# Patient Record
Sex: MALE | Race: WHITE | Employment: FULL TIME | URBAN - METROPOLITAN AREA
[De-identification: names, ages, dates, MRNs, and addresses within clinical notes are randomized per-mention and may not be internally consistent; named-entity substitution may affect disease eponyms.]

---

## 2017-01-27 ENCOUNTER — TELEPHONE (OUTPATIENT)
Dept: FAMILY MEDICINE CLINIC | Age: 52
End: 2017-01-27

## 2017-01-27 DIAGNOSIS — R10.9 FLANK PAIN: ICD-10-CM

## 2017-01-27 DIAGNOSIS — M15.9 PRIMARY OSTEOARTHRITIS INVOLVING MULTIPLE JOINTS: ICD-10-CM

## 2017-01-27 DIAGNOSIS — S39.012A STRAIN OF LUMBAR PARASPINAL MUSCLE, INITIAL ENCOUNTER: ICD-10-CM

## 2017-01-27 RX ORDER — HYDROCODONE BITARTRATE AND IBUPROFEN 7.5; 2 MG/1; MG/1
1 TABLET, FILM COATED ORAL 3 TIMES DAILY PRN
Qty: 90 TABLET | Refills: 0 | Status: SHIPPED | OUTPATIENT
Start: 2017-01-27 | End: 2017-03-08 | Stop reason: SDUPTHER

## 2017-02-23 ENCOUNTER — OFFICE VISIT (OUTPATIENT)
Dept: FAMILY MEDICINE CLINIC | Age: 52
End: 2017-02-23

## 2017-02-23 VITALS
DIASTOLIC BLOOD PRESSURE: 60 MMHG | BODY MASS INDEX: 27.49 KG/M2 | HEART RATE: 104 BPM | TEMPERATURE: 98.3 F | HEIGHT: 75 IN | RESPIRATION RATE: 16 BRPM | SYSTOLIC BLOOD PRESSURE: 108 MMHG | WEIGHT: 221.1 LBS

## 2017-02-23 DIAGNOSIS — I10 ESSENTIAL HYPERTENSION: ICD-10-CM

## 2017-02-23 DIAGNOSIS — J06.9 VIRAL UPPER RESPIRATORY TRACT INFECTION: Primary | ICD-10-CM

## 2017-02-23 DIAGNOSIS — F41.9 ANXIETY: ICD-10-CM

## 2017-02-23 PROCEDURE — 99214 OFFICE O/P EST MOD 30 MIN: CPT | Performed by: FAMILY MEDICINE

## 2017-02-23 RX ORDER — OSELTAMIVIR PHOSPHATE 75 MG/1
75 CAPSULE ORAL 2 TIMES DAILY
Qty: 10 CAPSULE | Refills: 0 | Status: SHIPPED | OUTPATIENT
Start: 2017-02-23 | End: 2017-02-28

## 2017-02-23 RX ORDER — ALPRAZOLAM 1 MG/1
1 TABLET ORAL 3 TIMES DAILY PRN
Qty: 270 TABLET | Refills: 0 | Status: SHIPPED | OUTPATIENT
Start: 2017-02-23 | End: 2017-07-12 | Stop reason: SDUPTHER

## 2017-02-23 ASSESSMENT — ENCOUNTER SYMPTOMS
GASTROINTESTINAL NEGATIVE: 1
WHEEZING: 0
SORE THROAT: 1
RHINORRHEA: 1
COUGH: 1
SINUS PRESSURE: 1
CHEST TIGHTNESS: 0
SHORTNESS OF BREATH: 0

## 2017-03-08 DIAGNOSIS — M15.9 PRIMARY OSTEOARTHRITIS INVOLVING MULTIPLE JOINTS: ICD-10-CM

## 2017-03-08 DIAGNOSIS — S39.012A STRAIN OF LUMBAR PARASPINAL MUSCLE, INITIAL ENCOUNTER: ICD-10-CM

## 2017-03-08 DIAGNOSIS — R10.9 FLANK PAIN: ICD-10-CM

## 2017-03-08 RX ORDER — HYDROCODONE BITARTRATE AND IBUPROFEN 7.5; 2 MG/1; MG/1
1 TABLET, FILM COATED ORAL 3 TIMES DAILY PRN
Qty: 90 TABLET | Refills: 0 | Status: SHIPPED | OUTPATIENT
Start: 2017-03-08 | End: 2017-04-13 | Stop reason: SDUPTHER

## 2017-04-13 DIAGNOSIS — R10.9 FLANK PAIN: ICD-10-CM

## 2017-04-13 DIAGNOSIS — M15.9 PRIMARY OSTEOARTHRITIS INVOLVING MULTIPLE JOINTS: ICD-10-CM

## 2017-04-13 DIAGNOSIS — S39.012A STRAIN OF LUMBAR PARASPINAL MUSCLE, INITIAL ENCOUNTER: ICD-10-CM

## 2017-04-13 RX ORDER — HYDROCODONE BITARTRATE AND IBUPROFEN 7.5; 2 MG/1; MG/1
1 TABLET, FILM COATED ORAL 3 TIMES DAILY PRN
Qty: 90 TABLET | Refills: 0 | Status: SHIPPED | OUTPATIENT
Start: 2017-04-13 | End: 2017-05-16 | Stop reason: SDUPTHER

## 2017-05-03 DIAGNOSIS — M54.2 CHRONIC CERVICAL PAIN: ICD-10-CM

## 2017-05-03 DIAGNOSIS — M54.9 SPINE PAIN: ICD-10-CM

## 2017-05-03 DIAGNOSIS — G89.29 CHRONIC CERVICAL PAIN: ICD-10-CM

## 2017-05-03 DIAGNOSIS — M15.9 PRIMARY OSTEOARTHRITIS INVOLVING MULTIPLE JOINTS: ICD-10-CM

## 2017-05-03 RX ORDER — METAXALONE 800 MG/1
TABLET ORAL
Qty: 270 TABLET | Refills: 2 | Status: ON HOLD | OUTPATIENT
Start: 2017-05-03 | End: 2018-10-22 | Stop reason: DRUGHIGH

## 2017-05-16 ENCOUNTER — TELEPHONE (OUTPATIENT)
Dept: FAMILY MEDICINE CLINIC | Age: 52
End: 2017-05-16

## 2017-05-16 DIAGNOSIS — M15.9 PRIMARY OSTEOARTHRITIS INVOLVING MULTIPLE JOINTS: ICD-10-CM

## 2017-05-16 DIAGNOSIS — R10.9 FLANK PAIN: ICD-10-CM

## 2017-05-16 DIAGNOSIS — S39.012A STRAIN OF LUMBAR PARASPINAL MUSCLE, INITIAL ENCOUNTER: ICD-10-CM

## 2017-05-16 RX ORDER — HYDROCODONE BITARTRATE AND IBUPROFEN 7.5; 2 MG/1; MG/1
1 TABLET, FILM COATED ORAL 3 TIMES DAILY PRN
Qty: 90 TABLET | Refills: 0 | Status: SHIPPED | OUTPATIENT
Start: 2017-05-16 | End: 2017-06-14 | Stop reason: SDUPTHER

## 2017-06-14 ENCOUNTER — TELEPHONE (OUTPATIENT)
Dept: FAMILY MEDICINE CLINIC | Age: 52
End: 2017-06-14

## 2017-06-14 DIAGNOSIS — S39.012A STRAIN OF LUMBAR PARASPINAL MUSCLE, INITIAL ENCOUNTER: ICD-10-CM

## 2017-06-14 DIAGNOSIS — R10.9 FLANK PAIN: ICD-10-CM

## 2017-06-14 DIAGNOSIS — M15.9 PRIMARY OSTEOARTHRITIS INVOLVING MULTIPLE JOINTS: ICD-10-CM

## 2017-06-14 RX ORDER — HYDROCODONE BITARTRATE AND IBUPROFEN 7.5; 2 MG/1; MG/1
1 TABLET, FILM COATED ORAL 3 TIMES DAILY PRN
Qty: 90 TABLET | Refills: 0 | Status: SHIPPED | OUTPATIENT
Start: 2017-06-14 | End: 2017-06-15 | Stop reason: SDUPTHER

## 2017-06-15 ENCOUNTER — TELEPHONE (OUTPATIENT)
Dept: FAMILY MEDICINE CLINIC | Age: 52
End: 2017-06-15

## 2017-06-15 DIAGNOSIS — M15.9 PRIMARY OSTEOARTHRITIS INVOLVING MULTIPLE JOINTS: ICD-10-CM

## 2017-06-15 DIAGNOSIS — R10.9 FLANK PAIN: ICD-10-CM

## 2017-06-15 DIAGNOSIS — S39.012A STRAIN OF LUMBAR PARASPINAL MUSCLE, INITIAL ENCOUNTER: ICD-10-CM

## 2017-06-15 RX ORDER — HYDROCODONE BITARTRATE AND IBUPROFEN 7.5; 2 MG/1; MG/1
1 TABLET, FILM COATED ORAL 3 TIMES DAILY PRN
Qty: 90 TABLET | Refills: 0 | Status: SHIPPED | OUTPATIENT
Start: 2017-06-15 | End: 2017-07-17 | Stop reason: SDUPTHER

## 2017-07-12 ENCOUNTER — TELEPHONE (OUTPATIENT)
Dept: FAMILY MEDICINE CLINIC | Age: 52
End: 2017-07-12

## 2017-07-12 DIAGNOSIS — F41.9 ANXIETY: ICD-10-CM

## 2017-07-12 RX ORDER — ALPRAZOLAM 1 MG/1
1 TABLET ORAL 3 TIMES DAILY PRN
Qty: 270 TABLET | Refills: 0 | Status: SHIPPED | OUTPATIENT
Start: 2017-07-12 | End: 2017-09-20 | Stop reason: SDUPTHER

## 2017-07-17 DIAGNOSIS — M15.9 PRIMARY OSTEOARTHRITIS INVOLVING MULTIPLE JOINTS: ICD-10-CM

## 2017-07-17 DIAGNOSIS — S39.012A STRAIN OF LUMBAR PARASPINAL MUSCLE, INITIAL ENCOUNTER: ICD-10-CM

## 2017-07-17 DIAGNOSIS — R10.9 FLANK PAIN: ICD-10-CM

## 2017-07-17 RX ORDER — HYDROCODONE BITARTRATE AND IBUPROFEN 7.5; 2 MG/1; MG/1
1 TABLET, FILM COATED ORAL 3 TIMES DAILY PRN
Qty: 90 TABLET | Refills: 0 | Status: SHIPPED | OUTPATIENT
Start: 2017-07-17 | End: 2017-08-16 | Stop reason: SDUPTHER

## 2017-08-16 DIAGNOSIS — M15.9 PRIMARY OSTEOARTHRITIS INVOLVING MULTIPLE JOINTS: ICD-10-CM

## 2017-08-16 DIAGNOSIS — S39.012A STRAIN OF LUMBAR PARASPINAL MUSCLE, INITIAL ENCOUNTER: ICD-10-CM

## 2017-08-16 DIAGNOSIS — R10.9 FLANK PAIN: ICD-10-CM

## 2017-08-16 RX ORDER — HYDROCODONE BITARTRATE AND IBUPROFEN 7.5; 2 MG/1; MG/1
1 TABLET, FILM COATED ORAL 3 TIMES DAILY PRN
Qty: 90 TABLET | Refills: 0 | Status: SHIPPED | OUTPATIENT
Start: 2017-08-16 | End: 2017-09-14 | Stop reason: SDUPTHER

## 2017-08-16 RX ORDER — HYDROCODONE BITARTRATE AND IBUPROFEN 7.5; 2 MG/1; MG/1
1 TABLET, FILM COATED ORAL 3 TIMES DAILY PRN
Qty: 90 TABLET | Refills: 0 | Status: SHIPPED | OUTPATIENT
Start: 2017-08-16 | End: 2017-08-16 | Stop reason: SDUPTHER

## 2017-08-21 ENCOUNTER — OFFICE VISIT (OUTPATIENT)
Dept: FAMILY MEDICINE CLINIC | Age: 52
End: 2017-08-21
Payer: COMMERCIAL

## 2017-08-21 VITALS
HEIGHT: 76 IN | BODY MASS INDEX: 26.81 KG/M2 | DIASTOLIC BLOOD PRESSURE: 76 MMHG | SYSTOLIC BLOOD PRESSURE: 118 MMHG | RESPIRATION RATE: 20 BRPM | WEIGHT: 220.2 LBS | HEART RATE: 96 BPM

## 2017-08-21 DIAGNOSIS — Z51.81 MEDICATION MONITORING ENCOUNTER: ICD-10-CM

## 2017-08-21 DIAGNOSIS — F41.9 CHRONIC ANXIETY: ICD-10-CM

## 2017-08-21 DIAGNOSIS — Z11.4 SCREENING FOR HIV WITHOUT PRESENCE OF RISK FACTORS: ICD-10-CM

## 2017-08-21 DIAGNOSIS — Z98.890 HX OF LUMBAR DISCECTOMY: ICD-10-CM

## 2017-08-21 DIAGNOSIS — E78.00 HYPERCHOLESTEREMIA: ICD-10-CM

## 2017-08-21 DIAGNOSIS — M17.11 PRIMARY OSTEOARTHRITIS OF RIGHT KNEE: ICD-10-CM

## 2017-08-21 DIAGNOSIS — Z11.59 NEED FOR HEPATITIS C SCREENING TEST: ICD-10-CM

## 2017-08-21 DIAGNOSIS — K21.9 GASTROESOPHAGEAL REFLUX DISEASE WITHOUT ESOPHAGITIS: ICD-10-CM

## 2017-08-21 DIAGNOSIS — I10 ESSENTIAL HYPERTENSION: Primary | ICD-10-CM

## 2017-08-21 DIAGNOSIS — M54.9 SPINE PAIN: ICD-10-CM

## 2017-08-21 PROCEDURE — 99214 OFFICE O/P EST MOD 30 MIN: CPT | Performed by: FAMILY MEDICINE

## 2017-08-21 ASSESSMENT — ENCOUNTER SYMPTOMS
COUGH: 0
SHORTNESS OF BREATH: 0
ALLERGIC/IMMUNOLOGIC NEGATIVE: 1
BACK PAIN: 1
RESPIRATORY NEGATIVE: 1
GASTROINTESTINAL NEGATIVE: 1

## 2017-08-21 ASSESSMENT — PATIENT HEALTH QUESTIONNAIRE - PHQ9
SUM OF ALL RESPONSES TO PHQ9 QUESTIONS 1 & 2: 0
2. FEELING DOWN, DEPRESSED OR HOPELESS: 0
SUM OF ALL RESPONSES TO PHQ QUESTIONS 1-9: 0
1. LITTLE INTEREST OR PLEASURE IN DOING THINGS: 0

## 2017-09-14 DIAGNOSIS — M15.9 PRIMARY OSTEOARTHRITIS INVOLVING MULTIPLE JOINTS: ICD-10-CM

## 2017-09-14 DIAGNOSIS — S39.012A STRAIN OF LUMBAR PARASPINAL MUSCLE, INITIAL ENCOUNTER: ICD-10-CM

## 2017-09-14 DIAGNOSIS — R10.9 FLANK PAIN: ICD-10-CM

## 2017-09-14 RX ORDER — HYDROCODONE BITARTRATE AND IBUPROFEN 7.5; 2 MG/1; MG/1
1 TABLET, FILM COATED ORAL 3 TIMES DAILY PRN
Qty: 90 TABLET | Refills: 0 | Status: SHIPPED | OUTPATIENT
Start: 2017-09-14 | End: 2017-10-12 | Stop reason: SDUPTHER

## 2017-09-20 DIAGNOSIS — F41.9 ANXIETY: ICD-10-CM

## 2017-09-20 RX ORDER — ALPRAZOLAM 1 MG/1
TABLET ORAL
Qty: 270 TABLET | Refills: 1 | Status: SHIPPED | OUTPATIENT
Start: 2017-09-20 | End: 2019-04-08 | Stop reason: SDUPTHER

## 2017-10-12 DIAGNOSIS — S39.012A STRAIN OF LUMBAR PARASPINAL MUSCLE, INITIAL ENCOUNTER: ICD-10-CM

## 2017-10-12 DIAGNOSIS — R10.9 FLANK PAIN: ICD-10-CM

## 2017-10-12 DIAGNOSIS — M15.9 PRIMARY OSTEOARTHRITIS INVOLVING MULTIPLE JOINTS: ICD-10-CM

## 2017-10-12 RX ORDER — HYDROCODONE BITARTRATE AND IBUPROFEN 7.5; 2 MG/1; MG/1
1 TABLET, FILM COATED ORAL 3 TIMES DAILY PRN
Qty: 90 TABLET | Refills: 0 | Status: SHIPPED | OUTPATIENT
Start: 2017-10-12 | End: 2017-11-13 | Stop reason: SDUPTHER

## 2017-10-12 NOTE — TELEPHONE ENCOUNTER
From: Divina Tanner. Sent: 10/12/2017 3:17 PM EDT  Subject: Medication Renewal Request    Mendel Berumen.  Tyrone Barber. would like a refill of the following medications:  HYDROcodone-ibuprofen (Roena Yolanda) 7.5-200 MG per tablet Houston Rivera MD]    Preferred pharmacy: [facility] - [place], [address] [facility] [phone]    Comment:

## 2017-11-13 DIAGNOSIS — R10.9 FLANK PAIN: ICD-10-CM

## 2017-11-13 DIAGNOSIS — M15.9 PRIMARY OSTEOARTHRITIS INVOLVING MULTIPLE JOINTS: ICD-10-CM

## 2017-11-13 DIAGNOSIS — S39.012A STRAIN OF LUMBAR PARASPINAL MUSCLE, INITIAL ENCOUNTER: ICD-10-CM

## 2017-11-13 RX ORDER — HYDROCODONE BITARTRATE AND IBUPROFEN 7.5; 2 MG/1; MG/1
1 TABLET, FILM COATED ORAL 3 TIMES DAILY PRN
Qty: 90 TABLET | Refills: 0 | Status: SHIPPED | OUTPATIENT
Start: 2017-11-13 | End: 2017-12-11 | Stop reason: SDUPTHER

## 2017-11-13 NOTE — TELEPHONE ENCOUNTER
From: Sujatha Willoughby. Sent: 11/13/2017 1:37 PM EST  Subject: Medication Renewal Request    Yahir Graves.  Roel Abernathy. would like a refill of the following medications:  HYDROcodone-ibuprofen (Inell Garter) 7.5-200 MG per tablet Mustapha Baron MD]    Preferred pharmacy: [facility] - [place], [address] [facility] [phone]    Comment:

## 2017-12-11 DIAGNOSIS — M15.9 PRIMARY OSTEOARTHRITIS INVOLVING MULTIPLE JOINTS: ICD-10-CM

## 2017-12-11 DIAGNOSIS — S39.012A STRAIN OF LUMBAR PARASPINAL MUSCLE, INITIAL ENCOUNTER: ICD-10-CM

## 2017-12-11 DIAGNOSIS — R10.9 FLANK PAIN: ICD-10-CM

## 2017-12-11 RX ORDER — HYDROCODONE BITARTRATE AND IBUPROFEN 7.5; 2 MG/1; MG/1
1 TABLET, FILM COATED ORAL 3 TIMES DAILY PRN
Qty: 90 TABLET | Refills: 0 | Status: SHIPPED | OUTPATIENT
Start: 2017-12-11 | End: 2018-01-11 | Stop reason: SDUPTHER

## 2017-12-27 ENCOUNTER — PATIENT MESSAGE (OUTPATIENT)
Dept: FAMILY MEDICINE CLINIC | Age: 52
End: 2017-12-27

## 2017-12-28 NOTE — TELEPHONE ENCOUNTER
From: Chantel Gupta. To: Yoel Morrow MD  Sent: 12/27/2017 4:36 PM EST  Subject: Prescription Question    I do not see the medicine I need in the list above. The bottle I have says hydroxtzine HCI 25 mg. tablet 3 X daily as needed for itching.

## 2018-01-11 DIAGNOSIS — S39.012A STRAIN OF LUMBAR PARASPINAL MUSCLE, INITIAL ENCOUNTER: ICD-10-CM

## 2018-01-11 DIAGNOSIS — M15.9 PRIMARY OSTEOARTHRITIS INVOLVING MULTIPLE JOINTS: ICD-10-CM

## 2018-01-11 DIAGNOSIS — R10.9 FLANK PAIN: ICD-10-CM

## 2018-01-11 RX ORDER — HYDROCODONE BITARTRATE AND IBUPROFEN 7.5; 2 MG/1; MG/1
1 TABLET, FILM COATED ORAL 3 TIMES DAILY PRN
Qty: 90 TABLET | Refills: 0 | Status: SHIPPED | OUTPATIENT
Start: 2018-01-11 | End: 2018-01-12 | Stop reason: SDUPTHER

## 2018-01-11 NOTE — TELEPHONE ENCOUNTER
From: Guille Yun. Sent: 1/11/2018 1:45 PM EST  Subject: Medication Renewal Request    Melanie Norman.  Jack Rowan. would like a refill of the following medications:  HYDROcodone-ibuprofen (Luigi Sinks) 7.5-200 MG per tablet David Daniels MD]    Preferred pharmacy: [facility] - [place], [address] [facility] [phone]    Comment:

## 2018-01-12 ENCOUNTER — TELEPHONE (OUTPATIENT)
Dept: FAMILY MEDICINE CLINIC | Age: 53
End: 2018-01-12

## 2018-01-12 DIAGNOSIS — M15.9 PRIMARY OSTEOARTHRITIS INVOLVING MULTIPLE JOINTS: ICD-10-CM

## 2018-01-12 DIAGNOSIS — S39.012A STRAIN OF LUMBAR PARASPINAL MUSCLE, INITIAL ENCOUNTER: ICD-10-CM

## 2018-01-12 DIAGNOSIS — R10.9 FLANK PAIN: ICD-10-CM

## 2018-01-12 RX ORDER — HYDROCODONE BITARTRATE AND IBUPROFEN 7.5; 2 MG/1; MG/1
1 TABLET, FILM COATED ORAL 3 TIMES DAILY PRN
Qty: 90 TABLET | Refills: 0 | Status: SHIPPED | OUTPATIENT
Start: 2018-01-12 | End: 2018-02-19 | Stop reason: SDUPTHER

## 2018-01-15 NOTE — TELEPHONE ENCOUNTER
1/15/18 Noelle is asking the status on this PA, pls call her on her cell [phone], thanks/angelica

## 2018-01-15 NOTE — TELEPHONE ENCOUNTER
He can try and contact his orthopedist and see if they can push through a prior authorization.   If not, he can check into what self-pay will cost.

## 2018-02-19 ENCOUNTER — OFFICE VISIT (OUTPATIENT)
Dept: FAMILY MEDICINE CLINIC | Age: 53
End: 2018-02-19
Payer: COMMERCIAL

## 2018-02-19 VITALS
BODY MASS INDEX: 26.99 KG/M2 | WEIGHT: 221.6 LBS | HEIGHT: 76 IN | HEART RATE: 96 BPM | SYSTOLIC BLOOD PRESSURE: 120 MMHG | DIASTOLIC BLOOD PRESSURE: 78 MMHG | TEMPERATURE: 98.4 F | RESPIRATION RATE: 12 BRPM

## 2018-02-19 DIAGNOSIS — M15.9 PRIMARY OSTEOARTHRITIS INVOLVING MULTIPLE JOINTS: ICD-10-CM

## 2018-02-19 DIAGNOSIS — M51.16 LUMBAR DISC DISEASE WITH RADICULOPATHY: ICD-10-CM

## 2018-02-19 DIAGNOSIS — I10 ESSENTIAL HYPERTENSION: Primary | ICD-10-CM

## 2018-02-19 DIAGNOSIS — S39.012A STRAIN OF LUMBAR PARASPINAL MUSCLE, INITIAL ENCOUNTER: ICD-10-CM

## 2018-02-19 DIAGNOSIS — E78.00 HYPERCHOLESTEREMIA: ICD-10-CM

## 2018-02-19 DIAGNOSIS — Z98.890 HX OF LUMBAR DISCECTOMY: ICD-10-CM

## 2018-02-19 DIAGNOSIS — R10.9 FLANK PAIN: ICD-10-CM

## 2018-02-19 DIAGNOSIS — K21.9 GASTROESOPHAGEAL REFLUX DISEASE WITHOUT ESOPHAGITIS: ICD-10-CM

## 2018-02-19 DIAGNOSIS — F41.9 CHRONIC ANXIETY: ICD-10-CM

## 2018-02-19 DIAGNOSIS — M17.11 PRIMARY OSTEOARTHRITIS OF RIGHT KNEE: ICD-10-CM

## 2018-02-19 PROCEDURE — 99214 OFFICE O/P EST MOD 30 MIN: CPT | Performed by: FAMILY MEDICINE

## 2018-02-19 RX ORDER — HYDROCODONE BITARTRATE AND IBUPROFEN 7.5; 2 MG/1; MG/1
1 TABLET, FILM COATED ORAL 3 TIMES DAILY PRN
Qty: 90 TABLET | Refills: 0 | Status: SHIPPED | OUTPATIENT
Start: 2018-02-19 | End: 2018-03-20 | Stop reason: SDUPTHER

## 2018-02-19 ASSESSMENT — ENCOUNTER SYMPTOMS
ALLERGIC/IMMUNOLOGIC NEGATIVE: 1
COUGH: 0
RESPIRATORY NEGATIVE: 1
GASTROINTESTINAL NEGATIVE: 1
BACK PAIN: 1

## 2018-02-19 NOTE — PROGRESS NOTES
will be reassessed this May, 2018, when both he and his wife will participate with the [employer] [facility] lab draw at the [facility]. Cholesterol, Total (no units)   Date Value   05/03/2014 177     Cholesterol (mg/dL)   Date Value   05/06/2017 153     HDL   Date Value   05/06/2017 41 mg/dL   05/05/2012 40 mg/dl     Triglycerides (mg/dL)   Date Value   05/06/2017 189 (H)     The rest of this patient's conditions are stable. Past medical and surgical hx reviewed. History reviewed. No pertinent past medical history. Past Surgical History:   Procedure Laterality Date    BACK SURGERY  11/18/2016    L-5 and bulging disc repair 2 rods and 4 screws with fusion at [facility] with Dr. Bryan Bruce     Portions of this note were completed with a voice recording program.  Efforts were made to edit the dictations but occasionally words are mis-transcribed. Review of Systems   Constitutional: Negative. HENT: Negative. Respiratory: Negative. Negative for cough. Cardiovascular: Negative. Negative for chest pain, palpitations and leg swelling. Gastrointestinal: Negative. Endocrine: Negative. Genitourinary: Negative. Negative for difficulty urinating. Musculoskeletal: Positive for arthralgias and back pain. Skin: Negative. Allergic/Immunologic: Negative. Neurological: Negative. Hematological: Negative. Psychiatric/Behavioral: The patient is nervous/anxious. All other systems reviewed and are negative. Objective:   Physical Exam   Constitutional: He is oriented to person, place, and time. He appears well-developed and well-nourished. HENT:   Right Ear: External ear normal.   Left Ear: External ear normal.   Nose: Nose normal.   Mouth/Throat: Oropharynx is clear and moist.   Eyes: Conjunctivae are normal.   Neck: No thyromegaly present. Cardiovascular: Normal rate, regular rhythm and normal heart sounds. Exam reveals no gallop.     No murmur

## 2018-03-09 DIAGNOSIS — E78.00 HYPERCHOLESTEREMIA: ICD-10-CM

## 2018-03-09 DIAGNOSIS — I10 ESSENTIAL HYPERTENSION: ICD-10-CM

## 2018-03-12 RX ORDER — ATORVASTATIN CALCIUM 40 MG/1
TABLET, FILM COATED ORAL
Qty: 90 TABLET | Refills: 3 | Status: SHIPPED | OUTPATIENT
Start: 2018-03-12 | End: 2018-04-23 | Stop reason: SDUPTHER

## 2018-03-12 RX ORDER — LISINOPRIL 20 MG/1
TABLET ORAL
Qty: 90 TABLET | Refills: 3 | Status: SHIPPED | OUTPATIENT
Start: 2018-03-12 | End: 2018-04-23 | Stop reason: SDUPTHER

## 2018-03-20 DIAGNOSIS — M15.9 PRIMARY OSTEOARTHRITIS INVOLVING MULTIPLE JOINTS: ICD-10-CM

## 2018-03-20 DIAGNOSIS — R10.9 FLANK PAIN: ICD-10-CM

## 2018-03-20 DIAGNOSIS — S39.012A STRAIN OF LUMBAR PARASPINAL MUSCLE, INITIAL ENCOUNTER: ICD-10-CM

## 2018-03-20 RX ORDER — HYDROCODONE BITARTRATE AND IBUPROFEN 7.5; 2 MG/1; MG/1
1 TABLET, FILM COATED ORAL 3 TIMES DAILY PRN
Qty: 90 TABLET | Refills: 0 | Status: SHIPPED | OUTPATIENT
Start: 2018-03-20 | End: 2018-04-20 | Stop reason: SDUPTHER

## 2018-04-20 DIAGNOSIS — S39.012A STRAIN OF LUMBAR PARASPINAL MUSCLE, INITIAL ENCOUNTER: ICD-10-CM

## 2018-04-20 DIAGNOSIS — M15.9 PRIMARY OSTEOARTHRITIS INVOLVING MULTIPLE JOINTS: ICD-10-CM

## 2018-04-20 DIAGNOSIS — R10.9 FLANK PAIN: ICD-10-CM

## 2018-04-20 RX ORDER — HYDROCODONE BITARTRATE AND IBUPROFEN 7.5; 2 MG/1; MG/1
1 TABLET, FILM COATED ORAL 3 TIMES DAILY PRN
Qty: 90 TABLET | Refills: 0 | Status: SHIPPED | OUTPATIENT
Start: 2018-04-20 | End: 2018-05-21 | Stop reason: SDUPTHER

## 2018-04-23 DIAGNOSIS — I10 ESSENTIAL HYPERTENSION: ICD-10-CM

## 2018-04-23 DIAGNOSIS — E78.00 HYPERCHOLESTEREMIA: ICD-10-CM

## 2018-04-23 RX ORDER — LISINOPRIL 20 MG/1
20 TABLET ORAL DAILY
Qty: 90 TABLET | Refills: 3 | Status: SHIPPED | OUTPATIENT
Start: 2018-04-23 | End: 2019-04-17 | Stop reason: SDUPTHER

## 2018-04-23 RX ORDER — ATORVASTATIN CALCIUM 40 MG/1
40 TABLET, FILM COATED ORAL NIGHTLY
Qty: 90 TABLET | Refills: 3 | Status: SHIPPED | OUTPATIENT
Start: 2018-04-23 | End: 2019-04-17 | Stop reason: SDUPTHER

## 2018-06-19 DIAGNOSIS — R10.9 FLANK PAIN: ICD-10-CM

## 2018-06-19 DIAGNOSIS — M15.9 PRIMARY OSTEOARTHRITIS INVOLVING MULTIPLE JOINTS: ICD-10-CM

## 2018-06-19 DIAGNOSIS — S39.012A STRAIN OF LUMBAR PARASPINAL MUSCLE, INITIAL ENCOUNTER: ICD-10-CM

## 2018-06-19 RX ORDER — HYDROCODONE BITARTRATE AND IBUPROFEN 7.5; 2 MG/1; MG/1
1 TABLET, FILM COATED ORAL 3 TIMES DAILY PRN
Qty: 90 TABLET | Refills: 0 | Status: SHIPPED | OUTPATIENT
Start: 2018-06-19 | End: 2018-06-19 | Stop reason: SDUPTHER

## 2018-06-19 RX ORDER — HYDROCODONE BITARTRATE AND IBUPROFEN 7.5; 2 MG/1; MG/1
1 TABLET, FILM COATED ORAL 3 TIMES DAILY PRN
Qty: 90 TABLET | Refills: 0 | Status: SHIPPED | OUTPATIENT
Start: 2018-06-19 | End: 2018-07-24 | Stop reason: SDUPTHER

## 2018-08-20 ENCOUNTER — OFFICE VISIT (OUTPATIENT)
Dept: FAMILY MEDICINE CLINIC | Age: 53
End: 2018-08-20
Payer: COMMERCIAL

## 2018-08-20 VITALS
BODY MASS INDEX: 27.16 KG/M2 | RESPIRATION RATE: 16 BRPM | SYSTOLIC BLOOD PRESSURE: 118 MMHG | DIASTOLIC BLOOD PRESSURE: 72 MMHG | HEART RATE: 84 BPM | HEIGHT: 76 IN | WEIGHT: 223 LBS

## 2018-08-20 DIAGNOSIS — K21.9 GASTROESOPHAGEAL REFLUX DISEASE WITHOUT ESOPHAGITIS: ICD-10-CM

## 2018-08-20 DIAGNOSIS — S39.012A STRAIN OF LUMBAR PARASPINAL MUSCLE, INITIAL ENCOUNTER: ICD-10-CM

## 2018-08-20 DIAGNOSIS — I10 ESSENTIAL HYPERTENSION: ICD-10-CM

## 2018-08-20 DIAGNOSIS — M17.11 PRIMARY OSTEOARTHRITIS OF RIGHT KNEE: Primary | ICD-10-CM

## 2018-08-20 DIAGNOSIS — M51.16 LUMBAR DISC DISEASE WITH RADICULOPATHY: ICD-10-CM

## 2018-08-20 DIAGNOSIS — M25.551 PAIN OF RIGHT HIP JOINT: ICD-10-CM

## 2018-08-20 DIAGNOSIS — R10.9 FLANK PAIN: ICD-10-CM

## 2018-08-20 DIAGNOSIS — M15.9 PRIMARY OSTEOARTHRITIS INVOLVING MULTIPLE JOINTS: ICD-10-CM

## 2018-08-20 DIAGNOSIS — E78.00 HYPERCHOLESTEREMIA: ICD-10-CM

## 2018-08-20 DIAGNOSIS — Z51.81 MEDICATION MONITORING ENCOUNTER: ICD-10-CM

## 2018-08-20 PROCEDURE — 99214 OFFICE O/P EST MOD 30 MIN: CPT | Performed by: FAMILY MEDICINE

## 2018-08-20 RX ORDER — HYDROCODONE BITARTRATE AND IBUPROFEN 7.5; 2 MG/1; MG/1
1 TABLET, FILM COATED ORAL 3 TIMES DAILY PRN
Qty: 90 TABLET | Refills: 0 | Status: SHIPPED | OUTPATIENT
Start: 2018-08-20 | End: 2018-09-21 | Stop reason: SDUPTHER

## 2018-08-20 ASSESSMENT — ENCOUNTER SYMPTOMS
RESPIRATORY NEGATIVE: 1
ALLERGIC/IMMUNOLOGIC NEGATIVE: 1
GASTROINTESTINAL NEGATIVE: 1
BACK PAIN: 1

## 2018-08-20 NOTE — PATIENT INSTRUCTIONS
You may receive a survey about your visit with us today. The feedback from our patients helps us identify what is working well and where the service to all patients can be enhanced. Thank you! Patient Education        Continue present medications. Walk as tolerated for exercise. High Blood Pressure: Care Instructions  Your Care Instructions    If your blood pressure is usually above 130/80, you have high blood pressure, or hypertension. That means the top number is 130 or higher or the bottom number is 80 or higher, or both. Despite what a lot of people think, high blood pressure usually doesn't cause headaches or make you feel dizzy or lightheaded. It usually has no symptoms. But it does increase your risk for heart attack, stroke, and kidney or eye damage. The higher your blood pressure, the more your risk increases. Your doctor will give you a goal for your blood pressure. Your goal will be based on your health and your age. Lifestyle changes, such as eating healthy and being active, are always important to help lower blood pressure. You might also take medicine to reach your blood pressure goal.  Follow-up care is a key part of your treatment and safety. Be sure to make and go to all appointments, and call your doctor if you are having problems. It's also a good idea to know your test results and keep a list of the medicines you take. How can you care for yourself at home? Medical treatment  · If you stop taking your medicine, your blood pressure will go back up. You may take one or more types of medicine to lower your blood pressure. Be safe with medicines. Take your medicine exactly as prescribed. Call your doctor if you think you are having a problem with your medicine. · Talk to your doctor before you start taking aspirin every day. Aspirin can help certain people lower their risk of a heart attack or stroke.  But taking aspirin isn't right for everyone, because it can cause serious

## 2018-08-20 NOTE — PROGRESS NOTES
for right knee replacement. At this point he is interested in pursuing anything in the right hip. His gastroesophageal reflux and gastritis symptoms continue to be well-controlled with his current dose of Prilosec 20 mg which she takes daily. The rest of this patient's conditions are stable. Past medical and surgical hx reviewed. No past medical history on file. Past Surgical History:   Procedure Laterality Date    BACK SURGERY  11/18/2016    L-5 and bulging disc repair 2 rods and 4 screws with fusion at [facility] with Dr. Medardo Bhagat     Portions of this note were completed with a voice recording program.  Efforts were made to edit the dictations but occasionally words are mis-transcribed. Review of Systems   Constitutional: Negative. HENT: Negative. Respiratory: Negative. Cardiovascular: Negative. Negative for chest pain, palpitations and leg swelling. Gastrointestinal: Negative. Endocrine: Negative. Genitourinary: Negative. Musculoskeletal: Positive for arthralgias and back pain. Skin: Negative. Allergic/Immunologic: Negative. Neurological: Negative. Hematological: Negative. Psychiatric/Behavioral: Negative. All other systems reviewed and are negative. Objective:   Physical Exam   Constitutional: He is oriented to person, place, and time. He appears well-developed and well-nourished. HENT:   Right Ear: External ear normal.   Left Ear: External ear normal.   Nose: Nose normal.   Mouth/Throat: Oropharynx is clear and moist.   Eyes: Conjunctivae are normal.   Neck: No thyromegaly present. Cardiovascular: Normal rate, regular rhythm and normal heart sounds. Exam reveals no gallop. No murmur heard. Pulmonary/Chest: Effort normal. He has no wheezes. He has no rales. Abdominal: Soft. Bowel sounds are normal.   Musculoskeletal: He exhibits no edema. Right knee: He exhibits decreased range of motion and erythema. He exhibits no swelling. Tenderness found. Medial joint line and lateral joint line tenderness noted. Lumbar back: He exhibits decreased range of motion and tenderness. He exhibits no pain and no spasm. Lymphadenopathy:     He has no cervical adenopathy. Neurological: He is oriented to person, place, and time. Skin: Skin is warm and dry. No rash noted. Psychiatric: He has a normal mood and affect. Nursing note and vitals reviewed. Assessment:       Diagnosis Orders   1. Primary osteoarthritis of right knee, bone on bone     2. Flank pain  HYDROcodone-ibuprofen (VICOPROFEN) 7.5-200 MG per tablet   3. Primary osteoarthritis involving multiple joints  HYDROcodone-ibuprofen (VICOPROFEN) 7.5-200 MG per tablet   4. Strain of lumbar paraspinal muscle, initial encounter  HYDROcodone-ibuprofen (VICOPROFEN) 7.5-200 MG per tablet   5. Lumbar disc disease with radiculopathy, L5-S1 with sciatica     6. Essential hypertension     7. Hypercholesteremia     8. Gastroesophageal reflux disease without esophagitis     9. Pain of right hip joint     10. Medication monitoring encounter             Plan:      No orders of the defined types were placed in this encounter. Medications Discontinued During This Encounter   Medication Reason    HYDROcodone-ibuprofen (VICOPROFEN) 7.5-200 MG per tablet REORDER     Current Outpatient Prescriptions   Medication Sig Dispense Refill    HYDROcodone-ibuprofen (VICOPROFEN) 7.5-200 MG per tablet Take 1 tablet by mouth 3 times daily as needed for Pain for up to 30 days. . 90 tablet 0    lisinopril (PRINIVIL;ZESTRIL) 20 MG tablet Take 1 tablet by mouth daily 90 tablet 3    atorvastatin (LIPITOR) 40 MG tablet Take 1 tablet by mouth nightly 90 tablet 3    hydrOXYzine (ATARAX) 25 MG tablet Take 1 tablet by mouth 3 times daily as needed for Itching 120 tablet 2    ALPRAZolam (XANAX) 1 MG tablet Take 1 tablet by mouth 3  times daily as needed for  anxiety 270 tablet 1    metaxalone (SKELAXIN) 800 MG

## 2018-08-20 NOTE — PROGRESS NOTES
Chronic Disease Visit Information    BP Readings from Last 3 Encounters:   08/20/18 118/72   02/19/18 120/78   08/21/17 118/76          Hemoglobin A1C (%)   Date Value   05/05/2018 5.8     LDL Calculated (mg/dL)   Date Value   05/05/2018 50     HDL   Date Value   05/05/2018 41 mg/dL   05/05/2012 40 mg/dl     BUN (mg/dL)   Date Value   05/05/2018 11     CREATININE (mg/dL)   Date Value   05/05/2018 0.68 (L)     Glucose (mg/dL)   Date Value   05/05/2018 107            Have you changed or started any medications since your last visit including any over-the-counter medicines, vitamins, or herbal medicines? no   Are you having any side effects from any of your medications? -  no  Have you stopped taking any of your medications? Is so, why? -  no    Have you seen any other physician or provider since your last visit? No  Have you had any other diagnostic tests since your last visit? No  Have you been seen in the emergency room and/or had an admission to a hospital since we last saw you? No  Have you had your annual diabetic retinal (eye) exam? No  Have you had your routine dental cleaning in the past 6 months? yes - 2/2018    Have you activated your [employer] account? If not, what are your barriers?  Yes     Patient Care Team:  Reshma Median MD as PCP - General (Family Medicine)         Medical History Review  Past Medical, Family, and Social History reviewed and does contribute to the patient presenting condition    Health Maintenance   Topic Date Due    Hepatitis C screen  1965    HIV screen  12/30/1978    DTaP/Tdap/Td vaccine (1 - Tdap) 12/30/1982    Shingles Vaccine (1 of 2 - 2 Dose Series) 12/30/2013    Flu vaccine (1) 09/01/2018    A1C test (Diabetic or Prediabetic)  05/05/2019    Potassium monitoring  05/05/2019    Creatinine monitoring  05/05/2019    Lipid screen  05/05/2023    Colon cancer screen colonoscopy  10/13/2027

## 2018-09-21 DIAGNOSIS — M15.9 PRIMARY OSTEOARTHRITIS INVOLVING MULTIPLE JOINTS: ICD-10-CM

## 2018-09-21 DIAGNOSIS — R10.9 FLANK PAIN: ICD-10-CM

## 2018-09-21 DIAGNOSIS — S39.012A STRAIN OF LUMBAR PARASPINAL MUSCLE, INITIAL ENCOUNTER: ICD-10-CM

## 2018-09-21 RX ORDER — HYDROCODONE BITARTRATE AND IBUPROFEN 7.5; 2 MG/1; MG/1
1 TABLET, FILM COATED ORAL 3 TIMES DAILY PRN
Qty: 90 TABLET | Refills: 0 | Status: ON HOLD | OUTPATIENT
Start: 2018-09-21 | End: 2018-10-25 | Stop reason: HOSPADM

## 2018-10-01 ENCOUNTER — TELEPHONE (OUTPATIENT)
Dept: FAMILY MEDICINE CLINIC | Age: 53
End: 2018-10-01

## 2018-10-01 DIAGNOSIS — K43.9 VENTRAL HERNIA WITHOUT OBSTRUCTION OR GANGRENE: ICD-10-CM

## 2018-10-02 PROBLEM — K43.9 VENTRAL HERNIA WITHOUT OBSTRUCTION OR GANGRENE: Status: ACTIVE | Noted: 2018-10-02

## 2018-10-04 ENCOUNTER — OFFICE VISIT (OUTPATIENT)
Dept: SURGERY | Age: 53
End: 2018-10-04
Payer: COMMERCIAL

## 2018-10-04 VITALS
SYSTOLIC BLOOD PRESSURE: 130 MMHG | HEART RATE: 96 BPM | DIASTOLIC BLOOD PRESSURE: 80 MMHG | WEIGHT: 223.7 LBS | HEIGHT: 76 IN | TEMPERATURE: 98.9 F | RESPIRATION RATE: 18 BRPM | BODY MASS INDEX: 27.24 KG/M2 | OXYGEN SATURATION: 98 %

## 2018-10-04 DIAGNOSIS — M62.08 DIASTASIS OF RECTUS ABDOMINIS: Primary | ICD-10-CM

## 2018-10-04 PROCEDURE — 99202 OFFICE O/P NEW SF 15 MIN: CPT | Performed by: SURGERY

## 2018-10-05 ASSESSMENT — ENCOUNTER SYMPTOMS
BLOOD IN STOOL: 0
WHEEZING: 0
ABDOMINAL PAIN: 0
NAUSEA: 0
SHORTNESS OF BREATH: 0
COLOR CHANGE: 0
SORE THROAT: 0
VOMITING: 0
TROUBLE SWALLOWING: 0
COUGH: 0
VOICE CHANGE: 0

## 2018-10-21 ENCOUNTER — HOSPITAL ENCOUNTER (INPATIENT)
Age: 53
LOS: 4 days | Discharge: HOME | DRG: 393 | End: 2018-10-25
Attending: INTERNAL MEDICINE | Admitting: INTERNAL MEDICINE
Payer: COMMERCIAL

## 2018-10-21 DIAGNOSIS — K62.5 GASTROINTESTINAL HEMORRHAGE ASSOCIATED WITH ANORECTAL SOURCE: ICD-10-CM

## 2018-10-21 DIAGNOSIS — K64.8 INTERNAL HEMORRHOIDS: ICD-10-CM

## 2018-10-21 DIAGNOSIS — K92.2 LOWER GI BLEED: Primary | ICD-10-CM

## 2018-10-21 LAB
ABO: NORMAL
ALBUMIN SERPL-MCNC: 4.1 G/DL (ref 3.5–5.1)
ALP BLD-CCNC: 34 U/L (ref 38–126)
ALT SERPL-CCNC: 51 U/L (ref 11–66)
ANION GAP SERPL CALCULATED.3IONS-SCNC: 15 MEQ/L (ref 8–16)
ANTIBODY SCREEN: NORMAL
APTT: 25.3 SECONDS (ref 22–38)
AST SERPL-CCNC: 43 U/L (ref 5–40)
BASOPHILS # BLD: 0.3 %
BASOPHILS ABSOLUTE: 0 THOU/MM3 (ref 0–0.1)
BILIRUB SERPL-MCNC: 0.6 MG/DL (ref 0.3–1.2)
BUN BLDV-MCNC: 11 MG/DL (ref 7–22)
CALCIUM SERPL-MCNC: 8.9 MG/DL (ref 8.5–10.5)
CHLORIDE BLD-SCNC: 100 MEQ/L (ref 98–111)
CO2: 24 MEQ/L (ref 23–33)
CREAT SERPL-MCNC: 0.8 MG/DL (ref 0.4–1.2)
EKG ATRIAL RATE: 113 BPM
EKG P AXIS: 39 DEGREES
EKG P-R INTERVAL: 136 MS
EKG Q-T INTERVAL: 336 MS
EKG QRS DURATION: 96 MS
EKG QTC CALCULATION (BAZETT): 460 MS
EKG R AXIS: 28 DEGREES
EKG T AXIS: 38 DEGREES
EKG VENTRICULAR RATE: 113 BPM
EOSINOPHIL # BLD: 1.7 %
EOSINOPHILS ABSOLUTE: 0.1 THOU/MM3 (ref 0–0.4)
ERYTHROCYTE [DISTWIDTH] IN BLOOD BY AUTOMATED COUNT: 12.4 % (ref 11.5–14.5)
ERYTHROCYTE [DISTWIDTH] IN BLOOD BY AUTOMATED COUNT: 41.4 FL (ref 35–45)
GFR SERPL CREATININE-BSD FRML MDRD: > 90 ML/MIN/1.73M2
GLUCOSE BLD-MCNC: 160 MG/DL (ref 70–108)
HCT VFR BLD CALC: 26.9 % (ref 42–52)
HCT VFR BLD CALC: 33.4 % (ref 42–52)
HCT VFR BLD CALC: 38.9 % (ref 42–52)
HEMOGLOBIN: 11.2 GM/DL (ref 14–18)
HEMOGLOBIN: 13.3 GM/DL (ref 14–18)
HEMOGLOBIN: 9.2 GM/DL (ref 14–18)
IMMATURE GRANS (ABS): 0.02 THOU/MM3 (ref 0–0.07)
IMMATURE GRANULOCYTES: 0.3 %
INR BLD: 1 (ref 0.85–1.13)
LYMPHOCYTES # BLD: 22.2 %
LYMPHOCYTES ABSOLUTE: 1.6 THOU/MM3 (ref 1–4.8)
MCH RBC QN AUTO: 31.7 PG (ref 26–33)
MCHC RBC AUTO-ENTMCNC: 34.2 GM/DL (ref 32.2–35.5)
MCV RBC AUTO: 92.6 FL (ref 80–94)
MONOCYTES # BLD: 4.8 %
MONOCYTES ABSOLUTE: 0.4 THOU/MM3 (ref 0.4–1.3)
NUCLEATED RED BLOOD CELLS: 0 /100 WBC
OSMOLALITY CALCULATION: 280.4 MOSMOL/KG (ref 275–300)
PLATELET # BLD: 221 THOU/MM3 (ref 130–400)
PMV BLD AUTO: 9.4 FL (ref 9.4–12.4)
POTASSIUM SERPL-SCNC: 3.9 MEQ/L (ref 3.5–5.2)
RBC # BLD: 4.2 MILL/MM3 (ref 4.7–6.1)
RH FACTOR: NORMAL
SEG NEUTROPHILS: 70.7 %
SEGMENTED NEUTROPHILS ABSOLUTE COUNT: 5.2 THOU/MM3 (ref 1.8–7.7)
SODIUM BLD-SCNC: 139 MEQ/L (ref 135–145)
TOTAL PROTEIN: 6.6 G/DL (ref 6.1–8)
WBC # BLD: 7.4 THOU/MM3 (ref 4.8–10.8)

## 2018-10-21 PROCEDURE — 85018 HEMOGLOBIN: CPT

## 2018-10-21 PROCEDURE — P9016 RBC LEUKOCYTES REDUCED: HCPCS

## 2018-10-21 PROCEDURE — 3609017100 HC EGD: Performed by: INTERNAL MEDICINE

## 2018-10-21 PROCEDURE — 99152 MOD SED SAME PHYS/QHP 5/>YRS: CPT | Performed by: INTERNAL MEDICINE

## 2018-10-21 PROCEDURE — 2580000003 HC RX 258: Performed by: NURSE PRACTITIONER

## 2018-10-21 PROCEDURE — 86900 BLOOD TYPING SEROLOGIC ABO: CPT

## 2018-10-21 PROCEDURE — 85730 THROMBOPLASTIN TIME PARTIAL: CPT

## 2018-10-21 PROCEDURE — 99222 1ST HOSP IP/OBS MODERATE 55: CPT | Performed by: INTERNAL MEDICINE

## 2018-10-21 PROCEDURE — 93010 ELECTROCARDIOGRAM REPORT: CPT | Performed by: NUCLEAR MEDICINE

## 2018-10-21 PROCEDURE — 86923 COMPATIBILITY TEST ELECTRIC: CPT

## 2018-10-21 PROCEDURE — 36415 COLL VENOUS BLD VENIPUNCTURE: CPT

## 2018-10-21 PROCEDURE — 80053 COMPREHEN METABOLIC PANEL: CPT

## 2018-10-21 PROCEDURE — 85025 COMPLETE CBC W/AUTO DIFF WBC: CPT

## 2018-10-21 PROCEDURE — 2140000000 HC CCU INTERMEDIATE R&B

## 2018-10-21 PROCEDURE — 85014 HEMATOCRIT: CPT

## 2018-10-21 PROCEDURE — 86901 BLOOD TYPING SEROLOGIC RH(D): CPT

## 2018-10-21 PROCEDURE — 86850 RBC ANTIBODY SCREEN: CPT

## 2018-10-21 PROCEDURE — 2709999900 HC NON-CHARGEABLE SUPPLY: Performed by: INTERNAL MEDICINE

## 2018-10-21 PROCEDURE — 6360000002 HC RX W HCPCS: Performed by: INTERNAL MEDICINE

## 2018-10-21 PROCEDURE — 93005 ELECTROCARDIOGRAM TRACING: CPT | Performed by: NURSE PRACTITIONER

## 2018-10-21 PROCEDURE — 99285 EMERGENCY DEPT VISIT HI MDM: CPT

## 2018-10-21 PROCEDURE — 2700000000 HC OXYGEN THERAPY PER DAY

## 2018-10-21 PROCEDURE — 94760 N-INVAS EAR/PLS OXIMETRY 1: CPT

## 2018-10-21 PROCEDURE — 85610 PROTHROMBIN TIME: CPT

## 2018-10-21 PROCEDURE — 3609009900 HC COLONOSCOPY W/CONTROL BLEEDING ANY METHOD: Performed by: INTERNAL MEDICINE

## 2018-10-21 PROCEDURE — 99153 MOD SED SAME PHYS/QHP EA: CPT | Performed by: INTERNAL MEDICINE

## 2018-10-21 RX ORDER — SODIUM CHLORIDE 0.9 % (FLUSH) 0.9 %
10 SYRINGE (ML) INJECTION EVERY 12 HOURS SCHEDULED
Status: DISCONTINUED | OUTPATIENT
Start: 2018-10-21 | End: 2018-10-25 | Stop reason: HOSPADM

## 2018-10-21 RX ORDER — HYDROXYZINE HYDROCHLORIDE 25 MG/1
25 TABLET, FILM COATED ORAL 3 TIMES DAILY PRN
Status: DISCONTINUED | OUTPATIENT
Start: 2018-10-21 | End: 2018-10-25 | Stop reason: HOSPADM

## 2018-10-21 RX ORDER — 0.9 % SODIUM CHLORIDE 0.9 %
30 INTRAVENOUS SOLUTION INTRAVENOUS ONCE
Status: COMPLETED | OUTPATIENT
Start: 2018-10-21 | End: 2018-10-21

## 2018-10-21 RX ORDER — METAXALONE 800 MG/1
400 TABLET ORAL 3 TIMES DAILY
Status: DISCONTINUED | OUTPATIENT
Start: 2018-10-22 | End: 2018-10-25 | Stop reason: HOSPADM

## 2018-10-21 RX ORDER — 0.9 % SODIUM CHLORIDE 0.9 %
250 INTRAVENOUS SOLUTION INTRAVENOUS ONCE
Status: DISCONTINUED | OUTPATIENT
Start: 2018-10-21 | End: 2018-10-23

## 2018-10-21 RX ORDER — MIDAZOLAM HYDROCHLORIDE 1 MG/ML
INJECTION INTRAMUSCULAR; INTRAVENOUS PRN
Status: DISCONTINUED | OUTPATIENT
Start: 2018-10-21 | End: 2018-10-24 | Stop reason: HOSPADM

## 2018-10-21 RX ORDER — ATORVASTATIN CALCIUM 40 MG/1
40 TABLET, FILM COATED ORAL NIGHTLY
Status: DISCONTINUED | OUTPATIENT
Start: 2018-10-21 | End: 2018-10-25 | Stop reason: HOSPADM

## 2018-10-21 RX ORDER — HYDROCODONE BITARTRATE AND ACETAMINOPHEN 5; 325 MG/1; MG/1
1 TABLET ORAL EVERY 6 HOURS PRN
Status: DISCONTINUED | OUTPATIENT
Start: 2018-10-21 | End: 2018-10-25 | Stop reason: HOSPADM

## 2018-10-21 RX ORDER — ONDANSETRON 2 MG/ML
4 INJECTION INTRAMUSCULAR; INTRAVENOUS EVERY 4 HOURS PRN
Status: DISCONTINUED | OUTPATIENT
Start: 2018-10-21 | End: 2018-10-25 | Stop reason: HOSPADM

## 2018-10-21 RX ORDER — LISINOPRIL 20 MG/1
20 TABLET ORAL DAILY
Status: DISCONTINUED | OUTPATIENT
Start: 2018-10-22 | End: 2018-10-25 | Stop reason: HOSPADM

## 2018-10-21 RX ORDER — POTASSIUM CHLORIDE 7.45 MG/ML
10 INJECTION INTRAVENOUS PRN
Status: DISCONTINUED | OUTPATIENT
Start: 2018-10-21 | End: 2018-10-25 | Stop reason: HOSPADM

## 2018-10-21 RX ORDER — SODIUM CHLORIDE 0.9 % (FLUSH) 0.9 %
10 SYRINGE (ML) INJECTION PRN
Status: DISCONTINUED | OUTPATIENT
Start: 2018-10-21 | End: 2018-10-25 | Stop reason: HOSPADM

## 2018-10-21 RX ORDER — SODIUM CHLORIDE 9 MG/ML
INJECTION, SOLUTION INTRAVENOUS CONTINUOUS
Status: DISCONTINUED | OUTPATIENT
Start: 2018-10-21 | End: 2018-10-22

## 2018-10-21 RX ORDER — ALPRAZOLAM 0.5 MG/1
0.5 TABLET ORAL 3 TIMES DAILY PRN
Status: DISCONTINUED | OUTPATIENT
Start: 2018-10-21 | End: 2018-10-25 | Stop reason: HOSPADM

## 2018-10-21 RX ORDER — PANTOPRAZOLE SODIUM 40 MG/1
40 TABLET, DELAYED RELEASE ORAL
Status: DISCONTINUED | OUTPATIENT
Start: 2018-10-22 | End: 2018-10-24 | Stop reason: SDUPTHER

## 2018-10-21 RX ORDER — DIPHENHYDRAMINE HCL 25 MG
25 TABLET ORAL SEE ADMIN INSTRUCTIONS
Status: DISCONTINUED | OUTPATIENT
Start: 2018-10-21 | End: 2018-10-22

## 2018-10-21 RX ORDER — POTASSIUM CHLORIDE 20 MEQ/1
40 TABLET, EXTENDED RELEASE ORAL PRN
Status: DISCONTINUED | OUTPATIENT
Start: 2018-10-21 | End: 2018-10-25 | Stop reason: HOSPADM

## 2018-10-21 RX ORDER — POTASSIUM CHLORIDE 20MEQ/15ML
40 LIQUID (ML) ORAL PRN
Status: DISCONTINUED | OUTPATIENT
Start: 2018-10-21 | End: 2018-10-25 | Stop reason: HOSPADM

## 2018-10-21 RX ORDER — 0.9 % SODIUM CHLORIDE 0.9 %
250 INTRAVENOUS SOLUTION INTRAVENOUS ONCE
Status: DISCONTINUED | OUTPATIENT
Start: 2018-10-21 | End: 2018-10-22

## 2018-10-21 RX ORDER — FENTANYL CITRATE 50 UG/ML
INJECTION, SOLUTION INTRAMUSCULAR; INTRAVENOUS PRN
Status: DISCONTINUED | OUTPATIENT
Start: 2018-10-21 | End: 2018-10-24 | Stop reason: HOSPADM

## 2018-10-21 RX ORDER — ACETAMINOPHEN 325 MG/1
650 TABLET ORAL SEE ADMIN INSTRUCTIONS
Status: DISCONTINUED | OUTPATIENT
Start: 2018-10-21 | End: 2018-10-22

## 2018-10-21 RX ADMIN — SODIUM CHLORIDE 2967 ML: 9 INJECTION, SOLUTION INTRAVENOUS at 15:00

## 2018-10-21 ASSESSMENT — ENCOUNTER SYMPTOMS
COUGH: 0
RECTAL PAIN: 0
BACK PAIN: 0
WHEEZING: 0
SINUS PRESSURE: 0
PHOTOPHOBIA: 0
DIARRHEA: 0
CHEST TIGHTNESS: 0
VOICE CHANGE: 0
BLOOD IN STOOL: 1
RHINORRHEA: 0
CONSTIPATION: 0
ABDOMINAL DISTENTION: 0
NAUSEA: 0
SHORTNESS OF BREATH: 0
ABDOMINAL PAIN: 0
EYE REDNESS: 0
SORE THROAT: 0
COLOR CHANGE: 0
VOMITING: 0

## 2018-10-21 ASSESSMENT — PAIN SCALES - GENERAL: PAINLEVEL_OUTOF10: 0

## 2018-10-21 ASSESSMENT — PAIN - FUNCTIONAL ASSESSMENT: PAIN_FUNCTIONAL_ASSESSMENT: 0-10

## 2018-10-21 NOTE — ED NOTES
Patient states he feels better than he did after 2000cc of fluid and O2.  Patient alerted to room assignment on [address] [place] [address]  10/21/18 [id number]

## 2018-10-21 NOTE — H&P
History & Physical        Patient:  Sathish Cartwright. YOB: 1965  MRN: [id number]   PCP: Matty Tay MD         Acct: [de-identified]    Date of Admission: 10/21/2018  Date of Service: Patient seenand examined on 10/21/2018      Chief Complaint     Rectal bleeding    History of PresentIllness     The patient is a 45 y.o.  male with a history of colonoscopy in 10/13/2017 s/p polypectomy and recent hemorrhoidal banding on 09/15 who presented to the hospital with multiple episodes (at least 15 episodes of bright red rectal bleeding since yesterday afternoon associated with lightheadedness. He did not have any irregular bowel movements until yesterday and the have been unremarkable since the hemorrhoidal banding    He also has an asymptomatic abdominal diastases, for which he recently saw Dr. Zeinab Lee in the office in October. The patient has been taking a combination of hydrocodone/ibuprofen for pain. He otherwise denied fevers, chills,     His last hemoglobin was 14.2 on 5/5 and it is currently 13.3 today. His vitals today were significant for tachycardia to 118  and hypotension to 102/58    At the moment the patient is to be admitted as an inpatient with symptomatic acute blood loss anemia secondary to rectal bleeding. Another hemoglobin ws repeated prior to admission and it was 11.2. His vitals have now stabilized since his las fluid bolus. Dr. Yessica Rodriguez was therefore contacted and the patient will go for flexible sigmoidoscopy shortly.       Past Medical History         Diagnosis Date    Anxiety     Arthritis     Hyperlipemia     Hypertension        Past Surgical History         Procedure Laterality Date    BACK SURGERY  11/18/2016    L-5 and bulging disc repair 2 rods and 4 screws with fusion at [facility] with Dr. Michelle Hough  2002    COLONOSCOPY  10/13/2017    Dr Sarah Silva  2014    Dr Sarah Silva  2013   [address]  09/28/2018 Social History     Social History    Marital status:      Spouse name: N/A    Number of children: N/A    Years of education: N/A     Social History Main Topics    Smoking status: Former Smoker     Quit date: 1/23/2006    Smokeless tobacco: Never Used    Alcohol use Yes      Comment: occasional    Drug use: No    Sexual activity: Not Asked     Other Topics Concern    None     Social History Narrative    None         TOBACCO:   reports that he quit smoking about 12 years ago. He has never used smokeless tobacco.  ETOH:   reports that he drinks alcohol. Review of systems     Pertinent positives as noted in the HPI. All other systemsreviewed and negative. Review of Systems      Physical examination     BP (!) 121/57   Pulse 102   Temp 98.8 °F (37.1 °C)   Resp 18   Ht 6' 4\" (1.93 m)   Wt 218 lb (98.9 kg)   SpO2 100%   BMI 26.54 kg/m²     General appearance:  No apparent distress. Pale appearing. Sitting in be surrounded by family members  HEENT: Normocephalic. Pupils equal, round, and reactive to light. Extraocular motion intact. Conjunctivae clear. Nose symmetric without evidence of discharge. Oral mucosa moist without erythema or exudate. Neck: Supple. NoJVD. No carotid bruits. Trachea midline. No thyromegaly. Cardiovascular:  Regular rate and rhythm with normal S1/S2. No murmurs, rubs or gallops. Respiratory: Clear to auscultation, bilaterally without Rales/Wheezes/Rhonchi. Abdomen: Soft, non-tender, non-distended with normal bowel sounds. Musculoskeletal:  Full range of motion without deformity. Neurologic:  No focal sensory/motor deficits. Cranial nerves: II-XII intact. Lymphatic: No cervical lymphadenopathy. Psychiatric:  Alert and oriented. Vascular: Dorsalis pedis and radial pulses palpable bilaterally 2+. No peripheral edema. Genitourinary: Deferred. Rectal examination deferred  Skin: No visible rashes or lesions.       Labs and imaging     Recent Labs

## 2018-10-21 NOTE — ED NOTES
Pt called out informed that he could use a bedside commode and was provided with one at this time. Informed significant other that I would prefer her stay at the bedside with call light in reach. Shortly after leaving room significant other yelled for help and upon entering room pt still in bed appeared pale, diaphoretic, and significant other reports that pt had a syncopal episode. Dr. Garsia Salts at the bedside to re-assess pt at this time and blood work ordered. Pt repositioned in bed and informed that using a bedpan was necessary for his safety at this time. Significant other remains at the bedside will update primary nurse of incident.       Elier Villegas RN  10/21/18 [id number]

## 2018-10-21 NOTE — ED PROVIDER NOTES
times daily as needed for Pain for up to 30 days. Tara Kubas HYDROXYZINE (ATARAX) 25 MG TABLET    Take 1 tablet by mouth 3 times daily as needed for Itching    LISINOPRIL (PRINIVIL;ZESTRIL) 20 MG TABLET    Take 1 tablet by mouth daily    METAXALONE (SKELAXIN) 800 MG TABLET    Take 1 tablet by mouth 3  times daily    MULTIPLE VITAMINS-MINERALS (MULTIVITAMIN & MINERAL PO)    Take  by mouth. One daily--one bottle refill for one year     OMEPRAZOLE (PRILOSEC) 20 MG CAPSULE    Take 20 mg by mouth daily. POTASSIUM GLUCONATE PO    Take by mouth 99 mg up to 2 daily    TADALAFIL (CIALIS) 20 MG TABLET    Take 1 tablet by mouth as needed for Erectile Dysfunction       ALLERGIES     has No Known Allergies. FAMILY HISTORY     indicated that his mother is . He indicated that his father is . He indicated that both of his sisters are alive. He indicated that his brother is alive. He indicated that his maternal grandmother is . He indicated that his maternal grandfather is . He indicated that his paternal grandmother is . He indicated that his paternal grandfather is . family history includes Cancer in his father and mother. SOCIAL HISTORY      reports that he quit smoking about 12 years ago. He has never used smokeless tobacco. He reports that he drinks alcohol. He reports that he does not use drugs. PHYSICAL EXAM     INITIAL VITALS:  height is 6' 4\" (1.93 m) and weight is 218 lb (98.9 kg). His temperature is 98.8 °F (37.1 °C). His blood pressure is 121/57 (abnormal) and his pulse is 109. His respiration is 16 and oxygen saturation is 99%. Physical Exam   Constitutional: He is oriented to person, place, and time. He appears well-developed and well-nourished. HENT:   Head: Normocephalic.    Right Ear: External ear normal.   Left Ear: External ear normal.   Nose: Nose normal.   Mouth/Throat: Uvula is midline and oropharynx is clear and moist.   Eyes: Pupils are equal, round, this patient's condition which required my urgent intervention. Total critical care time was 30 minutes. This excludes any time for separately reportable procedures. CONSULTS:  Dr. Jaspreet Espana:  None    FINAL IMPRESSION      1. Lower GI bleed    2. Internal hemorrhoids          DISPOSITION/PLAN   Patient admitted to hospitalist service, went to ENDO suite before admission to floor    PATIENT REFERRED TO:  Tyrone Berg MD  [address], [address] [facility] [address]:  New Prescriptions    No medications on file       (Please note that portions of this note were completedwith a voice recognition program.  Efforts were made to edit the dictations but occasionally words are mis-transcribed.)    Scribe:  Nely Mike 12/23/16 10:31 AM Scribing for and in the presence of Les Vasquez CNP.     Signed by: Manju Kulkarni, 10/21/18 7:32 PM    Provider:  I personally performed the services described in the documentation, reviewedand edited the documentation which was dictated to the scribe in my presence, and it accurately records my words and actions.     Les Vasquez CNP 10/21/18 7:32 PM    IVANNA Paiz - MATT          [employer], IVANNA - CNP  10/21/18 1933

## 2018-10-21 NOTE — Clinical Note
Patient Class: Inpatient [101]   REQUIRED: Diagnosis: Rectal bleeding [566194]   Estimated Length of Stay: Estimated stay of more than 2 midnights   Future Attending Provider: Jojo Simmons [[id number]]   Admitting Provider: Jojo Simmons [[id number]]   Telemetry Bed Required?: Yes

## 2018-10-22 LAB
ANION GAP SERPL CALCULATED.3IONS-SCNC: 13 MEQ/L (ref 8–16)
BUN BLDV-MCNC: 11 MG/DL (ref 7–22)
CALCIUM SERPL-MCNC: 7.2 MG/DL (ref 8.5–10.5)
CHLORIDE BLD-SCNC: 106 MEQ/L (ref 98–111)
CO2: 20 MEQ/L (ref 23–33)
CREAT SERPL-MCNC: 0.4 MG/DL (ref 0.4–1.2)
ERYTHROCYTE [DISTWIDTH] IN BLOOD BY AUTOMATED COUNT: 12.5 % (ref 11.5–14.5)
ERYTHROCYTE [DISTWIDTH] IN BLOOD BY AUTOMATED COUNT: 45 FL (ref 35–45)
GFR SERPL CREATININE-BSD FRML MDRD: > 90 ML/MIN/1.73M2
GLUCOSE BLD-MCNC: 110 MG/DL (ref 70–108)
HCT VFR BLD CALC: 23.9 % (ref 42–52)
HCT VFR BLD CALC: 24.6 % (ref 42–52)
HCT VFR BLD CALC: 25.8 % (ref 42–52)
HEMOGLOBIN: 8.1 GM/DL (ref 14–18)
HEMOGLOBIN: 8.5 GM/DL (ref 14–18)
HEMOGLOBIN: 8.5 GM/DL (ref 14–18)
MCH RBC QN AUTO: 32.4 PG (ref 26–33)
MCHC RBC AUTO-ENTMCNC: 32.9 GM/DL (ref 32.2–35.5)
MCV RBC AUTO: 98.5 FL (ref 80–94)
PLATELET # BLD: 149 THOU/MM3 (ref 130–400)
PMV BLD AUTO: 9.9 FL (ref 9.4–12.4)
POTASSIUM REFLEX MAGNESIUM: 3.9 MEQ/L (ref 3.5–5.2)
RBC # BLD: 2.62 MILL/MM3 (ref 4.7–6.1)
SODIUM BLD-SCNC: 139 MEQ/L (ref 135–145)
WBC # BLD: 9.2 THOU/MM3 (ref 4.8–10.8)

## 2018-10-22 PROCEDURE — 36415 COLL VENOUS BLD VENIPUNCTURE: CPT

## 2018-10-22 PROCEDURE — 2709999900 HC NON-CHARGEABLE SUPPLY

## 2018-10-22 PROCEDURE — 85018 HEMOGLOBIN: CPT

## 2018-10-22 PROCEDURE — 2140000000 HC CCU INTERMEDIATE R&B

## 2018-10-22 PROCEDURE — 85027 COMPLETE CBC AUTOMATED: CPT

## 2018-10-22 PROCEDURE — 99233 SBSQ HOSP IP/OBS HIGH 50: CPT | Performed by: INTERNAL MEDICINE

## 2018-10-22 PROCEDURE — 80048 BASIC METABOLIC PNL TOTAL CA: CPT

## 2018-10-22 PROCEDURE — 85014 HEMATOCRIT: CPT

## 2018-10-22 PROCEDURE — 2580000003 HC RX 258: Performed by: INTERNAL MEDICINE

## 2018-10-22 PROCEDURE — 6370000000 HC RX 637 (ALT 250 FOR IP): Performed by: INTERNAL MEDICINE

## 2018-10-22 RX ORDER — SODIUM CHLORIDE, SODIUM LACTATE, POTASSIUM CHLORIDE, CALCIUM CHLORIDE 600; 310; 30; 20 MG/100ML; MG/100ML; MG/100ML; MG/100ML
INJECTION, SOLUTION INTRAVENOUS CONTINUOUS
Status: DISCONTINUED | OUTPATIENT
Start: 2018-10-22 | End: 2018-10-23

## 2018-10-22 RX ORDER — METAXALONE 800 MG/1
800 TABLET ORAL 3 TIMES DAILY PRN
COMMUNITY
End: 2019-08-19

## 2018-10-22 RX ORDER — POLYETHYLENE GLYCOL 3350 17 G/17G
17 POWDER, FOR SOLUTION ORAL DAILY
Status: DISCONTINUED | OUTPATIENT
Start: 2018-10-22 | End: 2018-10-25 | Stop reason: HOSPADM

## 2018-10-22 RX ADMIN — SODIUM CHLORIDE, POTASSIUM CHLORIDE, SODIUM LACTATE AND CALCIUM CHLORIDE: 600; 310; 30; 20 INJECTION, SOLUTION INTRAVENOUS at 09:15

## 2018-10-22 RX ADMIN — SODIUM CHLORIDE: 9 INJECTION, SOLUTION INTRAVENOUS at 03:01

## 2018-10-22 RX ADMIN — ATORVASTATIN CALCIUM 40 MG: 40 TABLET, FILM COATED ORAL at 23:02

## 2018-10-22 RX ADMIN — PANTOPRAZOLE SODIUM 40 MG: 40 TABLET, DELAYED RELEASE ORAL at 09:15

## 2018-10-22 RX ADMIN — LISINOPRIL 20 MG: 20 TABLET ORAL at 17:29

## 2018-10-22 ASSESSMENT — PAIN SCALES - GENERAL: PAINLEVEL_OUTOF10: 0

## 2018-10-22 NOTE — CONSULTS
other etiology such as  diverticular bleeding, but again, post [facility] hemorrhoidal banding,  bleeding is strongly suspected despite the somewhat unusual time  frame. 2.  Status post previous colonoscopy by Dr. Alex Langston last year. 3.  Hypertension and hyperlipidemia. PLAN:  1. Urgent flexible sigmoidoscopy with possible endoscopic therapy. 2.  Any further plans after above and based on clinical course. Thanks for allowing us to see the patient and participate in his care.         Ever Dhaliwal M.D.    D: 10/21/2018 19:43:41       T: 10/21/2018 22:20:29     [email or web address]  Job#: [id number]     Doc#: [id number]    CC:  Antwan Rollins MD

## 2018-10-22 NOTE — PROCEDURES
[address] [id number]                                PROCEDURE NOTE    PATIENT NAME: Godwin Tavares                   :         1965  MED REC NO:   [id number]                           ROOM:       0034  ACCOUNT NO:   [de-identified]                           ADMIT DATE:  10/21/2018  PROVIDER:     Avila Harvey M.D.    Pao Right:  10/21/2018    PROCEDURE TYPE:  Flexible sigmoidoscopy followed by colonoscopy,  followed by upper endoscopy. SURGEON:  Quoc Harvey M.D. INSTRUMENTS:  Olympus video flexible sigmoidoscope, video colonoscope,  and video upper endoscope. MEDICATIONS:  Fentanyl totaling 200 mcg, Versed totaling 10 mg. BRIEF HISTORY:  The patient is a 28-year-old who presented to [address] [place] [address] Emergency Department late this afternoon with  recurrent hematochezia. Please see my full consult note for all  details. He is approximately four to five weeks' status post [facility]  hemorrhoidal banding for bleeding hemorrhoids. He had increased  bleeding today prompting his visit to the emergency room. Hemoglobin  dropped in the ER and urgent flexible sigmoidoscopy was planned. This  was subsequently followed by colonoscopy. Following this, I spoke  with the patient's wife who was present in the recovery area and  consent was provided for urgent upper endoscopy due to the  colonoscopic findings. The procedure was discussed with the patient  (and his wife as noted above) and all expressed understanding and did  wish for us to proceed. DESCRIPTION OF PROCEDURE:  The patient arrived to the endoscopy  department from the emergency department. He was placed on the  appropriate monitoring devices. He was placed in the left lateral  decubitus position. Fentanyl and Versed were given in order to  achieve and maintain conscious sedation.   Following performance of  unremarkable digital rectal exam, the Olympus video flexible  sigmoidoscope was inserted gently through the anal canal and into the  rectum. On entering the rectum, there was a large amount of blood and  clots. Extensive washing and suctioning were done; however, the clots  were refractory to suctioning and a BioVac was subsequently used, but  even with this, the clots were unable to be completely removed. After  some washing and suctioning, there was identified a discrete  white-colored based ulcer of moderate depth. There was an obvious  large visible vessel within the base of the ulcer towards the lateral  margin. This is shown well in photographs #1, #2, and #3. This was  the [facility] hemorrhoidal banding site. Subsequently, the visible vessel  was treated in standard fashion using the heater probe at a setting of  20 joules. Several applications were made and the vessel was  cauterized. Following therapy, the vessel was obliterated and the  site looked good as shown in photographs #4 and #5. Additional  efforts were made with washing and suctioning in hopes of visualizing  the entire rectum, but this proved unsuccessful due to refractory  clots despite the use of the BioVac. The scope was gently retroflexed  and the retroflexed view was otherwise unremarkable. Subsequently,  the scope was advanced to 50 cm and there continued to be bright red  blood present. Therefore, the flexible sigmoidoscope was removed and  a standard adult video colonoscope was obtained. This scope was then  carefully advanced through the length of the colon into the base of  the cecum, identified by the presence of the appendiceal orifice,  cecal strap, and ileocecal valve. In the cecum, there was some stool  debris though the remainder of the colon lacked any stool. There were  a couple of small pieces towards the cecum. There was blood  throughout the entire colon. Visualization was limited.   Also, the  patient was noted to have a redundant preparation to the cecum and  ileocecal valve. There was some tinged blood in the distal ileum. 8.  Upper endoscopy demonstrated small hiatal hernia, lower esophageal  sphincter incompetence, and slight irregularity in the contour of the  Z-line. 9.  Lumpy-bumpy mucosa throughout the gastric body. Endoscopic  appearance benign. 10. No blood or bleeding in the stomach or duodenum. ASSESSMENT:  As described above, flexible sigmoidoscopy followed by  colonoscopy, followed by upper endoscopy performed. Findings as noted  above. The patient was found to have a large visible vessel at the  [facility] hemorrhoidal banding site. This was cauterized with heater probe  and obliterated. Following treatment, the site looked good. The fact  that blood was present throughout the entire colon was thought to be  somewhat atypical.  There was a very large amount of blood present. A  couple of diverticula were noted and it is possible though seems less  likely that there were two sources of bleeding. I suspect the most  likely cause is post [facility] hemorrhoidal banding with severe lower GI  bleeding, again status post endoscopic therapy as noted. PLAN:  1. The patient will be admitted for further care. 2.  Stat H and H, type and cross for 2 units. I spoke to the blood  bank and blood is being readied. 3.  Transfuse pending following stat H and H and based on clinical  course. 4.  Any further plans based on the patient's upcoming clinical course.         Trish Stuart M.D.    D: 10/21/2018 21:23:35       T: 10/22/2018 3:06:55     [id number]  Job#: [id number]     Doc#: [id number]    CC:  Matty Tay MD

## 2018-10-22 NOTE — PROGRESS NOTES
Patient arrived per cart to 3B. Heart monitor applied and vitals taken. Admission paperwork completed. Explained to patient that [facility] is not responsible for any lost or stolen items. Patient verbalized understanding. Oriented to room and use of call light and bed controls. Patient denies pain or needs. No signs of distress noted. Bed locked & in low position, side-rails up x2. Call light in reach. Reminded patient to call nurse if any needs arise.    \

## 2018-10-22 NOTE — PROGRESS NOTES
Pt Name: Noelle Ma. MRN: [id number]  [id number]  YOB: 1965  Admit Date: 10/21/2018  2:30 PM  Date of evaluation: 10/22/2018  Primary Care Physician: Jose L Dodd MD   3B-34/034-A   Dictating for Dr Soto Cardoza denies abdominal pain, nausea, and vomiting. No stools overnight. Passing some gas. Pt asking for diet. Only complaint is some mild bloating. Family at bedside. Discussed endoscopy results. O  BP (!) 143/70   Pulse 97   Temp 98.1 °F (36.7 °C) (Oral)   Resp 16   Ht 6' 4\" (1.93 m)   Wt 218 lb (98.9 kg)   SpO2 98%   BMI 26.54 kg/m²   VSS, afebrile  Alert and oriented  Resp:CTAB  Chest: RRR  Abd:soft, non-tender, non-distended with active BS's noted  Ext: no edema    EGD/Flex sigmoid/colnooscopy 10-22-18  IMPRESSION:  1. Flexible sigmoidoscopy followed by complete colonoscopy, followed  by upper endoscopy performed as outlined above. 2.  Large amounts of blood and clot throughout the colon. This was  greatest in the left colon, less so in the right colon, but blood  extended all the way to the cecum and some blood tinging in the distal  ileum. 3.  A couple of diverticula incidentally noted in the colon. 4.  [facility] hemorrhoidal banding site demonstrated moderately deep  white-colored based ulcer about 6 mm in size with a large visible  vessel on the lateral margin. This was treated to obliteration with  heater probe using 20 joules in a standard fashion without difficulty. Following therapy, the site looked very good. 5.  Otherwise, the rectum was normal within limits of visualization. Clots prevented complete visualization. 6.  A couple of diverticula incidentally noted in the left colon. 7.  Otherwise, unremarkable exam without preparation to the cecum and  ileocecal valve. There was some tinged blood in the distal ileum.   8.  Upper endoscopy demonstrated small hiatal hernia, lower esophageal  sphincter incompetence, and slight irregularity in the contour of the  Z-line. 9.  Lumpy-bumpy mucosa throughout the gastric body. Endoscopic  appearance benign. 10. No blood or bleeding in the stomach or duodenum.     ASSESSMENT:  As described above, flexible sigmoidoscopy followed by  colonoscopy, followed by upper endoscopy performed. Findings as noted  above. The patient was found to have a large visible vessel at the  [facility] hemorrhoidal banding site. This was cauterized with heater probe  and obliterated. Following treatment, the site looked good. The fact  that blood was present throughout the entire colon was thought to be  somewhat atypical.  There was a very large amount of blood present. A  couple of diverticula were noted and it is possible though seems less  likely that there were two sources of bleeding. I suspect the most  likely cause is post [facility] hemorrhoidal banding with severe lower GI  bleeding, again status post endoscopic therapy as noted. PLAN:  1. The patient will be admitted for further care. 2.  Stat H and H, type and cross for 2 units. I spoke to the blood  bank and blood is being readied. 3.  Transfuse pending following stat H and H and based on clinical  course. 4.  Any further plans based on the patient's upcoming clinical course. Assessment  1. Lower GI bleed, s/p CRH banding 9-15-18 for bleeding hemorrhoids. Moderately deep white-colored based ulcer about 6 mm in size with a large visible vessel on the lateral margin. This was treated to obliteration with heater probe using 20 joules in a standard fashion without difficulty. Following therapy, the site looked very good. Large amounts of blood and clot throughout the colon. This was greatest in the left colon, less so in the right colon, but blood extended all the way to the cecum and some blood tinging in the distal ileum. A couple of diverticula incidentally noted in the colon.   2. EGD demonstrated small hiatal hernia, lower esophageal sphincter incompetence, and slight

## 2018-10-23 PROBLEM — R57.8 HEMORRHAGIC SHOCK (HCC): Status: ACTIVE | Noted: 2018-10-23

## 2018-10-23 PROBLEM — R57.1 HYPOVOLEMIC SHOCK (HCC): Status: ACTIVE | Noted: 2018-10-23

## 2018-10-23 LAB
BASOPHILS # BLD: 0.2 %
BASOPHILS ABSOLUTE: 0 THOU/MM3 (ref 0–0.1)
EOSINOPHIL # BLD: 1.9 %
EOSINOPHILS ABSOLUTE: 0.1 THOU/MM3 (ref 0–0.4)
ERYTHROCYTE [DISTWIDTH] IN BLOOD BY AUTOMATED COUNT: 12.9 % (ref 11.5–14.5)
ERYTHROCYTE [DISTWIDTH] IN BLOOD BY AUTOMATED COUNT: 44.8 FL (ref 35–45)
HCT VFR BLD CALC: 23.9 % (ref 42–52)
HCT VFR BLD CALC: 24.2 % (ref 42–52)
HEMOGLOBIN: 8.1 GM/DL (ref 14–18)
HEMOGLOBIN: 8.2 GM/DL (ref 14–18)
IMMATURE GRANS (ABS): 0.02 THOU/MM3 (ref 0–0.07)
IMMATURE GRANULOCYTES: 0.4 %
LYMPHOCYTES # BLD: 33.2 %
LYMPHOCYTES ABSOLUTE: 1.6 THOU/MM3 (ref 1–4.8)
MCH RBC QN AUTO: 32.4 PG (ref 26–33)
MCHC RBC AUTO-ENTMCNC: 33.9 GM/DL (ref 32.2–35.5)
MCV RBC AUTO: 95.7 FL (ref 80–94)
MONOCYTES # BLD: 7.5 %
MONOCYTES ABSOLUTE: 0.4 THOU/MM3 (ref 0.4–1.3)
NUCLEATED RED BLOOD CELLS: 0 /100 WBC
PLATELET # BLD: 152 THOU/MM3 (ref 130–400)
PMV BLD AUTO: 9.4 FL (ref 9.4–12.4)
RBC # BLD: 2.53 MILL/MM3 (ref 4.7–6.1)
SEG NEUTROPHILS: 56.8 %
SEGMENTED NEUTROPHILS ABSOLUTE COUNT: 2.7 THOU/MM3 (ref 1.8–7.7)
WBC # BLD: 4.7 THOU/MM3 (ref 4.8–10.8)

## 2018-10-23 PROCEDURE — 85025 COMPLETE CBC W/AUTO DIFF WBC: CPT

## 2018-10-23 PROCEDURE — 2580000003 HC RX 258: Performed by: INTERNAL MEDICINE

## 2018-10-23 PROCEDURE — 6370000000 HC RX 637 (ALT 250 FOR IP): Performed by: INTERNAL MEDICINE

## 2018-10-23 PROCEDURE — 90686 IIV4 VACC NO PRSV 0.5 ML IM: CPT | Performed by: INTERNAL MEDICINE

## 2018-10-23 PROCEDURE — 99233 SBSQ HOSP IP/OBS HIGH 50: CPT | Performed by: INTERNAL MEDICINE

## 2018-10-23 PROCEDURE — G0008 ADMIN INFLUENZA VIRUS VAC: HCPCS | Performed by: INTERNAL MEDICINE

## 2018-10-23 PROCEDURE — 36415 COLL VENOUS BLD VENIPUNCTURE: CPT

## 2018-10-23 PROCEDURE — 0DJD8ZZ INSPECTION OF LOWER INTESTINAL TRACT, VIA NATURAL OR ARTIFICIAL OPENING ENDOSCOPIC: ICD-10-PCS | Performed by: INTERNAL MEDICINE

## 2018-10-23 PROCEDURE — 85018 HEMOGLOBIN: CPT

## 2018-10-23 PROCEDURE — 6360000002 HC RX W HCPCS: Performed by: INTERNAL MEDICINE

## 2018-10-23 PROCEDURE — 85014 HEMATOCRIT: CPT

## 2018-10-23 PROCEDURE — 2140000000 HC CCU INTERMEDIATE R&B

## 2018-10-23 PROCEDURE — 2709999900 HC NON-CHARGEABLE SUPPLY

## 2018-10-23 RX ORDER — SODIUM CHLORIDE 9 MG/ML
INJECTION, SOLUTION INTRAVENOUS CONTINUOUS
Status: ACTIVE | OUTPATIENT
Start: 2018-10-23 | End: 2018-10-24

## 2018-10-23 RX ORDER — POLYETHYLENE GLYCOL 3350 17 G/17G
17 POWDER, FOR SOLUTION ORAL DAILY
Qty: 527 G | Refills: 1 | Status: SHIPPED | OUTPATIENT
Start: 2018-10-23 | End: 2018-10-25

## 2018-10-23 RX ADMIN — ATORVASTATIN CALCIUM 40 MG: 40 TABLET, FILM COATED ORAL at 20:02

## 2018-10-23 RX ADMIN — INFLUENZA A VIRUS A/MICHIGAN/45/2015 X-275 (H1N1) ANTIGEN (FORMALDEHYDE INACTIVATED), INFLUENZA A VIRUS A/SINGAPORE/INFIMH-16-0019/2016 IVR-186 (H3N2) ANTIGEN (FORMALDEHYDE INACTIVATED), INFLUENZA B VIRUS B/PHUKET/3073/2013 ANTIGEN (FORMALDEHYDE INACTIVATED), AND INFLUENZA B VIRUS B/MARYLAND/15/2016 BX-69A ANTIGEN (FORMALDEHYDE INACTIVATED) 0.5 ML: 15; 15; 15; 15 INJECTION, SUSPENSION INTRAMUSCULAR at 09:18

## 2018-10-23 RX ADMIN — IRON SUCROSE 200 MG: 20 INJECTION, SOLUTION INTRAVENOUS at 16:11

## 2018-10-23 RX ADMIN — PANTOPRAZOLE SODIUM 40 MG: 40 TABLET, DELAYED RELEASE ORAL at 05:23

## 2018-10-23 RX ADMIN — Medication 10 ML: at 20:03

## 2018-10-23 RX ADMIN — SODIUM CHLORIDE: 9 INJECTION, SOLUTION INTRAVENOUS at 14:38

## 2018-10-23 RX ADMIN — LISINOPRIL 20 MG: 20 TABLET ORAL at 09:22

## 2018-10-23 RX ADMIN — POLYETHYLENE GLYCOL (3350) 17 G: 17 POWDER, FOR SOLUTION ORAL at 09:22

## 2018-10-23 RX ADMIN — METAXALONE 400 MG: 800 TABLET ORAL at 20:03

## 2018-10-23 ASSESSMENT — PAIN SCALES - GENERAL
PAINLEVEL_OUTOF10: 0
PAINLEVEL_OUTOF10: 0

## 2018-10-23 NOTE — PROGRESS NOTES
Hospitalist Progress Note    Patient:  Moreno Suero. Unit/Bed:3B-34/034-A    YOB: 1965    MRN: [id number]       Acct: [de-identified]     PCP: Rain West MD    Date of Admission: 10/21/2018    Chief Complaint: [facility] Course:  Admitted for hematochezia, s/p GI eval with EGD and colonoscopy, found to have ulcer at base of recently banded hemorrhoid with visible vessel. Treatment given and his hgb has stabilized over 24 hours. There was notation of blood as far back as the ileum, unclear source but no active bleeding in EGD reported. May need to consider capsule endoscopy if he re bleeds. Subjective: no acute events overnight. Pt reports feeling much better than when he first came in. Does continue to endorse some symptoms of dizziness with ambulation. Pt had one bowel movement which was dark and black.        Medications:  Reviewed    Infusion Medications    sodium chloride       Scheduled Medications    polyethylene glycol  17 g Oral Daily    docusate  100 mg Oral Daily    pantoprazole  40 mg Oral QAM AC    metaxalone  400 mg Oral TID    lisinopril  20 mg Oral Daily    atorvastatin  40 mg Oral Nightly    sodium chloride flush  10 mL Intravenous 2 times per day     PRN Meds: ALPRAZolam, hydrOXYzine, HYDROcodone-acetaminophen, sodium chloride flush, magnesium hydroxide, ondansetron, potassium chloride **OR** potassium chloride **OR** potassium chloride, potassium chloride, magnesium sulfate, fentaNYL, midazolam      Intake/Output Summary (Last 24 hours) at 10/23/18 1132  Last data filed at 10/23/18 1107   Gross per 24 hour   Intake          4796.48 ml   Output              650 ml   Net          4146.48 ml       Diet:  DIET GENERAL; No Added Salt (3-4 GM)    Exam:  BP (!) 141/77   Pulse 104   Temp 98.6 °F (37 °C) (Oral)   Resp 16   Ht 6' 4\" (1.93 m)   Wt 218 lb (98.9 kg)   SpO2 99%   BMI 26.54 kg/m²     General appearance: No apparent distress, appears

## 2018-10-23 NOTE — PLAN OF CARE
Problem: Falls - Risk of:  Goal: Will remain free from falls  Will remain free from falls   Outcome: Ongoing  Hourly rounding continues, call light within reach. Goal: Absence of physical injury  Absence of physical injury   Outcome: Ongoing  Patient free from injury. Problem: Bowel Function - Altered:  Goal: Bowel elimination is within specified parameters  Bowel elimination is within specified parameters  Outcome: Ongoing  2 bowel movements dark in color today. Problem: Nausea/Vomiting:  Goal: Able to drink  Able to drink  Outcome: Ongoing  Patient able to drink with no nausea and vomiting  Goal: Able to eat  Able to eat  Outcome: Ongoing  Patient able to eat and drink with no nausea    Comments: Care plan reviewed with patient and family. Patient and family verbalize understanding of the plan of care and contribute to goal setting.

## 2018-10-23 NOTE — PROGRESS NOTES
Patient is awake and in bed. The patient's pain and comfort has been addressed and has not needs at this time. He is due to start another bag of 0.9% sodium chloride but he wants to speak with his doctor first so the bag is in is room ready to hang. I have reported off with his primary nurse.  Electronically signed by Jonny Juárez on 10/23/2018 at 1:51 PM

## 2018-10-24 ENCOUNTER — ANESTHESIA EVENT (OUTPATIENT)
Dept: ENDOSCOPY | Age: 53
DRG: 393 | End: 2018-10-24
Payer: COMMERCIAL

## 2018-10-24 ENCOUNTER — ANESTHESIA (OUTPATIENT)
Dept: ENDOSCOPY | Age: 53
DRG: 393 | End: 2018-10-24
Payer: COMMERCIAL

## 2018-10-24 VITALS
SYSTOLIC BLOOD PRESSURE: 116 MMHG | OXYGEN SATURATION: 100 % | DIASTOLIC BLOOD PRESSURE: 61 MMHG | RESPIRATION RATE: 13 BRPM

## 2018-10-24 LAB
ANION GAP SERPL CALCULATED.3IONS-SCNC: 12 MEQ/L (ref 8–16)
BUN BLDV-MCNC: 6 MG/DL (ref 7–22)
CALCIUM SERPL-MCNC: 8.4 MG/DL (ref 8.5–10.5)
CHLORIDE BLD-SCNC: 106 MEQ/L (ref 98–111)
CO2: 26 MEQ/L (ref 23–33)
CREAT SERPL-MCNC: 0.7 MG/DL (ref 0.4–1.2)
ERYTHROCYTE [DISTWIDTH] IN BLOOD BY AUTOMATED COUNT: 12.6 % (ref 11.5–14.5)
ERYTHROCYTE [DISTWIDTH] IN BLOOD BY AUTOMATED COUNT: 43.7 FL (ref 35–45)
GFR SERPL CREATININE-BSD FRML MDRD: > 90 ML/MIN/1.73M2
GLUCOSE BLD-MCNC: 109 MG/DL (ref 70–108)
HCT VFR BLD CALC: 21.7 % (ref 42–52)
HCT VFR BLD CALC: 21.9 % (ref 42–52)
HCT VFR BLD CALC: 26 % (ref 42–52)
HEMOGLOBIN: 7.3 GM/DL (ref 14–18)
HEMOGLOBIN: 7.5 GM/DL (ref 14–18)
HEMOGLOBIN: 9 GM/DL (ref 14–18)
MAGNESIUM: 2 MG/DL (ref 1.6–2.4)
MCH RBC QN AUTO: 32.3 PG (ref 26–33)
MCHC RBC AUTO-ENTMCNC: 33.6 GM/DL (ref 32.2–35.5)
MCV RBC AUTO: 96 FL (ref 80–94)
PLATELET # BLD: 150 THOU/MM3 (ref 130–400)
PMV BLD AUTO: 9.7 FL (ref 9.4–12.4)
POTASSIUM SERPL-SCNC: 4.2 MEQ/L (ref 3.5–5.2)
RBC # BLD: 2.26 MILL/MM3 (ref 4.7–6.1)
SODIUM BLD-SCNC: 144 MEQ/L (ref 135–145)
WBC # BLD: 3.9 THOU/MM3 (ref 4.8–10.8)

## 2018-10-24 PROCEDURE — 2580000003 HC RX 258: Performed by: NURSE PRACTITIONER

## 2018-10-24 PROCEDURE — 2580000003 HC RX 258: Performed by: INTERNAL MEDICINE

## 2018-10-24 PROCEDURE — 6360000002 HC RX W HCPCS: Performed by: INTERNAL MEDICINE

## 2018-10-24 PROCEDURE — 7100000001 HC PACU RECOVERY - ADDTL 15 MIN: Performed by: INTERNAL MEDICINE

## 2018-10-24 PROCEDURE — 99233 SBSQ HOSP IP/OBS HIGH 50: CPT | Performed by: INTERNAL MEDICINE

## 2018-10-24 PROCEDURE — 80048 BASIC METABOLIC PNL TOTAL CA: CPT

## 2018-10-24 PROCEDURE — 7100000000 HC PACU RECOVERY - FIRST 15 MIN: Performed by: INTERNAL MEDICINE

## 2018-10-24 PROCEDURE — 85014 HEMATOCRIT: CPT

## 2018-10-24 PROCEDURE — 36415 COLL VENOUS BLD VENIPUNCTURE: CPT

## 2018-10-24 PROCEDURE — 0DJD8ZZ INSPECTION OF LOWER INTESTINAL TRACT, VIA NATURAL OR ARTIFICIAL OPENING ENDOSCOPIC: ICD-10-PCS | Performed by: INTERNAL MEDICINE

## 2018-10-24 PROCEDURE — C9113 INJ PANTOPRAZOLE SODIUM, VIA: HCPCS | Performed by: INTERNAL MEDICINE

## 2018-10-24 PROCEDURE — 85027 COMPLETE CBC AUTOMATED: CPT

## 2018-10-24 PROCEDURE — 2140000000 HC CCU INTERMEDIATE R&B

## 2018-10-24 PROCEDURE — 3609008300 HC SIGMOIDOSCOPY W/BIOPSY SINGLE/MULTIPLE: Performed by: INTERNAL MEDICINE

## 2018-10-24 PROCEDURE — 2580000003 HC RX 258: Performed by: NURSE ANESTHETIST, CERTIFIED REGISTERED

## 2018-10-24 PROCEDURE — 2500000003 HC RX 250 WO HCPCS: Performed by: NURSE ANESTHETIST, CERTIFIED REGISTERED

## 2018-10-24 PROCEDURE — 83735 ASSAY OF MAGNESIUM: CPT

## 2018-10-24 PROCEDURE — 85018 HEMOGLOBIN: CPT

## 2018-10-24 PROCEDURE — 36430 TRANSFUSION BLD/BLD COMPNT: CPT

## 2018-10-24 PROCEDURE — 6370000000 HC RX 637 (ALT 250 FOR IP): Performed by: INTERNAL MEDICINE

## 2018-10-24 PROCEDURE — 3700000000 HC ANESTHESIA ATTENDED CARE: Performed by: INTERNAL MEDICINE

## 2018-10-24 PROCEDURE — 3700000001 HC ADD 15 MINUTES (ANESTHESIA): Performed by: INTERNAL MEDICINE

## 2018-10-24 PROCEDURE — 6360000002 HC RX W HCPCS: Performed by: NURSE ANESTHETIST, CERTIFIED REGISTERED

## 2018-10-24 PROCEDURE — 2709999900 HC NON-CHARGEABLE SUPPLY

## 2018-10-24 RX ORDER — PANTOPRAZOLE SODIUM 40 MG/10ML
80 INJECTION, POWDER, LYOPHILIZED, FOR SOLUTION INTRAVENOUS ONCE
Status: DISCONTINUED | OUTPATIENT
Start: 2018-10-24 | End: 2018-10-24 | Stop reason: CLARIF

## 2018-10-24 RX ORDER — PROPOFOL 10 MG/ML
INJECTION, EMULSION INTRAVENOUS PRN
Status: DISCONTINUED | OUTPATIENT
Start: 2018-10-24 | End: 2018-10-24 | Stop reason: SDUPTHER

## 2018-10-24 RX ORDER — 0.9 % SODIUM CHLORIDE 0.9 %
10 VIAL (ML) INJECTION EVERY 12 HOURS SCHEDULED
Status: DISCONTINUED | OUTPATIENT
Start: 2018-10-24 | End: 2018-10-24 | Stop reason: ALTCHOICE

## 2018-10-24 RX ORDER — SODIUM CHLORIDE 9 MG/ML
INJECTION, SOLUTION INTRAVENOUS CONTINUOUS
Status: DISCONTINUED | OUTPATIENT
Start: 2018-10-24 | End: 2018-10-25 | Stop reason: HOSPADM

## 2018-10-24 RX ORDER — SODIUM CHLORIDE 9 MG/ML
INJECTION, SOLUTION INTRAVENOUS CONTINUOUS PRN
Status: DISCONTINUED | OUTPATIENT
Start: 2018-10-24 | End: 2018-10-24 | Stop reason: SDUPTHER

## 2018-10-24 RX ORDER — 0.9 % SODIUM CHLORIDE 0.9 %
10 VIAL (ML) INJECTION PRN
Status: DISCONTINUED | OUTPATIENT
Start: 2018-10-24 | End: 2018-10-24 | Stop reason: ALTCHOICE

## 2018-10-24 RX ORDER — OCTREOTIDE ACETATE 50 UG/ML
50 INJECTION, SOLUTION INTRAVENOUS; SUBCUTANEOUS ONCE
Status: DISCONTINUED | OUTPATIENT
Start: 2018-10-24 | End: 2018-10-24

## 2018-10-24 RX ORDER — LIDOCAINE HYDROCHLORIDE 20 MG/ML
INJECTION, SOLUTION INFILTRATION; PERINEURAL PRN
Status: DISCONTINUED | OUTPATIENT
Start: 2018-10-24 | End: 2018-10-24 | Stop reason: SDUPTHER

## 2018-10-24 RX ORDER — OCTREOTIDE ACETATE 50 UG/ML
50 INJECTION, SOLUTION INTRAVENOUS; SUBCUTANEOUS ONCE
Status: COMPLETED | OUTPATIENT
Start: 2018-10-24 | End: 2018-10-24

## 2018-10-24 RX ORDER — 0.9 % SODIUM CHLORIDE 0.9 %
250 INTRAVENOUS SOLUTION INTRAVENOUS ONCE
Status: DISCONTINUED | OUTPATIENT
Start: 2018-10-24 | End: 2018-10-24

## 2018-10-24 RX ORDER — PANTOPRAZOLE SODIUM 40 MG/10ML
40 INJECTION, POWDER, LYOPHILIZED, FOR SOLUTION INTRAVENOUS 2 TIMES DAILY
Status: DISCONTINUED | OUTPATIENT
Start: 2018-10-24 | End: 2018-10-25

## 2018-10-24 RX ADMIN — SODIUM CHLORIDE 80 MG: 9 INJECTION, SOLUTION INTRAVENOUS at 05:52

## 2018-10-24 RX ADMIN — Medication 10 ML: at 20:32

## 2018-10-24 RX ADMIN — SODIUM CHLORIDE: 9 INJECTION, SOLUTION INTRAVENOUS at 01:29

## 2018-10-24 RX ADMIN — OCTREOTIDE ACETATE 50 MCG: 50 INJECTION, SOLUTION INTRAVENOUS; SUBCUTANEOUS at 05:55

## 2018-10-24 RX ADMIN — PROPOFOL 200 MG: 10 INJECTION, EMULSION INTRAVENOUS at 12:58

## 2018-10-24 RX ADMIN — ATORVASTATIN CALCIUM 40 MG: 40 TABLET, FILM COATED ORAL at 20:36

## 2018-10-24 RX ADMIN — SODIUM CHLORIDE: 9 INJECTION, SOLUTION INTRAVENOUS at 12:57

## 2018-10-24 RX ADMIN — LIDOCAINE HYDROCHLORIDE 50 MG: 20 INJECTION, SOLUTION INFILTRATION; PERINEURAL at 12:58

## 2018-10-24 RX ADMIN — OCTREOTIDE ACETATE 50 MCG/HR: 500 INJECTION, SOLUTION INTRAVENOUS; SUBCUTANEOUS at 06:08

## 2018-10-24 RX ADMIN — SODIUM CHLORIDE 8 MG/HR: 9 INJECTION, SOLUTION INTRAVENOUS at 06:25

## 2018-10-24 RX ADMIN — METAXALONE 400 MG: 800 TABLET ORAL at 20:37

## 2018-10-24 RX ADMIN — SODIUM CHLORIDE 250 ML: 9 INJECTION, SOLUTION INTRAVENOUS at 11:50

## 2018-10-24 RX ADMIN — Medication 10 ML: at 20:42

## 2018-10-24 RX ADMIN — PANTOPRAZOLE SODIUM 40 MG: 40 INJECTION, POWDER, FOR SOLUTION INTRAVENOUS at 20:42

## 2018-10-24 RX ADMIN — LISINOPRIL 20 MG: 20 TABLET ORAL at 08:08

## 2018-10-24 ASSESSMENT — PAIN SCALES - GENERAL
PAINLEVEL_OUTOF10: 0

## 2018-10-24 ASSESSMENT — PAIN - FUNCTIONAL ASSESSMENT: PAIN_FUNCTIONAL_ASSESSMENT: 0-10

## 2018-10-24 NOTE — PLAN OF CARE
Problem: Falls - Risk of:  Goal: Will remain free from falls  Will remain free from falls   Outcome: Ongoing  Assessment & interventions provided throughout shift. Bed locked & in low position, call light in reach, side-rails up x2, non-slip socks on when ambulating, reminded patient to use call light to call for assistance. Wife is staying in his room overnight. Problem: Bowel Function - Altered:  Goal: Bowel elimination is within specified parameters  Bowel elimination is within specified parameters   Outcome: Ongoing  He has had 2 BMs throughout the day and they are now dark black stools. Problem: Nausea/Vomiting:  Goal: Able to drink  Able to drink   Outcome: Ongoing  He is able to drink without any issues. Goal: Able to eat  Able to eat   Outcome: Ongoing  He is able to eat without any issues. Problem: Discharge Planning:  Goal: Discharged to appropriate level of care  Discharged to appropriate level of care   Outcome: Ongoing  He is from home with his wife. Problem: Fluid Volume - Imbalance:  Goal: Will show no signs and symptoms of excessive bleeding  Will show no signs and symptoms of excessive bleeding   Outcome: Ongoing  H&H remains stable. hgb went from 8.2 to 8.1 approximately 5 hours later. Goal: Absence of imbalanced fluid volume signs and symptoms  Absence of imbalanced fluid volume signs and symptoms   Outcome: Ongoing   No edema noted. He is drinking fluids fine and has .9 running at 100 mls/hour. Problem: Pain:  Goal: Patient's pain/discomfort is manageable  Patient's pain/discomfort is manageable  Outcome: Ongoing  Patient denies pain so far this shift. Reminded patient to report any pain, pressure, or shortness of breath to the nurse. Will continue to monitor. Problem: Skin Integrity:  Goal: Skin integrity will stabilize  Skin integrity will stabilize  Outcome: Ongoing  No wounds or skin issues noted. He turns & ambulates himself with ease.     Comments: Care plan

## 2018-10-24 NOTE — PRE SEDATION
[facility]    Pre-Operative History and Physical: Flexible sigmoidoscopy    Patient: Madhav Arnold. : 1965      History Obtained From:  patient, electronic medical record    HISTORY OF PRESENT ILLNESS:    The patient is a 45 y.o. male who present for flexible sigmoidoscopy with rectal bleeding    Past Medical History:        Diagnosis Date    Anxiety     Arthritis     Hyperlipemia     Hypertension      Past Surgical History:        Procedure Laterality Date    BACK SURGERY  2016    L-5 and bulging disc repair 2 rods and 4 screws with fusion at [place] with Dr. Meghan Powell      COLONOSCOPY  10/13/2017    Dr Joseph Dobson      Dr Joseph Dobson      COLONOSCOPY Left 10/21/2018    COLONOSCOPY CONTROL HEMORRHAGE performed by Chela Zurita MD at [address] [place] [address]  2018    banding-Dr Naima Barksdale KNEE ARTHROSCOPY Bilateral     mensicus repair-Dr Maxwell Grande UPPER GASTROINTESTINAL ENDOSCOPY      Dr Joel Washington  10/21/2018    EGD ESOPHAGOGASTRODUODENOSCOPY performed by Chela Zurita MD at [facility]     Medications Prior to Admission:   No current facility-administered medications on file prior to encounter. Current Outpatient Prescriptions on File Prior to Encounter   Medication Sig Dispense Refill    lisinopril (PRINIVIL;ZESTRIL) 20 MG tablet Take 1 tablet by mouth daily 90 tablet 3    atorvastatin (LIPITOR) 40 MG tablet Take 1 tablet by mouth nightly 90 tablet 3    ALPRAZolam (XANAX) 1 MG tablet Take 1 tablet by mouth 3  times daily as needed for  anxiety 270 tablet 1    omeprazole (PRILOSEC) 20 MG capsule Take 20 mg by mouth daily.       B Complex Vitamins (VITAMIN B COMPLEX PO) Take 1 tablet by mouth daily One daily       hydrOXYzine (ATARAX) 25 MG tablet Take 1 tablet by mouth 3 times daily as needed for Itching 120 tablet 2    Multiple Vitamins-Minerals (MULTIVITAMIN & MINERAL PO) Take 1 tablet by mouth daily One daily--one bottle refill for one year           Allergies:  Patient has no known allergies. Social History:   TOBACCO:   reports that he quit smoking about 12 years ago. He has never used smokeless tobacco.  ETOH:   reports that he drinks alcohol. DRUGS:   reports that he does not use drugs. Family History:   Family History   Problem Relation Age of Onset    Cancer Mother         Intestinal Cancer    Cancer Father         Liver Cancer       PHYSICAL EXAM:      BP (!) 157/83   Pulse 77   Temp 98.4 °F (36.9 °C) (Oral)   Resp 16   Ht 6' 4\" (1.93 m)   Wt 218 lb (98.9 kg)   SpO2 98%   BMI 26.54 kg/m²  I        Heart:  regular rate and rhythm    Lungs:  No increased work of breathing, good air exchange, clear to auscultation bilaterally, no crackles or wheezing    Abdomen:  No scars, normal bowel sounds, soft, non-distended, non-tender, no masses palpated, no hepatosplenomegaly    ASA Grade:  ASA 2 - Patient with mild systemic disease with no functional limitations    Mallampati Classification:  Class II      ASSESSMENT AND PLAN:    1. Patient is a 45 y.o. male here for a flexible sigmoidoscopy with deep sedation    2. Procedure options, risks and benefits reviewed with patient. We specifically discussed that risks include but are not limited to infection, bleeding, perforation, death, and missed lesions. Patient expresses understanding.         Electronically signed by Scotty Amanda MD on 10/24/2018 at 9:37 AM    Gastroenterology - [facility]

## 2018-10-24 NOTE — ANESTHESIA PRE PROCEDURE
PRN Yosvany Resendiz MD   50 mcg at 10/21/18 2013    midazolam (VERSED) injection    PRN Yosvany Resendiz MD   3 mg at 10/21/18 2004       Allergies:  No Known Allergies    Problem List:    Patient Active Problem List   Diagnosis Code    HTN (hypertension) I10    Hypercholesteremia E78.00    Insomnia G47.00    Spine pain,  L-S- chronic pain M54.9    Varicose vein, chronic I83.90    GERD (gastroesophageal reflux disease) K21.9    ED (erectile dysfunction) N52.9    Allergic rhinitis J30.9    Itchy skin L29.9    Medication monitoring encounter Z51.81    Chronic cervical pain with radicular shoulder pain. M54.2, G89.29    Chronic abdominal pain, reflux/heartburn. R10.9, G89.29    Bilateral knee pain, rt>lt. M25.561, M25.562    Lumbar disc disease with radiculopathy, L5-S1 with sciatica M51.16    Chronic anxiety F41.9    Hx of lumbar discectomy, L5-S1, with one level fusion, 11/18/16.  B12.080    Primary osteoarthritis of right knee, bone on bone M17.11    Pain of right hip joint M25.551    Ventral hernia without obstruction or gangrene K43.9    Rectal bleeding K62.5    Acute GI bleeding K92.2    Hypovolemic shock (HCC) R57.1    Hemorrhagic shock (HCC) R57.8       Past Medical History:        Diagnosis Date    Anxiety     Arthritis     Hyperlipemia     Hypertension        Past Surgical History:        Procedure Laterality Date    BACK SURGERY  11/18/2016    L-5 and bulging disc repair 2 rods and 4 screws with fusion at [facility] with Dr. Sherron Amos  2002    COLONOSCOPY  10/13/2017    Dr Alen Cardozo  2014    Dr Alen Cardozo  2013    COLONOSCOPY Left 10/21/2018    COLONOSCOPY CONTROL HEMORRHAGE performed by Yosvany Resendiz MD at [facility]  09/28/2018    banding-Dr Dye [address] ARTHROSCOPY Bilateral     mensicus repair-Dr Analisa Ruiz UPPER GASTROINTESTINAL ENDOSCOPY  2013    Dr Najma Garcia

## 2018-10-25 VITALS
SYSTOLIC BLOOD PRESSURE: 163 MMHG | WEIGHT: 213.8 LBS | DIASTOLIC BLOOD PRESSURE: 87 MMHG | HEIGHT: 76 IN | TEMPERATURE: 97.7 F | BODY MASS INDEX: 26.04 KG/M2 | RESPIRATION RATE: 16 BRPM | OXYGEN SATURATION: 98 % | HEART RATE: 108 BPM

## 2018-10-25 LAB
ANION GAP SERPL CALCULATED.3IONS-SCNC: 12 MEQ/L (ref 8–16)
BUN BLDV-MCNC: 7 MG/DL (ref 7–22)
CALCIUM SERPL-MCNC: 8.8 MG/DL (ref 8.5–10.5)
CHLORIDE BLD-SCNC: 104 MEQ/L (ref 98–111)
CO2: 27 MEQ/L (ref 23–33)
CREAT SERPL-MCNC: 0.6 MG/DL (ref 0.4–1.2)
GFR SERPL CREATININE-BSD FRML MDRD: > 90 ML/MIN/1.73M2
GLUCOSE BLD-MCNC: 104 MG/DL (ref 70–108)
HCT VFR BLD CALC: 29.3 % (ref 42–52)
HEMOGLOBIN: 10.1 GM/DL (ref 14–18)
MAGNESIUM: 2 MG/DL (ref 1.6–2.4)
PHOSPHORUS: 4.5 MG/DL (ref 2.4–4.7)
POTASSIUM SERPL-SCNC: 4 MEQ/L (ref 3.5–5.2)
SODIUM BLD-SCNC: 143 MEQ/L (ref 135–145)

## 2018-10-25 PROCEDURE — 2709999900 HC NON-CHARGEABLE SUPPLY

## 2018-10-25 PROCEDURE — 2580000003 HC RX 258: Performed by: INTERNAL MEDICINE

## 2018-10-25 PROCEDURE — 6360000002 HC RX W HCPCS: Performed by: INTERNAL MEDICINE

## 2018-10-25 PROCEDURE — 6370000000 HC RX 637 (ALT 250 FOR IP): Performed by: INTERNAL MEDICINE

## 2018-10-25 PROCEDURE — 85018 HEMOGLOBIN: CPT

## 2018-10-25 PROCEDURE — 85014 HEMATOCRIT: CPT

## 2018-10-25 PROCEDURE — 99239 HOSP IP/OBS DSCHRG MGMT >30: CPT | Performed by: INTERNAL MEDICINE

## 2018-10-25 PROCEDURE — C9113 INJ PANTOPRAZOLE SODIUM, VIA: HCPCS | Performed by: INTERNAL MEDICINE

## 2018-10-25 PROCEDURE — 84100 ASSAY OF PHOSPHORUS: CPT

## 2018-10-25 PROCEDURE — 80048 BASIC METABOLIC PNL TOTAL CA: CPT

## 2018-10-25 PROCEDURE — 83735 ASSAY OF MAGNESIUM: CPT

## 2018-10-25 PROCEDURE — 36415 COLL VENOUS BLD VENIPUNCTURE: CPT

## 2018-10-25 RX ORDER — POLYETHYLENE GLYCOL 3350 17 G/17G
17 POWDER, FOR SOLUTION ORAL DAILY
Qty: 527 G | Refills: 1 | Status: SHIPPED | OUTPATIENT
Start: 2018-10-25 | End: 2018-11-24

## 2018-10-25 RX ORDER — OMEPRAZOLE 40 MG/1
40 CAPSULE, DELAYED RELEASE ORAL DAILY
Qty: 30 CAPSULE | Refills: 3 | Status: SHIPPED | OUTPATIENT
Start: 2018-10-25 | End: 2018-10-25

## 2018-10-25 RX ORDER — OMEPRAZOLE 40 MG/1
40 CAPSULE, DELAYED RELEASE ORAL DAILY
Qty: 30 CAPSULE | Refills: 3 | Status: SHIPPED | OUTPATIENT
Start: 2018-10-25 | End: 2021-03-29

## 2018-10-25 RX ORDER — 0.9 % SODIUM CHLORIDE 0.9 %
500 INTRAVENOUS SOLUTION INTRAVENOUS ONCE
Status: COMPLETED | OUTPATIENT
Start: 2018-10-25 | End: 2018-10-25

## 2018-10-25 RX ORDER — PANTOPRAZOLE SODIUM 40 MG/1
40 TABLET, DELAYED RELEASE ORAL
Status: DISCONTINUED | OUTPATIENT
Start: 2018-10-26 | End: 2018-10-25 | Stop reason: HOSPADM

## 2018-10-25 RX ADMIN — Medication 10 ML: at 12:03

## 2018-10-25 RX ADMIN — SODIUM CHLORIDE 500 ML: 9 INJECTION, SOLUTION INTRAVENOUS at 11:52

## 2018-10-25 RX ADMIN — Medication 10 ML: at 08:50

## 2018-10-25 RX ADMIN — LISINOPRIL 20 MG: 20 TABLET ORAL at 08:50

## 2018-10-25 RX ADMIN — POLYETHYLENE GLYCOL (3350) 17 G: 17 POWDER, FOR SOLUTION ORAL at 10:36

## 2018-10-25 RX ADMIN — PANTOPRAZOLE SODIUM 40 MG: 40 INJECTION, POWDER, FOR SOLUTION INTRAVENOUS at 08:50

## 2018-10-25 ASSESSMENT — PAIN SCALES - GENERAL
PAINLEVEL_OUTOF10: 0

## 2018-10-25 NOTE — PROGRESS NOTES
0800 T 98.9 oral, , R 12, /78, O2 100%. Pt denies pain at this time. Skin is warm, pale, and dry. Heart rate is normal. Lung sounds are clear. Bowel sounds active in all 4 quadrants. Pedal push & pull is strong. Hand grasp strong and equal bilaterally. No edema present.  Electronically signed by Vita Joe on 10/25/2018 at 10:00 AM

## 2018-10-25 NOTE — DISCHARGE SUMMARY
Hospital Medicine Discharge Summary      Patient Identification:   Claude Fierro : 1965  MRN: [id number]   Account: [de-identified]      Patient's PCP: Lesli Brandt MD    Admit Date: 10/21/2018     Discharge Date:   10/25/18    Admitting Physician: Marry Trevino MD     Discharge Physician: Darien Bob MD     Discharge Diagnoses: Acute GI bleed     Active Hospital Problems    Diagnosis Date Noted    Acute GI bleeding [K92.2] 10/21/2018     Priority: High    Hypovolemic shock ([address] [R57.1] 10/23/2018     Priority: Medium    Hemorrhagic shock ([address] [R57.8] 10/23/2018    Rectal bleeding [K62.5] 10/21/2018       The patient was seen and examined on day of discharge and this discharge summary is in conjunction with any daily progress note from day of discharge. Hospital Course:   Claude Fierro is a 45 y.o. male admitted to [address] [place] [address] on 10/21/2018 for hematochezia. Found to have ulcer at base of recently banded hemorrhoid with visible vessel. EGD showed no source of bleeding however pt did have blood as far back as the ileum. Pt had recurrence of hemoglobin drop couple days later on 10/24. Repeat colonoscopy on 10/24 showed normal mucosa with mild diverticulosis, healing and clean rectal ulcer with no stigmata of recent bleeding. Pt received another unit of pRBCs. Hb has remained stable for 24 hours. Heart Rate continues to be elevated in the 110s. No BRBPR, no melena. Pt adamant on being discharged even though explaining to him that staying one more day is likely more beneficial. Patient and wife continue to argue and wanting to leave. Pt to follow-up with GI and PCP outpatient. No NSAIDs. Pt to take daily Miralax for soft bowel movements.        Exam:     Vitals:  Vitals:    10/25/18 0000 10/25/18 0436 10/25/18 0800 10/25/18 1130   BP:  (!) 144/80 137/78 (!) 163/87   Pulse: 75 98 102 108   Resp:  18 12 16   Temp:  97.7 °F (36.5 °C) 98.9 °F (37.2 °C) 97.7 °F (36.5

## 2018-10-25 NOTE — PLAN OF CARE
Problem: Falls - Risk of:  Goal: Will remain free from falls  Will remain free from falls   Outcome: Ongoing  Assessment & interventions provided throughout shift. Bed locked & in low position, call light in reach, side-rails up x2, non-slip socks on when ambulating, reminded patient to use call light to call for assistance. He ambulates self with ease. Problem: Bowel Function - Altered:  Goal: Bowel elimination is within specified parameters  Bowel elimination is within specified parameters   Outcome: Ongoing  No blood stools noted today. Repeat colonscopy was negative for active bleed. Problem: Nausea/Vomiting:  Goal: Able to drink  Able to drink   Outcome: Met This Shift  Patient has drank several Gatorades earlier. Goal: Able to eat  Able to eat   Outcome: Ongoing  He denies any problems eating. Problem: Discharge Planning:  Goal: Discharged to appropriate level of care  Discharged to appropriate level of care   Outcome: Ongoing  He is from home with his wife. He is active and still works. Problem: Fluid Volume - Imbalance:  Goal: Will show no signs and symptoms of excessive bleeding  Will show no signs and symptoms of excessive bleeding   Outcome: Ongoing  No signs of bleeding so far this shift. Goal: Absence of imbalanced fluid volume signs and symptoms  Absence of imbalanced fluid volume signs and symptoms   Outcome: Ongoing  No edema, doesn't appear dehydrated. Drinking fluids. Problem: Pain:  Goal: Patient's pain/discomfort is manageable  Patient's pain/discomfort is manageable   Outcome: Ongoing  Patient denies pain so far this shift. Reminded patient to report any pain, pressure, or shortness of breath to the nurse. Will continue to monitor. Problem: Skin Integrity:  Goal: Skin integrity will stabilize  Skin integrity will stabilize   Outcome: Ongoing  No wounds. Comments: Care plan reviewed with patient and wife.   Patient verbalizes understanding of the care plan and contributed to goal setting.

## 2018-10-26 ENCOUNTER — CARE COORDINATION (OUTPATIENT)
Dept: CASE MANAGEMENT | Age: 53
End: 2018-10-26

## 2018-10-26 DIAGNOSIS — K62.5 RECTAL BLEEDING: Primary | ICD-10-CM

## 2018-10-27 ENCOUNTER — HOSPITAL ENCOUNTER (OUTPATIENT)
Age: 53
Discharge: HOME | End: 2018-10-27
Payer: COMMERCIAL

## 2018-10-27 LAB
ERYTHROCYTE [DISTWIDTH] IN BLOOD BY AUTOMATED COUNT: 13.8 % (ref 11.5–14.5)
ERYTHROCYTE [DISTWIDTH] IN BLOOD BY AUTOMATED COUNT: 45 FL (ref 35–45)
HCT VFR BLD CALC: 32.5 % (ref 42–52)
HEMOGLOBIN: 11 GM/DL (ref 14–18)
MCH RBC QN AUTO: 32 PG (ref 26–33)
MCHC RBC AUTO-ENTMCNC: 33.8 GM/DL (ref 32.2–35.5)
MCV RBC AUTO: 94.5 FL (ref 80–94)
PLATELET # BLD: 263 THOU/MM3 (ref 130–400)
PMV BLD AUTO: 9.3 FL (ref 9.4–12.4)
RBC # BLD: 3.44 MILL/MM3 (ref 4.7–6.1)
WBC # BLD: 5.3 THOU/MM3 (ref 4.8–10.8)

## 2018-10-27 PROCEDURE — 36415 COLL VENOUS BLD VENIPUNCTURE: CPT

## 2018-10-27 PROCEDURE — 85027 COMPLETE CBC AUTOMATED: CPT

## 2018-10-29 ENCOUNTER — TELEPHONE (OUTPATIENT)
Dept: FAMILY MEDICINE CLINIC | Age: 53
End: 2018-10-29

## 2018-10-29 ENCOUNTER — CARE COORDINATION (OUTPATIENT)
Dept: CASE MANAGEMENT | Age: 53
End: 2018-10-29

## 2018-10-29 NOTE — TELEPHONE ENCOUNTER
Pt called office stating he was in the hospital recently and he was suppose to have a repeat CBC drawn. he had this done on 10/27/18. He is asking for those results. Please advise.

## 2018-11-02 ENCOUNTER — CARE COORDINATION (OUTPATIENT)
Dept: CARE COORDINATION | Age: 53
End: 2018-11-02

## 2018-11-06 ENCOUNTER — OFFICE VISIT (OUTPATIENT)
Dept: FAMILY MEDICINE CLINIC | Age: 53
End: 2018-11-06
Payer: COMMERCIAL

## 2018-11-06 VITALS
DIASTOLIC BLOOD PRESSURE: 64 MMHG | HEART RATE: 104 BPM | WEIGHT: 176.4 LBS | BODY MASS INDEX: 23.38 KG/M2 | SYSTOLIC BLOOD PRESSURE: 118 MMHG | OXYGEN SATURATION: 93 % | HEIGHT: 73 IN | RESPIRATION RATE: 24 BRPM

## 2018-11-06 DIAGNOSIS — M17.11 PRIMARY OSTEOARTHRITIS OF RIGHT KNEE: ICD-10-CM

## 2018-11-06 DIAGNOSIS — Z98.890 HX OF LUMBAR DISCECTOMY: ICD-10-CM

## 2018-11-06 DIAGNOSIS — Z51.81 MEDICATION MONITORING ENCOUNTER: ICD-10-CM

## 2018-11-06 DIAGNOSIS — K92.2 ACUTE GI BLEEDING: ICD-10-CM

## 2018-11-06 DIAGNOSIS — M51.16 LUMBAR DISC DISEASE WITH RADICULOPATHY: ICD-10-CM

## 2018-11-06 DIAGNOSIS — Z09 HOSPITAL DISCHARGE FOLLOW-UP: Primary | ICD-10-CM

## 2018-11-06 DIAGNOSIS — I10 ESSENTIAL HYPERTENSION: ICD-10-CM

## 2018-11-06 DIAGNOSIS — K62.5 RECTAL BLEEDING: ICD-10-CM

## 2018-11-06 PROCEDURE — 99214 OFFICE O/P EST MOD 30 MIN: CPT | Performed by: FAMILY MEDICINE

## 2018-11-06 RX ORDER — HYDROCODONE BITARTRATE AND ACETAMINOPHEN 7.5; 325 MG/1; MG/1
1 TABLET ORAL EVERY 8 HOURS PRN
Qty: 90 TABLET | Refills: 0 | Status: SHIPPED | OUTPATIENT
Start: 2018-11-06 | End: 2018-12-10 | Stop reason: SDUPTHER

## 2018-11-06 ASSESSMENT — ENCOUNTER SYMPTOMS
RESPIRATORY NEGATIVE: 1
ALLERGIC/IMMUNOLOGIC NEGATIVE: 1
ABDOMINAL PAIN: 0
EYES NEGATIVE: 1
RECTAL PAIN: 1
ANAL BLEEDING: 1

## 2018-11-06 ASSESSMENT — PATIENT HEALTH QUESTIONNAIRE - PHQ9
SUM OF ALL RESPONSES TO PHQ9 QUESTIONS 1 & 2: 0
SUM OF ALL RESPONSES TO PHQ QUESTIONS 1-9: 0
2. FEELING DOWN, DEPRESSED OR HOPELESS: 0
SUM OF ALL RESPONSES TO PHQ QUESTIONS 1-9: 0
1. LITTLE INTEREST OR PLEASURE IN DOING THINGS: 0

## 2018-11-06 NOTE — PROGRESS NOTES
Psychiatric/Behavioral: Negative. All other systems reviewed and are negative. Objective:   Physical Exam   Constitutional: He is oriented to person, place, and time. He appears well-developed and well-nourished. HENT:   Right Ear: External ear normal.   Left Ear: External ear normal.   Nose: Nose normal.   Mouth/Throat: Oropharynx is clear and moist.   Eyes: Conjunctivae are normal.   Neck: Neck supple. Cardiovascular: Normal rate, regular rhythm and intact distal pulses. Exam reveals no gallop. No murmur heard. Pulmonary/Chest: Effort normal and breath sounds normal. He has no wheezes. He has no rales. Abdominal: Soft. Bowel sounds are normal. There is no tenderness. There is no rebound. Musculoskeletal: He exhibits no edema. Lymphadenopathy:     He has no cervical adenopathy. Neurological: He is alert and oriented to person, place, and time. Skin: Skin is warm and dry. Psychiatric: He has a normal mood and affect. Assessment:       Diagnosis Orders   1. Hospital discharge follow-up     2. Essential hypertension     3. Rectal bleeding     4. Acute GI bleeding     5. Lumbar disc disease with radiculopathy, L5-S1 with sciatica  HYDROcodone-acetaminophen (NORCO) 7.5-325 MG per tablet   6. Hx of lumbar discectomy, L5-S1, with one level fusion, 11/18/16. HYDROcodone-acetaminophen (NORCO) 7.5-325 MG per tablet   7. Primary osteoarthritis of right knee, bone on bone  HYDROcodone-acetaminophen (NORCO) 7.5-325 MG per tablet   8. Medication monitoring encounter             Plan:      No orders of the defined types were placed in this encounter. There are no discontinued medications. Current Outpatient Prescriptions   Medication Sig Dispense Refill    HYDROcodone-acetaminophen (NORCO) 7.5-325 MG per tablet Take 1 tablet by mouth every 8 hours as needed for Pain for up to 30 days. Intended supply: 30 days.  90 tablet 0    polyethylene glycol (GLYCOLAX) packet Take 17 g by mouth

## 2018-12-06 PROBLEM — Z09 HOSPITAL DISCHARGE FOLLOW-UP: Status: RESOLVED | Noted: 2018-11-06 | Resolved: 2018-12-06

## 2019-01-16 ENCOUNTER — PATIENT MESSAGE (OUTPATIENT)
Dept: FAMILY MEDICINE CLINIC | Age: 54
End: 2019-01-16

## 2019-01-16 DIAGNOSIS — M51.16 LUMBAR DISC DISEASE WITH RADICULOPATHY: ICD-10-CM

## 2019-01-16 DIAGNOSIS — M17.11 PRIMARY OSTEOARTHRITIS OF RIGHT KNEE: ICD-10-CM

## 2019-01-16 DIAGNOSIS — Z98.890 HX OF LUMBAR DISCECTOMY: ICD-10-CM

## 2019-01-17 RX ORDER — HYDROCODONE BITARTRATE AND ACETAMINOPHEN 7.5; 325 MG/1; MG/1
1 TABLET ORAL EVERY 8 HOURS PRN
Qty: 90 TABLET | Refills: 0 | Status: SHIPPED | OUTPATIENT
Start: 2019-01-17 | End: 2019-02-18 | Stop reason: SDUPTHER

## 2019-02-04 ENCOUNTER — OFFICE VISIT (OUTPATIENT)
Dept: FAMILY MEDICINE CLINIC | Age: 54
End: 2019-02-04
Payer: COMMERCIAL

## 2019-02-04 VITALS
SYSTOLIC BLOOD PRESSURE: 136 MMHG | RESPIRATION RATE: 16 BRPM | DIASTOLIC BLOOD PRESSURE: 74 MMHG | HEART RATE: 80 BPM | WEIGHT: 225.5 LBS | BODY MASS INDEX: 28.04 KG/M2 | HEIGHT: 75 IN

## 2019-02-04 DIAGNOSIS — F11.281: ICD-10-CM

## 2019-02-04 DIAGNOSIS — I10 ESSENTIAL HYPERTENSION: ICD-10-CM

## 2019-02-04 DIAGNOSIS — N48.83 ACQUIRED BURIED PENIS: ICD-10-CM

## 2019-02-04 DIAGNOSIS — M17.11 PRIMARY OSTEOARTHRITIS OF RIGHT KNEE: Primary | ICD-10-CM

## 2019-02-04 PROCEDURE — 99214 OFFICE O/P EST MOD 30 MIN: CPT | Performed by: FAMILY MEDICINE

## 2019-02-04 ASSESSMENT — ENCOUNTER SYMPTOMS
RESPIRATORY NEGATIVE: 1
GASTROINTESTINAL NEGATIVE: 1

## 2019-02-08 ENCOUNTER — HOSPITAL ENCOUNTER (OUTPATIENT)
Age: 54
Discharge: HOME | End: 2019-02-08
Payer: COMMERCIAL

## 2019-02-08 DIAGNOSIS — F11.281: ICD-10-CM

## 2019-02-08 PROCEDURE — 36415 COLL VENOUS BLD VENIPUNCTURE: CPT

## 2019-02-08 PROCEDURE — 84403 ASSAY OF TOTAL TESTOSTERONE: CPT

## 2019-02-08 PROCEDURE — 84270 ASSAY OF SEX HORMONE GLOBUL: CPT

## 2019-02-12 LAB — TESTOSTERONE FREE: NORMAL

## 2019-02-18 ENCOUNTER — PATIENT MESSAGE (OUTPATIENT)
Dept: FAMILY MEDICINE CLINIC | Age: 54
End: 2019-02-18

## 2019-02-18 DIAGNOSIS — Z98.890 HX OF LUMBAR DISCECTOMY: ICD-10-CM

## 2019-02-18 DIAGNOSIS — M51.16 LUMBAR DISC DISEASE WITH RADICULOPATHY: ICD-10-CM

## 2019-02-18 DIAGNOSIS — M17.11 PRIMARY OSTEOARTHRITIS OF RIGHT KNEE: ICD-10-CM

## 2019-02-18 RX ORDER — HYDROCODONE BITARTRATE AND ACETAMINOPHEN 7.5; 325 MG/1; MG/1
1 TABLET ORAL EVERY 8 HOURS PRN
Qty: 90 TABLET | Refills: 0 | Status: SHIPPED | OUTPATIENT
Start: 2019-02-18 | End: 2019-03-20 | Stop reason: SDUPTHER

## 2019-03-04 ENCOUNTER — OFFICE VISIT (OUTPATIENT)
Dept: UROLOGY | Age: 54
End: 2019-03-04
Payer: COMMERCIAL

## 2019-03-04 VITALS
SYSTOLIC BLOOD PRESSURE: 132 MMHG | WEIGHT: 228 LBS | HEIGHT: 76 IN | BODY MASS INDEX: 27.76 KG/M2 | DIASTOLIC BLOOD PRESSURE: 84 MMHG

## 2019-03-04 DIAGNOSIS — N52.9 ERECTILE DYSFUNCTION, UNSPECIFIED ERECTILE DYSFUNCTION TYPE: ICD-10-CM

## 2019-03-04 DIAGNOSIS — N52.2 DRUG-INDUCED ERECTILE DYSFUNCTION: Primary | ICD-10-CM

## 2019-03-04 DIAGNOSIS — Z12.5 PROSTATE CANCER SCREENING: ICD-10-CM

## 2019-03-04 DIAGNOSIS — R68.82 LOW LIBIDO: ICD-10-CM

## 2019-03-04 LAB
BILIRUBIN URINE: NEGATIVE
BLOOD URINE, POC: NEGATIVE
CHARACTER, URINE: CLEAR
COLOR, URINE: YELLOW
GLUCOSE URINE: NEGATIVE MG/DL
KETONES, URINE: NEGATIVE
LEUKOCYTE CLUMPS, URINE: NEGATIVE
NITRITE, URINE: NEGATIVE
PH, URINE: 6 (ref 5–9)
PROTEIN, URINE: NEGATIVE MG/DL
SPECIFIC GRAVITY, URINE: 1.01 (ref 1–1.03)
UROBILINOGEN, URINE: 0.2 EU/DL (ref 0–1)

## 2019-03-04 PROCEDURE — 99244 OFF/OP CNSLTJ NEW/EST MOD 40: CPT | Performed by: UROLOGY

## 2019-03-04 PROCEDURE — 81003 URINALYSIS AUTO W/O SCOPE: CPT | Performed by: UROLOGY

## 2019-03-04 ASSESSMENT — ENCOUNTER SYMPTOMS
DIARRHEA: 0
CHOKING: 0
EYE ITCHING: 0
NAUSEA: 0
CHEST TIGHTNESS: 0
COLOR CHANGE: 0

## 2019-03-04 NOTE — PROGRESS NOTES
Subjective:      Patient ID: Esdras Bach. 52 y.o. male 1965    Chief Complaint   Patient presents with    Advice Only    Erectile Dysfunction       Erectile Dysfunction   Pertinent negatives include no chills. Nothing aggravates the symptoms. Past treatments include nothing. The treatment provided no relief. He has had no adverse reactions caused by medications. Risk factors include tobacco use (narcotics and benzodiazepines). Other   This is a new (decreased libido) problem. The current episode started more than 1 month ago. The problem occurs constantly. The problem has been gradually worsening. Pertinent negatives include no chest pain, chills, fatigue, joint swelling, nausea or rash. Nothing aggravates the symptoms. He has tried nothing for the symptoms. The treatment provided no relief.        Past Medical History:   Diagnosis Date    Anxiety     Arthritis     Hyperlipemia     Hypertension        Social History     Socioeconomic History    Marital status:      Spouse name: Not on file    Number of children: Not on file    Years of education: Not on file    Highest education level: Not on file   Occupational History    Not on file   Social Needs    Financial resource strain: Not on file    Food insecurity:     Worry: Not on file     Inability: Not on file    Transportation needs:     Medical: Not on file     Non-medical: Not on file   Tobacco Use    Smoking status: Former Smoker     Packs/day: 1.50     Years: 25.00     Pack years: 37.50     Types: Cigarettes     Last attempt to quit: 2006     Years since quittin.1    Smokeless tobacco: Never Used   Substance and Sexual Activity    Alcohol use: Yes     Comment: occasional    Drug use: No    Sexual activity: Not on file   Lifestyle    Physical activity:     Days per week: Not on file     Minutes per session: Not on file    Stress: Not on file   Relationships    Social connections:     Talks on phone: Not on file     Gets together: Not on file     Attends Oriental orthodox service: Not on file     Active member of club or organization: Not on file     Attends meetings of clubs or organizations: Not on file     Relationship status: Not on file    Intimate partner violence:     Fear of current or ex partner: Not on file     Emotionally abused: Not on file     Physically abused: Not on file     Forced sexual activity: Not on file   Other Topics Concern    Not on file   Social History Narrative    Not on file       Family History   Problem Relation Age of Onset    Cancer Mother         Intestinal Cancer    Cancer Father         Liver Cancer       Past Surgical History:   Procedure Laterality Date    BACK SURGERY  11/18/2016    L-5 and bulging disc repair 2 rods and 4 screws with fusion at [facility] with Dr. Ricardo Urbina  2002    COLONOSCOPY  10/13/2017    Dr Derek Torres  2014    Dr Derek Torres  2013    COLONOSCOPY Left 10/21/2018    COLONOSCOPY CONTROL HEMORRHAGE performed by Steff Maddox MD at [address] [place] [address]  09/28/2018    banding-Dr Ilene Mathur KNEE ARTHROSCOPY Bilateral     mensicus repair-Dr Corinne Solis    LA OFFICE/OUTPT VISIT,PROCEDURE ONLY N/A 10/24/2018    SIGMOIDOSCOPY BIOPSY FLEXIBLE performed by Caro Hernandez MD at [address] [facility]  2013    Dr Enma Goldberg  10/21/2018    EGD ESOPHAGOGASTRODUODENOSCOPY performed by Steff Maddox MD at [facility]       No Known Allergies      Current Outpatient Medications:     HYDROcodone-acetaminophen (NORCO) 7.5-325 MG per tablet, Take 1 tablet by mouth every 8 hours as needed for Pain for up to 30 days. Intended supply: 30 days. , Disp: 90 tablet, Rfl: 0    omeprazole (PRILOSEC) 40 MG delayed release capsule, Take 1 capsule by mouth daily, Disp: 30 capsule, Rfl: 3    metaxalone (SKELAXIN) 800 MG tablet, Take 800 mg by mouth 3 times daily as needed for Pain, Disp: , Rfl:     CALCIUM CARBONATE-VITAMIN D PO, Take 2 tablets by mouth daily Calcium carbonate 600 mg tablet (total from taking 2 tablets is 1200 mg), unsure of vitamin D dose, Disp: , Rfl:     lisinopril (PRINIVIL;ZESTRIL) 20 MG tablet, Take 1 tablet by mouth daily, Disp: 90 tablet, Rfl: 3    atorvastatin (LIPITOR) 40 MG tablet, Take 1 tablet by mouth nightly, Disp: 90 tablet, Rfl: 3    hydrOXYzine (ATARAX) 25 MG tablet, Take 1 tablet by mouth 3 times daily as needed for Itching, Disp: 120 tablet, Rfl: 2    ALPRAZolam (XANAX) 1 MG tablet, Take 1 tablet by mouth 3  times daily as needed for  anxiety, Disp: 270 tablet, Rfl: 1    B Complex Vitamins (VITAMIN B COMPLEX PO), Take 1 tablet by mouth daily One daily , Disp: , Rfl:     Multiple Vitamins-Minerals (MULTIVITAMIN & MINERAL PO), Take 1 tablet by mouth daily One daily--one bottle refill for one year , Disp: , Rfl:     Review of Systems   Constitutional: Negative for chills and fatigue. HENT: Negative for ear discharge and ear pain. Eyes: Negative for itching. Respiratory: Negative for choking and chest tightness. Cardiovascular: Negative for chest pain and palpitations. Gastrointestinal: Negative for diarrhea and nausea. Endocrine: Negative for cold intolerance and heat intolerance. Genitourinary: Negative for flank pain and penile pain. Musculoskeletal: Negative for gait problem and joint swelling. Skin: Negative for color change and rash. Allergic/Immunologic: Negative for environmental allergies. Neurological: Negative for dizziness and seizures. Hematological: Does not bruise/bleed easily. /84   Ht 6' 4\" (1.93 m)   Wt 228 lb (103.4 kg)   BMI 27.75 kg/m²     Objective:   Physical Exam   Constitutional: He is oriented to person, place, and time. Vital signs are normal. He appears well-developed and well-nourished. He is cooperative. No distress. HENT:   Head: Normocephalic and atraumatic. Mouth/Throat: Oropharynx is clear and moist and mucous membranes are normal. No oropharyngeal exudate. Eyes: Pupils are equal, round, and reactive to light. EOM are normal. Right eye exhibits no discharge. Left eye exhibits no discharge. No scleral icterus. Neck: Trachea normal. No JVD present. No tracheal deviation present. Pulmonary/Chest: Effort normal. No respiratory distress. He has no wheezes. Abdominal: Soft. He exhibits no distension. There is no tenderness. There is no rebound and no CVA tenderness. Musculoskeletal: He exhibits no edema or tenderness. Lymphadenopathy:        Right: No supraclavicular adenopathy present. Left: No supraclavicular adenopathy present. Neurological: He is alert and oriented to person, place, and time. No cranial nerve deficit. Skin: Skin is warm and dry. He is not diaphoretic. Psychiatric: He has a normal mood and affect. His behavior is normal.   Nursing note and vitals reviewed. Labs    Results for POC orders placed in visit on 03/04/19   POCT Urinalysis No Micro (Auto)   Result Value Ref Range    Glucose, Ur Negative NEGATIVE mg/dl    Bilirubin Urine Negative     Ketones, Urine Negative NEGATIVE    Specific Gravity, Urine 1.015 1.002 - 1.03    Blood, UA POC Negative NEGATIVE    pH, Urine 6.00 5.0 - 9.0    Protein, Urine Negative NEGATIVE mg/dl    Urobilinogen, Urine 0.20 0.0 - 1.0 eu/dl    Nitrite, Urine Negative NEGATIVE    Leukocyte Clumps, Urine Negative NEGATIVE    Color, Urine Yellow YELLOW-STR    Character, Urine Clear [id number]       Lab Results   Component Value Date    CREATININE 0.6 10/25/2018    BUN 7 10/25/2018     10/25/2018    K 4.0 10/25/2018     10/25/2018    CO2 27 10/25/2018       Lab Results   Component Value Date    PSA 0.49 03/05/2019    PSA 0.44 05/05/2018    PSA 0.56 05/07/2016       Assessment:       Diagnosis Orders   1.  Drug-induced erectile dysfunction  POCT Urinalysis No Micro (Auto)       Mr. Michael Foster presents today in consultation for Drug-induced erectile dysfunction [N52.2]. He has been having problems with ED and decreased libido. He takes chronic narcotics and benzodiazepines and these may be leading to his problems. He is worried that his penis is shrinking as his circumcised penile skin sometimes covers his glans. We had a long discussion today and the majority of the visit was spent in discussion and counseling. Total visit time was 45 minutes. We will begin by checking testosterone and PSA. I have reviewed all notes sent along with this referral including notes from a recent visit to his primary care provider. These records demonstrated the following past medical history:  Past Medical History:   Diagnosis Date    Anxiety     Arthritis     Hyperlipemia     Hypertension           Plan: We will check testosterone and PSA and see him back in a couple weeks.

## 2019-03-05 ENCOUNTER — HOSPITAL ENCOUNTER (OUTPATIENT)
Age: 54
Discharge: HOME | End: 2019-03-05
Payer: COMMERCIAL

## 2019-03-05 DIAGNOSIS — N52.2 DRUG-INDUCED ERECTILE DYSFUNCTION: ICD-10-CM

## 2019-03-05 DIAGNOSIS — Z12.5 PROSTATE CANCER SCREENING: ICD-10-CM

## 2019-03-05 DIAGNOSIS — N52.9 ERECTILE DYSFUNCTION, UNSPECIFIED ERECTILE DYSFUNCTION TYPE: ICD-10-CM

## 2019-03-05 DIAGNOSIS — R68.82 LOW LIBIDO: ICD-10-CM

## 2019-03-05 LAB — PROSTATE SPECIFIC ANTIGEN: 0.49 NG/ML (ref 0–1)

## 2019-03-05 PROCEDURE — 84403 ASSAY OF TOTAL TESTOSTERONE: CPT

## 2019-03-05 PROCEDURE — 84270 ASSAY OF SEX HORMONE GLOBUL: CPT

## 2019-03-05 PROCEDURE — G0103 PSA SCREENING: HCPCS

## 2019-03-05 PROCEDURE — 36415 COLL VENOUS BLD VENIPUNCTURE: CPT

## 2019-03-08 LAB — TESTOSTERONE FREE: NORMAL

## 2019-03-18 ENCOUNTER — OFFICE VISIT (OUTPATIENT)
Dept: UROLOGY | Age: 54
End: 2019-03-18
Payer: COMMERCIAL

## 2019-03-18 VITALS
DIASTOLIC BLOOD PRESSURE: 68 MMHG | SYSTOLIC BLOOD PRESSURE: 138 MMHG | WEIGHT: 225.7 LBS | BODY MASS INDEX: 27.49 KG/M2 | HEIGHT: 76 IN

## 2019-03-18 DIAGNOSIS — N52.9 ERECTILE DYSFUNCTION, UNSPECIFIED ERECTILE DYSFUNCTION TYPE: Primary | ICD-10-CM

## 2019-03-18 DIAGNOSIS — R79.89 LOW TESTOSTERONE: ICD-10-CM

## 2019-03-18 PROCEDURE — 99213 OFFICE O/P EST LOW 20 MIN: CPT | Performed by: NURSE PRACTITIONER

## 2019-03-18 RX ORDER — SILDENAFIL CITRATE 20 MG/1
20 TABLET ORAL DAILY PRN
Qty: 30 TABLET | Refills: 5 | Status: SHIPPED | OUTPATIENT
Start: 2019-03-18 | End: 2021-03-29

## 2019-04-08 DIAGNOSIS — F41.9 ANXIETY: ICD-10-CM

## 2019-04-08 RX ORDER — ALPRAZOLAM 1 MG/1
1 TABLET ORAL 3 TIMES DAILY PRN
Qty: 270 TABLET | Refills: 1 | Status: SHIPPED | OUTPATIENT
Start: 2019-04-08 | End: 2020-02-17 | Stop reason: SDUPTHER

## 2019-04-08 NOTE — TELEPHONE ENCOUNTER
Daja Dale. called requesting a refill on the following medications:  Requested Prescriptions     Pending Prescriptions Disp Refills    ALPRAZolam (XANAX) 1 MG tablet 270 tablet 1     Pharmacy verified:  [email or web address]    Date of last visit: 02/04/19  Date of next visit (if applicable): Visit date not found

## 2019-04-17 DIAGNOSIS — E78.00 HYPERCHOLESTEREMIA: ICD-10-CM

## 2019-04-17 DIAGNOSIS — I10 ESSENTIAL HYPERTENSION: ICD-10-CM

## 2019-04-17 RX ORDER — LISINOPRIL 20 MG/1
20 TABLET ORAL DAILY
Qty: 90 TABLET | Refills: 3 | Status: SHIPPED | OUTPATIENT
Start: 2019-04-17 | End: 2019-06-19 | Stop reason: DRUGHIGH

## 2019-04-17 RX ORDER — ATORVASTATIN CALCIUM 40 MG/1
40 TABLET, FILM COATED ORAL NIGHTLY
Qty: 90 TABLET | Refills: 3 | Status: SHIPPED | OUTPATIENT
Start: 2019-04-17 | End: 2019-11-27 | Stop reason: SDUPTHER

## 2019-04-23 ENCOUNTER — PATIENT MESSAGE (OUTPATIENT)
Dept: FAMILY MEDICINE CLINIC | Age: 54
End: 2019-04-23

## 2019-04-23 DIAGNOSIS — Z98.890 HX OF LUMBAR DISCECTOMY: ICD-10-CM

## 2019-04-23 DIAGNOSIS — M51.16 LUMBAR DISC DISEASE WITH RADICULOPATHY: ICD-10-CM

## 2019-04-23 DIAGNOSIS — M17.11 PRIMARY OSTEOARTHRITIS OF RIGHT KNEE: ICD-10-CM

## 2019-04-23 RX ORDER — HYDROCODONE BITARTRATE AND ACETAMINOPHEN 7.5; 325 MG/1; MG/1
1 TABLET ORAL EVERY 8 HOURS PRN
Qty: 90 TABLET | Refills: 0 | Status: SHIPPED | OUTPATIENT
Start: 2019-04-23 | End: 2019-05-23 | Stop reason: SDUPTHER

## 2019-04-23 NOTE — TELEPHONE ENCOUNTER
From: Kylie Garcia. To: Wagner Baker MD  Sent: 4/23/2019 9:48 AM EDT  Subject: Prescription Question    I would like a refill for Norco 7.5 - 325 I do not see it on my list of medication. [facility].  Thank you

## 2019-05-23 DIAGNOSIS — M17.11 PRIMARY OSTEOARTHRITIS OF RIGHT KNEE: ICD-10-CM

## 2019-05-23 DIAGNOSIS — Z98.890 HX OF LUMBAR DISCECTOMY: ICD-10-CM

## 2019-05-23 DIAGNOSIS — M51.16 LUMBAR DISC DISEASE WITH RADICULOPATHY: ICD-10-CM

## 2019-05-23 RX ORDER — HYDROCODONE BITARTRATE AND ACETAMINOPHEN 7.5; 325 MG/1; MG/1
1 TABLET ORAL EVERY 8 HOURS PRN
Qty: 90 TABLET | Refills: 0 | Status: SHIPPED | OUTPATIENT
Start: 2019-05-23 | End: 2019-06-19 | Stop reason: SDUPTHER

## 2019-06-14 ENCOUNTER — HOSPITAL ENCOUNTER (OUTPATIENT)
Dept: INTERVENTIONAL RADIOLOGY/VASCULAR | Age: 54
Discharge: HOME | End: 2019-06-14
Payer: COMMERCIAL

## 2019-06-14 DIAGNOSIS — Z13.6 ENCOUNTER FOR SCREENING FOR VASCULAR DISEASE: ICD-10-CM

## 2019-06-14 PROCEDURE — 9900000021 US VASCULAR SCREENING

## 2019-06-17 ENCOUNTER — OFFICE VISIT (OUTPATIENT)
Dept: UROLOGY | Age: 54
End: 2019-06-17
Payer: COMMERCIAL

## 2019-06-17 VITALS
HEIGHT: 76 IN | DIASTOLIC BLOOD PRESSURE: 66 MMHG | BODY MASS INDEX: 27.72 KG/M2 | WEIGHT: 227.6 LBS | SYSTOLIC BLOOD PRESSURE: 148 MMHG

## 2019-06-17 DIAGNOSIS — N52.9 ERECTILE DYSFUNCTION, UNSPECIFIED ERECTILE DYSFUNCTION TYPE: Primary | ICD-10-CM

## 2019-06-17 PROCEDURE — 99213 OFFICE O/P EST LOW 20 MIN: CPT | Performed by: NURSE PRACTITIONER

## 2019-06-17 RX ORDER — TADALAFIL 20 MG/1
20 TABLET ORAL PRN
Qty: 30 TABLET | Refills: 3 | Status: SHIPPED | OUTPATIENT
Start: 2019-06-17 | End: 2020-07-22 | Stop reason: SDUPTHER

## 2019-06-17 NOTE — PROGRESS NOTES
[address]  [address] [facility]  Dept: [phone]  Loc: [phone]  Visit Date: 6/17/2019      HPI:     Beatriz Bales. f/u today for ED. He has taken Sildenafil 20 mg with no response, has not titrated up yet. No side effects reported. Has taken Cialis 20 mg, has good response with Cialis. Trend of PSA:  0.49 03/2019  He comes in today by himself. Hx is obtained from the patient and medical record. Current Outpatient Medications   Medication Sig Dispense Refill    tadalafil (CIALIS) 20 MG tablet Take 1 tablet by mouth as needed for Erectile Dysfunction 30 tablet 3    HYDROcodone-acetaminophen (NORCO) 7.5-325 MG per tablet Take 1 tablet by mouth every 8 hours as needed for Pain for up to 30 days. Fill 5/23/19. 90 tablet 0    lisinopril (PRINIVIL;ZESTRIL) 20 MG tablet TAKE 1 TABLET BY MOUTH DAILY 90 tablet 3    atorvastatin (LIPITOR) 40 MG tablet TAKE 1 TABLET BY MOUTH NIGHTLY 90 tablet 3    sildenafil (REVATIO) 20 MG tablet Take 1 tablet by mouth daily as needed (ED) 30 tablet 5    omeprazole (PRILOSEC) 40 MG delayed release capsule Take 1 capsule by mouth daily 30 capsule 3    metaxalone (SKELAXIN) 800 MG tablet Take 800 mg by mouth 3 times daily as needed for Pain      CALCIUM CARBONATE-VITAMIN D PO Take 2 tablets by mouth daily Calcium carbonate 600 mg tablet (total from taking 2 tablets is 1200 mg), unsure of vitamin D dose      B Complex Vitamins (VITAMIN B COMPLEX PO) Take 1 tablet by mouth daily One daily       Multiple Vitamins-Minerals (MULTIVITAMIN & MINERAL PO) Take 1 tablet by mouth daily One daily--one bottle refill for one year       ALPRAZolam (XANAX) 1 MG tablet Take 1 tablet by mouth 3 times daily as needed for Sleep for up to 270 days.  270 tablet 1    hydrOXYzine (ATARAX) 25 MG tablet Take 1 tablet by mouth 3 times daily as needed for Itching 120 tablet 2     No current facility-administered medications for this visit. Past Medical History  Marques Cardoza  has a past medical history of Anxiety, Arthritis, Hyperlipemia, and Hypertension. Past Surgical History  The patient  has a past surgical history that includes back surgery (11/18/2016); Knee arthroscopy (Bilateral); back surgery (2002); Colonoscopy (10/13/2017); Hemorrhoid surgery (09/28/2018); Colonoscopy (2014); Colonoscopy (2013); Upper gastrointestinal endoscopy (2013); Colonoscopy (Left, 10/21/2018); Upper gastrointestinal endoscopy (10/21/2018); and pr office/outpt visit,procedure only (N/A, 10/24/2018). Family History  This patient's family history includes Cancer in his father and mother. Social History  Marques Cardoza  reports that he quit smoking about 13 years ago. His smoking use included cigarettes. He has a 37.50 pack-year smoking history. He has never used smokeless tobacco. He reports that he drinks alcohol. He reports that he does not use drugs. Subjective:     Review of Systems  No problems with ears, nose or throat. No problems with eyes. No chest pain, shortness of breath, abdominal pain, extremity pain or weakness, and no neurological deficits. No rashes.  symptoms per HPI. The remainder of the review of symptoms is negative. Objective:     PE:   Vitals:    06/17/19 1422 06/17/19 1428   BP: (!) 166/102 (!) 148/66   Weight: 227 lb 9.6 oz (103.2 kg)    Height: 6' 4\" (1.93 m)        Constitutional: Alert and oriented times 3, no acute distress and cooperative to examination with appropriate mood and affect. HENT:   Head:        Normocephalic and atraumatic. Mouth/Throat:         Mucous membranes are normal.   Eyes:         EOM are normal. No scleral icterus. PERRLA. Neck:        Supple, symmetrical, trachea midline  Pulmonary/Chest:      Chest symmetric with normal A/P diameter, Normal respiratory rate and rhthym. No use of accessory muscles. Abdominal:         Soft. No tenderness.  Bowel

## 2019-06-19 ENCOUNTER — OFFICE VISIT (OUTPATIENT)
Dept: FAMILY MEDICINE CLINIC | Age: 54
End: 2019-06-19
Payer: COMMERCIAL

## 2019-06-19 VITALS
RESPIRATION RATE: 16 BRPM | OXYGEN SATURATION: 98 % | BODY MASS INDEX: 26.78 KG/M2 | DIASTOLIC BLOOD PRESSURE: 76 MMHG | HEART RATE: 105 BPM | WEIGHT: 220 LBS | SYSTOLIC BLOOD PRESSURE: 138 MMHG

## 2019-06-19 DIAGNOSIS — I10 ESSENTIAL HYPERTENSION: Primary | ICD-10-CM

## 2019-06-19 DIAGNOSIS — M51.16 LUMBAR DISC DISEASE WITH RADICULOPATHY: ICD-10-CM

## 2019-06-19 DIAGNOSIS — I83.813 VARICOSE VEINS OF BOTH LOWER EXTREMITIES WITH PAIN: ICD-10-CM

## 2019-06-19 DIAGNOSIS — Z98.890 HX OF LUMBAR DISCECTOMY: ICD-10-CM

## 2019-06-19 DIAGNOSIS — M17.11 PRIMARY OSTEOARTHRITIS OF RIGHT KNEE: ICD-10-CM

## 2019-06-19 PROCEDURE — 99214 OFFICE O/P EST MOD 30 MIN: CPT | Performed by: FAMILY MEDICINE

## 2019-06-19 RX ORDER — HYDROCODONE BITARTRATE AND ACETAMINOPHEN 7.5; 325 MG/1; MG/1
1 TABLET ORAL EVERY 8 HOURS PRN
Qty: 90 TABLET | Refills: 0 | Status: SHIPPED | OUTPATIENT
Start: 2019-06-19 | End: 2019-08-19 | Stop reason: DRUGHIGH

## 2019-06-19 RX ORDER — LISINOPRIL 20 MG/1
20 TABLET ORAL 2 TIMES DAILY
Qty: 180 TABLET | Refills: 3 | Status: SHIPPED
Start: 2019-06-19 | End: 2019-11-18 | Stop reason: SDUPTHER

## 2019-06-19 ASSESSMENT — PATIENT HEALTH QUESTIONNAIRE - PHQ9
SUM OF ALL RESPONSES TO PHQ9 QUESTIONS 1 & 2: 0
1. LITTLE INTEREST OR PLEASURE IN DOING THINGS: 0
2. FEELING DOWN, DEPRESSED OR HOPELESS: 0
SUM OF ALL RESPONSES TO PHQ QUESTIONS 1-9: 0
SUM OF ALL RESPONSES TO PHQ QUESTIONS 1-9: 0

## 2019-06-19 ASSESSMENT — ENCOUNTER SYMPTOMS
SHORTNESS OF BREATH: 0
BACK PAIN: 1
COUGH: 0
RESPIRATORY NEGATIVE: 1
WHEEZING: 0
GASTROINTESTINAL NEGATIVE: 1

## 2019-06-19 NOTE — PATIENT INSTRUCTIONS
You may receive a survey regarding the care you received during your visit. Your input is valuable to us. We encourage you to complete and return your survey. We hope you will choose us in the future for your healthcare needs. Continue present medications. Increase Lisinopril 20 mg twice daily to help control BP better. Continue present pain medication for orthopedic pain.

## 2019-06-19 NOTE — PROGRESS NOTES
Visit Information    Have you changed or started any medications since your last visit including any over-the-counter medicines, vitamins, or herbal medicines? no   Are you having any side effects from any of your medications? -  no  Have you stopped taking any of your medications? Is so, why? -  no    Have you seen any other physician or provider since your last visit? Yes - Records Obtained  Have you had any other diagnostic tests since your last visit? Yes - Records Requested  Have you been seen in the emergency room and/or had an admission to a hospital since we last saw you? No  Have you had your routine dental cleaning in the past 6 months? na    Have you activated your [employer] account? If not, what are your barriers?  Yes     Patient Care Team:  Melba Burns MD as PCP - General (Family Medicine)  Melba Burns MD as PCP - [facility]    Medical History Review  Past Medical, Family, and Social History reviewed and does contribute to the patient presenting condition    Health Maintenance   Topic Date Due    Hepatitis C screen  1965    HIV screen  12/30/1978    DTaP/Tdap/Td vaccine (1 - Tdap) 12/30/1982    Shingles Vaccine (1 of 2) 12/30/2013    Low dose CT lung screening  12/30/2018    A1C test (Diabetic or Prediabetic)  05/05/2019    Potassium monitoring  05/04/2020    Creatinine monitoring  05/04/2020    Lipid screen  05/04/2024    Colon cancer screen colonoscopy  10/21/2028    Flu vaccine  Completed    Pneumococcal 0-64 years Vaccine  Aged Out

## 2019-06-19 NOTE — PROGRESS NOTES
Subjective:      Patient ID: Kylie Garcia. is a 52 y.o. male. HPI  Follow up of chronic conditions. Encounter Diagnoses   Name Primary?  Primary osteoarthritis of right knee, bone on bone     Essential hypertension Yes    Lumbar disc disease with radiculopathy, L5-S1 with sciatica     Hx of lumbar discectomy, L5-S1, with one level fusion, 11/18/16.  Varicose veins of both lower extremities with pain      Patient is here for several concerns. First he is going to be having a partial knee replacement of his right knee and will need orders for preoperative testing. I will wait to receive those from his orthopedist who is down in [place]. Preoperative evaluation will be set up 2 weeks prior to his surgery date. His blood pressure has been elevated when checked at several physicians offices with systolics as high as 015 and diastolics around 087. He typically never runs out in our office but given the fact that 20 mg of lisinopril daily is not keeping a lid on his blood pressure excursion, I will have him increase it to 20 mg twice daily. His blood pressure has been labile so meds are changed. BP Readings from Last 3 Encounters:   06/19/19 138/76   06/17/19 (!) 148/66   03/18/19 138/68     The varicosities of both of his legs are still an ongoing problem for him. He does have some aching in the right leg greater than the left leg where the varicosities have been more pronounced over the years. The hemosiderin tattooing of the skin is worse in the right leg versus the left. He denies having difficulty with peripheral edema and at this point, states that he does not need to have any venous intervention. His lumbar disc disease is an ongoing issue causing pain. We know of his pain medications will be given today. This allows him to remain functional.    The rest of this patient's conditions are stable. Past medical and surgical hx reviewed.   Past Medical History:   Diagnosis Date  Anxiety     Arthritis     Hyperlipemia     Hypertension      Past Surgical History:   Procedure Laterality Date    BACK SURGERY  11/18/2016    L-5 and bulging disc repair 2 rods and 4 screws with fusion at [facility] with Dr. Domingo Melendez  2002    COLONOSCOPY  10/13/2017    Dr Marylee Edouard  2014    Dr Marylee Edouard  2013    COLONOSCOPY Left 10/21/2018    COLONOSCOPY CONTROL HEMORRHAGE performed by Opal Yadav MD at [address] [facility]  09/28/2018    banding-Dr Galo Sykes KNEE ARTHROSCOPY Bilateral     mensicus repair-Dr Stephen Fuens    AL OFFICE/OUTPT VISIT,PROCEDURE ONLY N/A 10/24/2018    SIGMOIDOSCOPY BIOPSY FLEXIBLE performed by Thomas Rodriguez MD at [address] [facility]  2013    Dr Jennifer Moore  10/21/2018    EGD ESOPHAGOGASTRODUODENOSCOPY performed by Opal Yadav MD at [facility]     Portions of this note were completed with a voice recording program.  Efforts were made to edit the dictations but occasionally words are mis-transcribed. Review of Systems   Constitutional: Negative. HENT: Negative. Respiratory: Negative. Negative for cough, shortness of breath and wheezing. Cardiovascular: Negative. Negative for chest pain, palpitations and leg swelling. Gastrointestinal: Negative. Endocrine: Negative. Genitourinary: Negative. Musculoskeletal: Positive for arthralgias and back pain. Negative for joint swelling. Skin: Negative. Allergic/Immunologic: Negative for environmental allergies. Neurological: Negative for dizziness, light-headedness and headaches. Hematological: Negative. Psychiatric/Behavioral: Negative. All other systems reviewed and are negative. Objective:   Physical Exam   Constitutional: He is oriented to person, place, and time. He appears well-developed and well-nourished.    HENT:   Right Ear: External ear normal.   Left Ear: External ear normal.   Nose: Nose normal.   Mouth/Throat: Oropharynx is clear and moist.   Eyes: Pupils are equal, round, and reactive to light. Neck: No thyromegaly present. Cardiovascular: Normal rate, regular rhythm, normal heart sounds and intact distal pulses. Exam reveals no gallop. No murmur heard. Pulmonary/Chest: Effort normal and breath sounds normal. He has no wheezes. He has no rales. Abdominal: Soft. Bowel sounds are normal. He exhibits no distension. There is no tenderness. There is no guarding. Musculoskeletal: He exhibits tenderness. He exhibits no edema or deformity. Lumbar back: He exhibits decreased range of motion, tenderness, bony tenderness and pain. He exhibits no spasm. Legs:  Lymphadenopathy:     He has no cervical adenopathy. Neurological: He is alert and oriented to person, place, and time. Skin: Skin is warm and dry. Capillary refill takes less than 2 seconds. Psychiatric: He has a normal mood and affect. Nursing note and vitals reviewed. Assessment:       Diagnosis Orders   1. Essential hypertension  lisinopril (PRINIVIL;ZESTRIL) 20 MG tablet   2. Primary osteoarthritis of right knee, bone on bone  HYDROcodone-acetaminophen (NORCO) 7.5-325 MG per tablet   3. Lumbar disc disease with radiculopathy, L5-S1 with sciatica  HYDROcodone-acetaminophen (NORCO) 7.5-325 MG per tablet   4. Hx of lumbar discectomy, L5-S1, with one level fusion, 11/18/16. HYDROcodone-acetaminophen (NORCO) 7.5-325 MG per tablet   5. Varicose veins of both lower extremities with pain             Plan:      No orders of the defined types were placed in this encounter.     Medications Discontinued During This Encounter   Medication Reason    HYDROcodone-acetaminophen (NORCO) 7.5-325 MG per tablet REORDER    lisinopril (PRINIVIL;ZESTRIL) 20 MG tablet DOSE ADJUSTMENT     Current Outpatient Medications   Medication Sig Dispense Refill    HYDROcodone-acetaminophen (NORCO) 7.5-325 MG per tablet Take 1 tablet by mouth every 8 hours as needed for Pain for up to 30 days. Fill 6/22/19. 90 tablet 0    lisinopril (PRINIVIL;ZESTRIL) 20 MG tablet Take 1 tablet by mouth 2 times daily 180 tablet 3    tadalafil (CIALIS) 20 MG tablet Take 1 tablet by mouth as needed for Erectile Dysfunction 30 tablet 3    atorvastatin (LIPITOR) 40 MG tablet TAKE 1 TABLET BY MOUTH NIGHTLY 90 tablet 3    ALPRAZolam (XANAX) 1 MG tablet Take 1 tablet by mouth 3 times daily as needed for Sleep for up to 270 days. 270 tablet 1    sildenafil (REVATIO) 20 MG tablet Take 1 tablet by mouth daily as needed (ED) 30 tablet 5    omeprazole (PRILOSEC) 40 MG delayed release capsule Take 1 capsule by mouth daily 30 capsule 3    metaxalone (SKELAXIN) 800 MG tablet Take 800 mg by mouth 3 times daily as needed for Pain      CALCIUM CARBONATE-VITAMIN D PO Take 2 tablets by mouth daily Calcium carbonate 600 mg tablet (total from taking 2 tablets is 1200 mg), unsure of vitamin D dose      hydrOXYzine (ATARAX) 25 MG tablet Take 1 tablet by mouth 3 times daily as needed for Itching 120 tablet 2    B Complex Vitamins (VITAMIN B COMPLEX PO) Take 1 tablet by mouth daily One daily       Multiple Vitamins-Minerals (MULTIVITAMIN & MINERAL PO) Take 1 tablet by mouth daily One daily--one bottle refill for one year        No current facility-administered medications for this visit. Continue present medications. Increase Lisinopril 20 mg twice daily to help control BP better. Continue present pain medication for orthopedic pain. Discussed use, benefit, and side effects of prescribed medications. Barriers to compliance discussed. All patient questions answered. Pt voiced understanding.           Don Norman MD

## 2019-06-20 ENCOUNTER — TELEPHONE (OUTPATIENT)
Dept: FAMILY MEDICINE CLINIC | Age: 54
End: 2019-06-20

## 2019-06-20 NOTE — TELEPHONE ENCOUNTER
Spoke with Fredo Dawkins [address] [place] [address] at [facility] and notified to fill early.

## 2019-06-20 NOTE — TELEPHONE ENCOUNTER
Patient is calling stating [facility] will not fill his Norco because the order says 6/22/19 on it, he is leaving for vacation at 7 am on 6/21/19.  Please advise to patient at [phone]

## 2019-06-20 NOTE — TELEPHONE ENCOUNTER
Call [facility] and tell them that I am giving them the okay to fill it a day early due to his travel plans.

## 2019-07-02 ENCOUNTER — TELEPHONE (OUTPATIENT)
Dept: FAMILY MEDICINE CLINIC | Age: 54
End: 2019-07-02

## 2019-07-02 DIAGNOSIS — K21.9 GASTROESOPHAGEAL REFLUX DISEASE WITHOUT ESOPHAGITIS: ICD-10-CM

## 2019-07-02 DIAGNOSIS — M17.11 PRIMARY OSTEOARTHRITIS OF RIGHT KNEE: ICD-10-CM

## 2019-07-02 DIAGNOSIS — Z01.818 PREOP EXAMINATION: Primary | ICD-10-CM

## 2019-07-02 DIAGNOSIS — I10 ESSENTIAL HYPERTENSION: ICD-10-CM

## 2019-07-02 NOTE — TELEPHONE ENCOUNTER
ADELSO 6/19/19 Pt having surgery done at [facility] 7/18/19 with Dr Darrius Sellers. States that he brought the paperwork in at his last appt of what testing needs done. Asking if Dr Rhodia Spatz needs to see him again, and where he needs to get the testing done.   Call with response [phone]

## 2019-07-08 ENCOUNTER — OFFICE VISIT (OUTPATIENT)
Dept: FAMILY MEDICINE CLINIC | Age: 54
End: 2019-07-08
Payer: COMMERCIAL

## 2019-07-08 VITALS — WEIGHT: 220.9 LBS | BODY MASS INDEX: 26.89 KG/M2

## 2019-07-08 DIAGNOSIS — Z01.818 PREOP EXAMINATION: Primary | ICD-10-CM

## 2019-07-08 DIAGNOSIS — I10 ESSENTIAL HYPERTENSION: ICD-10-CM

## 2019-07-08 DIAGNOSIS — M17.11 PRIMARY OSTEOARTHRITIS OF RIGHT KNEE: ICD-10-CM

## 2019-07-08 DIAGNOSIS — M51.16 LUMBAR DISC DISEASE WITH RADICULOPATHY: ICD-10-CM

## 2019-07-08 PROCEDURE — 93000 ELECTROCARDIOGRAM COMPLETE: CPT | Performed by: FAMILY MEDICINE

## 2019-07-08 PROCEDURE — 99242 OFF/OP CONSLTJ NEW/EST SF 20: CPT | Performed by: FAMILY MEDICINE

## 2019-07-08 ASSESSMENT — ENCOUNTER SYMPTOMS
GASTROINTESTINAL NEGATIVE: 1
RESPIRATORY NEGATIVE: 1
ALLERGIC/IMMUNOLOGIC NEGATIVE: 1

## 2019-07-08 NOTE — PROGRESS NOTES
Take 1 tablet by mouth daily One daily--one bottle refill for one year        No current facility-administered medications for this visit. Get pre-op labs tomorrow. Clearance will be given once they are reviewed. July 10, 2019: Labs and chest x-ray are reviewed. EKG is normal as well as all preop testing so he is clear for surgery.           Brigida Hodgkin, MD

## 2019-07-09 ENCOUNTER — HOSPITAL ENCOUNTER (OUTPATIENT)
Age: 54
Discharge: HOME | End: 2019-07-09
Payer: COMMERCIAL

## 2019-07-09 DIAGNOSIS — I10 ESSENTIAL HYPERTENSION: ICD-10-CM

## 2019-07-09 DIAGNOSIS — Z01.818 PREOP EXAMINATION: ICD-10-CM

## 2019-07-09 DIAGNOSIS — K21.9 GASTROESOPHAGEAL REFLUX DISEASE WITHOUT ESOPHAGITIS: ICD-10-CM

## 2019-07-09 LAB
ANION GAP SERPL CALCULATED.3IONS-SCNC: 18 MEQ/L (ref 8–16)
APTT: 32.4 SECONDS (ref 22–38)
BASOPHILS # BLD: 0.5 %
BASOPHILS ABSOLUTE: 0 THOU/MM3 (ref 0–0.1)
BILIRUBIN URINE: NEGATIVE
BLOOD, URINE: NEGATIVE
BUN BLDV-MCNC: 14 MG/DL (ref 7–22)
CALCIUM SERPL-MCNC: 9.9 MG/DL (ref 8.5–10.5)
CHARACTER, URINE: CLEAR
CHLORIDE BLD-SCNC: 97 MEQ/L (ref 98–111)
CO2: 25 MEQ/L (ref 23–33)
COLOR: YELLOW
CREAT SERPL-MCNC: 0.7 MG/DL (ref 0.4–1.2)
EOSINOPHIL # BLD: 2.1 %
EOSINOPHILS ABSOLUTE: 0.1 THOU/MM3 (ref 0–0.4)
ERYTHROCYTE [DISTWIDTH] IN BLOOD BY AUTOMATED COUNT: 12.5 % (ref 11.5–14.5)
ERYTHROCYTE [DISTWIDTH] IN BLOOD BY AUTOMATED COUNT: 42.9 FL (ref 35–45)
GFR SERPL CREATININE-BSD FRML MDRD: > 90 ML/MIN/1.73M2
GLUCOSE BLD-MCNC: 92 MG/DL (ref 70–108)
GLUCOSE URINE: NEGATIVE MG/DL
HCT VFR BLD CALC: 40.7 % (ref 42–52)
HEMOGLOBIN: 14 GM/DL (ref 14–18)
IMMATURE GRANS (ABS): 0.01 THOU/MM3 (ref 0–0.07)
IMMATURE GRANULOCYTES: 0.2 %
INR BLD: 0.95 (ref 0.85–1.13)
KETONES, URINE: NEGATIVE
LEUKOCYTE ESTERASE, URINE: NEGATIVE
LYMPHOCYTES # BLD: 33 %
LYMPHOCYTES ABSOLUTE: 2.1 THOU/MM3 (ref 1–4.8)
MCH RBC QN AUTO: 31.8 PG (ref 26–33)
MCHC RBC AUTO-ENTMCNC: 34.4 GM/DL (ref 32.2–35.5)
MCV RBC AUTO: 92.5 FL (ref 80–94)
MONOCYTES # BLD: 7.9 %
MONOCYTES ABSOLUTE: 0.5 THOU/MM3 (ref 0.4–1.3)
NITRITE, URINE: NEGATIVE
NUCLEATED RED BLOOD CELLS: 0 /100 WBC
PH UA: 6 (ref 5–9)
PLATELET # BLD: 194 THOU/MM3 (ref 130–400)
PMV BLD AUTO: 9.6 FL (ref 9.4–12.4)
POTASSIUM SERPL-SCNC: 4.1 MEQ/L (ref 3.5–5.2)
PROTEIN UA: NEGATIVE
RBC # BLD: 4.4 MILL/MM3 (ref 4.7–6.1)
SEG NEUTROPHILS: 56.3 %
SEGMENTED NEUTROPHILS ABSOLUTE COUNT: 3.5 THOU/MM3 (ref 1.8–7.7)
SODIUM BLD-SCNC: 140 MEQ/L (ref 135–145)
SPECIFIC GRAVITY, URINE: < 1.005 (ref 1–1.03)
UROBILINOGEN, URINE: 0.2 EU/DL (ref 0–1)
WBC # BLD: 6.3 THOU/MM3 (ref 4.8–10.8)

## 2019-07-09 PROCEDURE — 85610 PROTHROMBIN TIME: CPT

## 2019-07-09 PROCEDURE — 85730 THROMBOPLASTIN TIME PARTIAL: CPT

## 2019-07-09 PROCEDURE — 80048 BASIC METABOLIC PNL TOTAL CA: CPT

## 2019-07-09 PROCEDURE — 85025 COMPLETE CBC W/AUTO DIFF WBC: CPT

## 2019-07-09 PROCEDURE — 81003 URINALYSIS AUTO W/O SCOPE: CPT

## 2019-07-09 PROCEDURE — 36415 COLL VENOUS BLD VENIPUNCTURE: CPT

## 2019-07-10 ENCOUNTER — TELEPHONE (OUTPATIENT)
Dept: FAMILY MEDICINE CLINIC | Age: 54
End: 2019-07-10

## 2019-07-10 ENCOUNTER — HOSPITAL ENCOUNTER (OUTPATIENT)
Dept: GENERAL RADIOLOGY | Age: 54
Discharge: HOME | End: 2019-07-10
Payer: COMMERCIAL

## 2019-07-10 ENCOUNTER — HOSPITAL ENCOUNTER (OUTPATIENT)
Age: 54
Discharge: HOME | End: 2019-07-10
Payer: COMMERCIAL

## 2019-07-10 DIAGNOSIS — Z01.818 PREOP EXAMINATION: ICD-10-CM

## 2019-07-10 DIAGNOSIS — K21.9 GASTROESOPHAGEAL REFLUX DISEASE WITHOUT ESOPHAGITIS: ICD-10-CM

## 2019-07-10 DIAGNOSIS — I10 ESSENTIAL HYPERTENSION: ICD-10-CM

## 2019-07-10 PROCEDURE — 71046 X-RAY EXAM CHEST 2 VIEWS: CPT

## 2019-07-10 NOTE — RESULT ENCOUNTER NOTE
I have all of his presurgical results back except chest x-ray report. Check with the patient to see if he is gotten that done yet as I cannot clear him for surgery until I received that report.

## 2019-07-14 ENCOUNTER — HOSPITAL ENCOUNTER (EMERGENCY)
Age: 54
Discharge: HOME | End: 2019-07-14
Attending: FAMILY MEDICINE
Payer: COMMERCIAL

## 2019-07-14 VITALS
OXYGEN SATURATION: 98 % | BODY MASS INDEX: 26.55 KG/M2 | WEIGHT: 218 LBS | SYSTOLIC BLOOD PRESSURE: 159 MMHG | RESPIRATION RATE: 16 BRPM | HEIGHT: 76 IN | HEART RATE: 90 BPM | DIASTOLIC BLOOD PRESSURE: 92 MMHG

## 2019-07-14 DIAGNOSIS — R03.0 ELEVATED BLOOD PRESSURE READING: Primary | ICD-10-CM

## 2019-07-14 DIAGNOSIS — I83.891 BLEEDING FROM VARICOSE VEINS OF RIGHT LOWER EXTREMITY: ICD-10-CM

## 2019-07-14 DIAGNOSIS — I83.91 SPIDER VEIN OF RIGHT LOWER EXTREMITY: ICD-10-CM

## 2019-07-14 LAB
APTT: 27.7 SECONDS (ref 22–38)
BASOPHILS # BLD: 0.4 %
BASOPHILS ABSOLUTE: 0 THOU/MM3 (ref 0–0.1)
EOSINOPHIL # BLD: 2 %
EOSINOPHILS ABSOLUTE: 0.1 THOU/MM3 (ref 0–0.4)
ERYTHROCYTE [DISTWIDTH] IN BLOOD BY AUTOMATED COUNT: 12.4 % (ref 11.5–14.5)
ERYTHROCYTE [DISTWIDTH] IN BLOOD BY AUTOMATED COUNT: 43.3 FL (ref 35–45)
HCT VFR BLD CALC: 39 % (ref 42–52)
HEMOGLOBIN: 13.3 GM/DL (ref 14–18)
IMMATURE GRANS (ABS): 0.01 THOU/MM3 (ref 0–0.07)
IMMATURE GRANULOCYTES: 0.2 %
INR BLD: 0.94 (ref 0.85–1.13)
LYMPHOCYTES # BLD: 31 %
LYMPHOCYTES ABSOLUTE: 1.7 THOU/MM3 (ref 1–4.8)
MCH RBC QN AUTO: 32.1 PG (ref 26–33)
MCHC RBC AUTO-ENTMCNC: 34.1 GM/DL (ref 32.2–35.5)
MCV RBC AUTO: 94.2 FL (ref 80–94)
MONOCYTES # BLD: 6.1 %
MONOCYTES ABSOLUTE: 0.3 THOU/MM3 (ref 0.4–1.3)
NUCLEATED RED BLOOD CELLS: 0 /100 WBC
PLATELET # BLD: 169 THOU/MM3 (ref 130–400)
PMV BLD AUTO: 9.5 FL (ref 9.4–12.4)
RBC # BLD: 4.14 MILL/MM3 (ref 4.7–6.1)
SEG NEUTROPHILS: 60.3 %
SEGMENTED NEUTROPHILS ABSOLUTE COUNT: 3.3 THOU/MM3 (ref 1.8–7.7)
WBC # BLD: 5.5 THOU/MM3 (ref 4.8–10.8)

## 2019-07-14 PROCEDURE — 36415 COLL VENOUS BLD VENIPUNCTURE: CPT

## 2019-07-14 PROCEDURE — 85025 COMPLETE CBC W/AUTO DIFF WBC: CPT

## 2019-07-14 PROCEDURE — 85610 PROTHROMBIN TIME: CPT

## 2019-07-14 PROCEDURE — 99283 EMERGENCY DEPT VISIT LOW MDM: CPT

## 2019-07-14 PROCEDURE — 85730 THROMBOPLASTIN TIME PARTIAL: CPT

## 2019-07-14 ASSESSMENT — ENCOUNTER SYMPTOMS
WHEEZING: 0
SHORTNESS OF BREATH: 0
RHINORRHEA: 0
NAUSEA: 0
DIARRHEA: 0
BACK PAIN: 0
SORE THROAT: 0
ABDOMINAL PAIN: 0
COUGH: 0
EYE DISCHARGE: 0
VOMITING: 0
EYE REDNESS: 0

## 2019-07-14 NOTE — ED PROVIDER NOTES
and suicidal ideas. The patient is not nervous/anxious. PAST MEDICAL HISTORY    has a past medical history of Anxiety, Arthritis, Hyperlipemia, Hypertension, and Ruptured varicose vein. SURGICAL HISTORY    has a past surgical history that includes back surgery (11/18/2016); Knee arthroscopy (Bilateral); back surgery (2002); Colonoscopy (10/13/2017); Hemorrhoid surgery (09/28/2018); Colonoscopy (2014); Colonoscopy (2013); Upper gastrointestinal endoscopy (2013); Colonoscopy (Left, 10/21/2018); Upper gastrointestinal endoscopy (10/21/2018); and pr office/outpt visit,procedure only (N/A, 10/24/2018). CURRENT MEDICATIONS       Discharge Medication List as of 7/14/2019 12:56 PM      CONTINUE these medications which have NOT CHANGED    Details   HYDROcodone-acetaminophen (NORCO) 7.5-325 MG per tablet Take 1 tablet by mouth every 8 hours as needed for Pain for up to 30 days. Fill 6/22/19., Disp-90 tablet, R-0Print      lisinopril (PRINIVIL;ZESTRIL) 20 MG tablet Take 1 tablet by mouth 2 times daily, Disp-180 tablet, R-3Adjust Sig      tadalafil (CIALIS) 20 MG tablet Take 1 tablet by mouth as needed for Erectile Dysfunction, Disp-30 tablet, R-3Print      atorvastatin (LIPITOR) 40 MG tablet TAKE 1 TABLET BY MOUTH NIGHTLY, Disp-90 tablet, R-3Normal      ALPRAZolam (XANAX) 1 MG tablet Take 1 tablet by mouth 3 times daily as needed for Sleep for up to 270 days. , Disp-270 tablet, R-1Normal      sildenafil (REVATIO) 20 MG tablet Take 1 tablet by mouth daily as needed (ED), Disp-30 tablet, R-5Print      omeprazole (PRILOSEC) 40 MG delayed release capsule Take 1 capsule by mouth daily, Disp-30 capsule, R-3Normal      metaxalone (SKELAXIN) 800 MG tablet Take 800 mg by mouth 3 times daily as needed for PainHistorical Med      CALCIUM CARBONATE-VITAMIN D PO Take 2 tablets by mouth daily Calcium carbonate 600 mg tablet (total from taking 2 tablets is 1200 mg), unsure of vitamin D doseHistorical Med      hydrOXYzine (ATARAX) Cheng Heredia MD  07/14/19 2006

## 2019-07-14 NOTE — ED NOTES
Bed: 001A  Expected date: 7/14/19  Expected time: 10:51 AM  Means of arrival: [facility]  Comments:     Norma Sepulveda RN  07/14/19 2771

## 2019-07-15 ENCOUNTER — TELEPHONE (OUTPATIENT)
Dept: FAMILY MEDICINE CLINIC | Age: 54
End: 2019-07-15

## 2019-07-16 ENCOUNTER — CARE COORDINATION (OUTPATIENT)
Dept: CASE MANAGEMENT | Age: 54
End: 2019-07-16

## 2019-08-19 ENCOUNTER — OFFICE VISIT (OUTPATIENT)
Dept: FAMILY MEDICINE CLINIC | Age: 54
End: 2019-08-19
Payer: COMMERCIAL

## 2019-08-19 VITALS
WEIGHT: 218.8 LBS | DIASTOLIC BLOOD PRESSURE: 76 MMHG | HEART RATE: 80 BPM | BODY MASS INDEX: 26.65 KG/M2 | RESPIRATION RATE: 16 BRPM | SYSTOLIC BLOOD PRESSURE: 130 MMHG | HEIGHT: 76 IN

## 2019-08-19 DIAGNOSIS — M15.9 PRIMARY OSTEOARTHRITIS INVOLVING MULTIPLE JOINTS: ICD-10-CM

## 2019-08-19 DIAGNOSIS — Z98.890 HX OF LUMBAR DISCECTOMY: ICD-10-CM

## 2019-08-19 DIAGNOSIS — M17.11 PRIMARY OSTEOARTHRITIS OF RIGHT KNEE: ICD-10-CM

## 2019-08-19 DIAGNOSIS — I10 ESSENTIAL HYPERTENSION: Primary | ICD-10-CM

## 2019-08-19 DIAGNOSIS — I83.813 VARICOSE VEINS OF BOTH LOWER EXTREMITIES WITH PAIN: ICD-10-CM

## 2019-08-19 DIAGNOSIS — M51.16 LUMBAR DISC DISEASE WITH RADICULOPATHY: ICD-10-CM

## 2019-08-19 PROCEDURE — 99214 OFFICE O/P EST MOD 30 MIN: CPT | Performed by: FAMILY MEDICINE

## 2019-08-19 RX ORDER — HYDROCODONE BITARTRATE AND ACETAMINOPHEN 10; 325 MG/1; MG/1
1 TABLET ORAL EVERY 8 HOURS PRN
Qty: 90 TABLET | Refills: 0 | Status: SHIPPED | OUTPATIENT
Start: 2019-08-19 | End: 2019-09-25 | Stop reason: SDUPTHER

## 2019-08-19 RX ORDER — HYDROCODONE BITARTRATE AND ACETAMINOPHEN 7.5; 325 MG/1; MG/1
1 TABLET ORAL EVERY 8 HOURS PRN
Qty: 90 TABLET | Refills: 0 | Status: CANCELLED | OUTPATIENT
Start: 2019-08-19 | End: 2019-09-18

## 2019-08-19 ASSESSMENT — ENCOUNTER SYMPTOMS
GASTROINTESTINAL NEGATIVE: 1
BACK PAIN: 1
ALLERGIC/IMMUNOLOGIC NEGATIVE: 1
RESPIRATORY NEGATIVE: 1
SHORTNESS OF BREATH: 0
EYES NEGATIVE: 1

## 2019-08-19 NOTE — PROGRESS NOTES
Visit Information    Have you changed or started any medications since your last visit including any over-the-counter medicines, vitamins, or herbal medicines? no   Are you having any side effects from any of your medications? -  no  Have you stopped taking any of your medications? Is so, why? -  no    Have you seen any other physician or provider since your last visit? Yes - Records Obtained  Have you had any other diagnostic tests since your last visit? No  Have you been seen in the emergency room and/or had an admission to a hospital since we last saw you? No  Have you had your routine dental cleaning in the past 6 months? yes -      Have you activated your [employer] account? If not, what are your barriers?  Yes     Patient Care Team:  Carmelina Najera MD as PCP - General (Family Medicine)  Carmelina Najera MD as PCP - [facility] Provider  Jeff De Jesus DO as Consulting Physician (Neurology)    Medical History Review  Past Medical, Family, and Social History reviewed and does contribute to the patient presenting condition    Health Maintenance   Topic Date Due    Hepatitis C screen  1965    HIV screen  12/30/1978    DTaP/Tdap/Td vaccine (1 - Tdap) 12/30/1982    Shingles Vaccine (1 of 2) 12/30/2013    Low dose CT lung screening  12/30/2018    A1C test (Diabetic or Prediabetic)  05/05/2019    Flu vaccine (1) 09/01/2019    Potassium monitoring  07/09/2020    Creatinine monitoring  07/09/2020    Lipid screen  05/04/2024    Colon cancer screen colonoscopy  10/21/2028    Pneumococcal 0-64 years Vaccine  Aged Out

## 2019-09-05 ENCOUNTER — HOSPITAL ENCOUNTER (OUTPATIENT)
Dept: INTERVENTIONAL RADIOLOGY/VASCULAR | Age: 54
Discharge: HOME | End: 2019-09-05
Payer: COMMERCIAL

## 2019-09-05 DIAGNOSIS — I73.9 CLAUDICATION (HCC): ICD-10-CM

## 2019-09-05 PROCEDURE — 93923 UPR/LXTR ART STDY 3+ LVLS: CPT

## 2019-09-13 ENCOUNTER — TELEPHONE (OUTPATIENT)
Dept: FAMILY MEDICINE CLINIC | Age: 54
End: 2019-09-13

## 2019-09-13 DIAGNOSIS — I10 ESSENTIAL HYPERTENSION: ICD-10-CM

## 2019-09-13 DIAGNOSIS — R68.89 ABNORMAL ANKLE BRACHIAL INDEX (ABI): Primary | ICD-10-CM

## 2019-09-13 NOTE — TELEPHONE ENCOUNTER
Orders for CTA of the right and left lower extremities is on the printer. This testing needs to be done before referral to vascular surgery. There is also an order for a basic metabolic panel to be done prior to the testing since it will involve use of contrast media.

## 2019-09-17 RX ORDER — MIDAZOLAM HYDROCHLORIDE 1 MG/ML
1 INJECTION INTRAMUSCULAR; INTRAVENOUS ONCE
Status: CANCELLED | OUTPATIENT
Start: 2019-09-17 | End: 2019-09-17

## 2019-09-17 RX ORDER — SODIUM CHLORIDE 450 MG/100ML
INJECTION, SOLUTION INTRAVENOUS CONTINUOUS
Status: CANCELLED | OUTPATIENT
Start: 2019-09-17

## 2019-09-17 RX ORDER — FENTANYL CITRATE 50 UG/ML
50 INJECTION, SOLUTION INTRAMUSCULAR; INTRAVENOUS ONCE
Status: CANCELLED | OUTPATIENT
Start: 2019-09-17 | End: 2019-09-17

## 2019-09-18 ENCOUNTER — TELEPHONE (OUTPATIENT)
Dept: FAMILY MEDICINE CLINIC | Age: 54
End: 2019-09-18

## 2019-09-18 ENCOUNTER — HOSPITAL ENCOUNTER (OUTPATIENT)
Dept: INTERVENTIONAL RADIOLOGY/VASCULAR | Age: 54
Discharge: HOME | End: 2019-09-18
Attending: RADIOLOGY | Admitting: RADIOLOGY
Payer: COMMERCIAL

## 2019-09-18 VITALS
OXYGEN SATURATION: 97 % | SYSTOLIC BLOOD PRESSURE: 136 MMHG | TEMPERATURE: 98.3 F | DIASTOLIC BLOOD PRESSURE: 79 MMHG | HEART RATE: 88 BPM | RESPIRATION RATE: 16 BRPM | BODY MASS INDEX: 26.55 KG/M2 | HEIGHT: 76 IN | WEIGHT: 218 LBS

## 2019-09-18 LAB
APTT: 29.4 SECONDS (ref 22–38)
CREAT SERPL-MCNC: 0.6 MG/DL (ref 0.4–1.2)
ERYTHROCYTE [DISTWIDTH] IN BLOOD BY AUTOMATED COUNT: 12.3 % (ref 11.5–14.5)
ERYTHROCYTE [DISTWIDTH] IN BLOOD BY AUTOMATED COUNT: 42.5 FL (ref 35–45)
GFR SERPL CREATININE-BSD FRML MDRD: > 90 ML/MIN/1.73M2
HCT VFR BLD CALC: 43.1 % (ref 42–52)
HEMOGLOBIN: 14.4 GM/DL (ref 14–18)
INR BLD: 0.84 (ref 0.85–1.13)
MCH RBC QN AUTO: 31.5 PG (ref 26–33)
MCHC RBC AUTO-ENTMCNC: 33.4 GM/DL (ref 32.2–35.5)
MCV RBC AUTO: 94.3 FL (ref 80–94)
PLATELET # BLD: 214 THOU/MM3 (ref 130–400)
PMV BLD AUTO: 9.5 FL (ref 9.4–12.4)
RBC # BLD: 4.57 MILL/MM3 (ref 4.7–6.1)
WBC # BLD: 5.6 THOU/MM3 (ref 4.8–10.8)

## 2019-09-18 PROCEDURE — 2709999900 HC NON-CHARGEABLE SUPPLY

## 2019-09-18 PROCEDURE — C1760 CLOSURE DEV, VASC: HCPCS

## 2019-09-18 PROCEDURE — 6360000004 HC RX CONTRAST MEDICATION: Performed by: RADIOLOGY

## 2019-09-18 PROCEDURE — 75625 CONTRAST EXAM ABDOMINL AORTA: CPT

## 2019-09-18 PROCEDURE — 85610 PROTHROMBIN TIME: CPT

## 2019-09-18 PROCEDURE — 85730 THROMBOPLASTIN TIME PARTIAL: CPT

## 2019-09-18 PROCEDURE — 36415 COLL VENOUS BLD VENIPUNCTURE: CPT

## 2019-09-18 PROCEDURE — 2580000003 HC RX 258: Performed by: RADIOLOGY

## 2019-09-18 PROCEDURE — 36247 INS CATH ABD/L-EXT ART 3RD: CPT

## 2019-09-18 PROCEDURE — 6360000002 HC RX W HCPCS: Performed by: RADIOLOGY

## 2019-09-18 PROCEDURE — 6360000002 HC RX W HCPCS

## 2019-09-18 PROCEDURE — 6370000000 HC RX 637 (ALT 250 FOR IP)

## 2019-09-18 PROCEDURE — C1769 GUIDE WIRE: HCPCS

## 2019-09-18 PROCEDURE — 6370000000 HC RX 637 (ALT 250 FOR IP): Performed by: RADIOLOGY

## 2019-09-18 PROCEDURE — 85027 COMPLETE CBC AUTOMATED: CPT

## 2019-09-18 PROCEDURE — C1894 INTRO/SHEATH, NON-LASER: HCPCS

## 2019-09-18 PROCEDURE — 82565 ASSAY OF CREATININE: CPT

## 2019-09-18 PROCEDURE — C1887 CATHETER, GUIDING: HCPCS

## 2019-09-18 PROCEDURE — 75716 ARTERY X-RAYS ARMS/LEGS: CPT

## 2019-09-18 PROCEDURE — 2500000003 HC RX 250 WO HCPCS

## 2019-09-18 RX ORDER — MIDAZOLAM HYDROCHLORIDE 1 MG/ML
1 INJECTION INTRAMUSCULAR; INTRAVENOUS ONCE
Status: COMPLETED | OUTPATIENT
Start: 2019-09-18 | End: 2019-09-18

## 2019-09-18 RX ORDER — BACITRACIN, NEOMYCIN, POLYMYXIN B 400; 3.5; 5 [USP'U]/G; MG/G; [USP'U]/G
OINTMENT TOPICAL ONCE
Status: COMPLETED | OUTPATIENT
Start: 2019-09-18 | End: 2019-09-18

## 2019-09-18 RX ORDER — SODIUM CHLORIDE 450 MG/100ML
INJECTION, SOLUTION INTRAVENOUS CONTINUOUS
Status: DISCONTINUED | OUTPATIENT
Start: 2019-09-18 | End: 2019-09-18 | Stop reason: HOSPADM

## 2019-09-18 RX ORDER — FENTANYL CITRATE 50 UG/ML
50 INJECTION, SOLUTION INTRAMUSCULAR; INTRAVENOUS ONCE
Status: COMPLETED | OUTPATIENT
Start: 2019-09-18 | End: 2019-09-18

## 2019-09-18 RX ORDER — HEPARIN SODIUM 1000 [USP'U]/ML
4000 INJECTION, SOLUTION INTRAVENOUS; SUBCUTANEOUS ONCE
Status: COMPLETED | OUTPATIENT
Start: 2019-09-18 | End: 2019-09-18

## 2019-09-18 RX ADMIN — MIDAZOLAM 1 MG: 1 INJECTION INTRAMUSCULAR; INTRAVENOUS at 08:51

## 2019-09-18 RX ADMIN — MIDAZOLAM 1 MG: 1 INJECTION INTRAMUSCULAR; INTRAVENOUS at 09:24

## 2019-09-18 RX ADMIN — FENTANYL CITRATE 50 MCG: 50 INJECTION INTRAMUSCULAR; INTRAVENOUS at 08:13

## 2019-09-18 RX ADMIN — SODIUM CHLORIDE: 4.5 INJECTION, SOLUTION INTRAVENOUS at 06:40

## 2019-09-18 RX ADMIN — FENTANYL CITRATE 50 MCG: 50 INJECTION INTRAMUSCULAR; INTRAVENOUS at 08:51

## 2019-09-18 RX ADMIN — FENTANYL CITRATE 50 MCG: 50 INJECTION INTRAMUSCULAR; INTRAVENOUS at 09:24

## 2019-09-18 RX ADMIN — IOPAMIDOL 185 ML: 612 INJECTION, SOLUTION INTRAVENOUS at 09:46

## 2019-09-18 RX ADMIN — FENTANYL CITRATE 50 MCG: 50 INJECTION INTRAMUSCULAR; INTRAVENOUS at 08:18

## 2019-09-18 RX ADMIN — HEPARIN SODIUM 4000 UNITS: 1000 INJECTION, SOLUTION INTRAVENOUS; SUBCUTANEOUS at 08:51

## 2019-09-18 RX ADMIN — MIDAZOLAM 1 MG: 1 INJECTION INTRAMUSCULAR; INTRAVENOUS at 08:18

## 2019-09-18 RX ADMIN — BACITRACIN, NEOMYCIN, POLYMYXIN B 1 G: 400; 3.5; 5 OINTMENT TOPICAL at 09:42

## 2019-09-18 RX ADMIN — MIDAZOLAM 1 MG: 1 INJECTION INTRAMUSCULAR; INTRAVENOUS at 08:13

## 2019-09-18 ASSESSMENT — PAIN SCALES - GENERAL
PAINLEVEL_OUTOF10: 0

## 2019-09-18 NOTE — BRIEF OP NOTE
Department of Radiology  Post Procedure Progress Note      Pre-Procedure Diagnosis:  PVD    Procedure Performed:  B/L arteriograms    Anesthesia: local / versed and fentanyl    Findings: Occlusion of the distal right SFA and proximal Popliteal arteries which are reconstituted via extensive collateral vasculature distally. The stenosis could not be successfully crossed after attempts with multiple catheters and wires    Immediate Complications:  None    Estimated Blood Loss: minimal    SEE DICTATED PROCEDURE NOTE FOR COMPLETE DETAILS.     Electronically signed by Shiva Vega MD on 9/18/2019 at 10:02 AM

## 2019-09-19 ENCOUNTER — TELEPHONE (OUTPATIENT)
Dept: FAMILY MEDICINE CLINIC | Age: 54
End: 2019-09-19

## 2019-09-25 DIAGNOSIS — M51.16 LUMBAR DISC DISEASE WITH RADICULOPATHY: ICD-10-CM

## 2019-09-25 DIAGNOSIS — M15.9 PRIMARY OSTEOARTHRITIS INVOLVING MULTIPLE JOINTS: ICD-10-CM

## 2019-09-25 DIAGNOSIS — Z98.890 HX OF LUMBAR DISCECTOMY: ICD-10-CM

## 2019-09-25 RX ORDER — HYDROCODONE BITARTRATE AND ACETAMINOPHEN 10; 325 MG/1; MG/1
1 TABLET ORAL EVERY 8 HOURS PRN
Qty: 90 TABLET | Refills: 0 | Status: SHIPPED | OUTPATIENT
Start: 2019-09-25 | End: 2019-10-29 | Stop reason: SDUPTHER

## 2019-10-17 ENCOUNTER — TELEPHONE (OUTPATIENT)
Dept: FAMILY MEDICINE CLINIC | Age: 54
End: 2019-10-17

## 2019-10-29 DIAGNOSIS — M51.16 LUMBAR DISC DISEASE WITH RADICULOPATHY: ICD-10-CM

## 2019-10-29 DIAGNOSIS — Z98.890 HX OF LUMBAR DISCECTOMY: ICD-10-CM

## 2019-10-29 DIAGNOSIS — M15.9 PRIMARY OSTEOARTHRITIS INVOLVING MULTIPLE JOINTS: ICD-10-CM

## 2019-10-29 RX ORDER — HYDROCODONE BITARTRATE AND ACETAMINOPHEN 10; 325 MG/1; MG/1
1 TABLET ORAL EVERY 8 HOURS PRN
Qty: 90 TABLET | Refills: 0 | Status: SHIPPED | OUTPATIENT
Start: 2019-10-29 | End: 2019-12-03 | Stop reason: SDUPTHER

## 2019-11-18 DIAGNOSIS — I10 ESSENTIAL HYPERTENSION: ICD-10-CM

## 2019-11-18 RX ORDER — LISINOPRIL 20 MG/1
20 TABLET ORAL 2 TIMES DAILY
Qty: 180 TABLET | Refills: 3 | Status: SHIPPED | OUTPATIENT
Start: 2019-11-18 | End: 2019-12-23 | Stop reason: SDUPTHER

## 2019-11-27 DIAGNOSIS — J30.2 ACUTE SEASONAL ALLERGIC RHINITIS: ICD-10-CM

## 2019-11-27 DIAGNOSIS — E78.00 HYPERCHOLESTEREMIA: ICD-10-CM

## 2019-11-27 RX ORDER — ATORVASTATIN CALCIUM 40 MG/1
40 TABLET, FILM COATED ORAL NIGHTLY
Qty: 90 TABLET | Refills: 3 | Status: SHIPPED | OUTPATIENT
Start: 2019-11-27 | End: 2020-11-23

## 2019-11-27 RX ORDER — HYDROXYZINE HYDROCHLORIDE 25 MG/1
25 TABLET, FILM COATED ORAL 3 TIMES DAILY PRN
Qty: 120 TABLET | Refills: 2 | Status: SHIPPED | OUTPATIENT
Start: 2019-11-27 | End: 2020-08-21 | Stop reason: SDUPTHER

## 2019-12-03 DIAGNOSIS — Z98.890 HX OF LUMBAR DISCECTOMY: ICD-10-CM

## 2019-12-03 DIAGNOSIS — M51.16 LUMBAR DISC DISEASE WITH RADICULOPATHY: ICD-10-CM

## 2019-12-03 DIAGNOSIS — M15.9 PRIMARY OSTEOARTHRITIS INVOLVING MULTIPLE JOINTS: ICD-10-CM

## 2019-12-03 RX ORDER — HYDROCODONE BITARTRATE AND ACETAMINOPHEN 10; 325 MG/1; MG/1
1 TABLET ORAL EVERY 8 HOURS PRN
Qty: 90 TABLET | Refills: 0 | Status: SHIPPED | OUTPATIENT
Start: 2019-12-03 | End: 2020-01-07 | Stop reason: SDUPTHER

## 2019-12-23 DIAGNOSIS — I10 ESSENTIAL HYPERTENSION: ICD-10-CM

## 2019-12-23 RX ORDER — LISINOPRIL 20 MG/1
20 TABLET ORAL 2 TIMES DAILY
Qty: 180 TABLET | Refills: 3 | Status: SHIPPED | OUTPATIENT
Start: 2019-12-23 | End: 2021-03-29

## 2020-01-07 RX ORDER — HYDROCODONE BITARTRATE AND ACETAMINOPHEN 10; 325 MG/1; MG/1
1 TABLET ORAL EVERY 8 HOURS PRN
Qty: 90 TABLET | Refills: 0 | Status: SHIPPED | OUTPATIENT
Start: 2020-01-07 | End: 2020-02-06

## 2020-02-07 RX ORDER — HYDROCODONE BITARTRATE AND ACETAMINOPHEN 10; 325 MG/1; MG/1
1 TABLET ORAL EVERY 8 HOURS PRN
Qty: 90 TABLET | Refills: 0 | Status: SHIPPED | OUTPATIENT
Start: 2020-02-07 | End: 2020-03-09 | Stop reason: SDUPTHER

## 2020-02-17 RX ORDER — ALPRAZOLAM 1 MG/1
1 TABLET ORAL 3 TIMES DAILY PRN
Qty: 270 TABLET | Refills: 1 | Status: SHIPPED | OUTPATIENT
Start: 2020-02-17 | End: 2020-02-18 | Stop reason: SDUPTHER

## 2020-02-18 RX ORDER — ALPRAZOLAM 1 MG/1
1 TABLET ORAL 3 TIMES DAILY PRN
Qty: 270 TABLET | Refills: 1 | Status: SHIPPED | OUTPATIENT
Start: 2020-02-18 | End: 2020-09-28 | Stop reason: SDUPTHER

## 2020-03-05 ENCOUNTER — OFFICE VISIT (OUTPATIENT)
Dept: FAMILY MEDICINE CLINIC | Age: 55
End: 2020-03-05
Payer: COMMERCIAL

## 2020-03-05 VITALS
WEIGHT: 223.4 LBS | HEART RATE: 88 BPM | DIASTOLIC BLOOD PRESSURE: 72 MMHG | SYSTOLIC BLOOD PRESSURE: 132 MMHG | BODY MASS INDEX: 27.19 KG/M2 | OXYGEN SATURATION: 98 % | RESPIRATION RATE: 20 BRPM

## 2020-03-05 PROBLEM — I73.9 PVD (PERIPHERAL VASCULAR DISEASE) (HCC): Status: ACTIVE | Noted: 2019-10-16

## 2020-03-05 PROBLEM — Z98.890 HISTORY OF OPERATIVE PROCEDURE ON LUMBOSACRAL SPINAL STRUCTURE: Status: ACTIVE | Noted: 2017-08-21

## 2020-03-05 PROBLEM — I73.9 INTERMITTENT CLAUDICATION (HCC): Status: ACTIVE | Noted: 2020-03-05

## 2020-03-05 PROCEDURE — 99213 OFFICE O/P EST LOW 20 MIN: CPT | Performed by: FAMILY MEDICINE

## 2020-03-05 RX ORDER — CILOSTAZOL 100 MG/1
TABLET ORAL
COMMUNITY
Start: 2020-01-31 | End: 2021-12-29

## 2020-03-05 SDOH — ECONOMIC STABILITY: TRANSPORTATION INSECURITY
IN THE PAST 12 MONTHS, HAS LACK OF TRANSPORTATION KEPT YOU FROM MEETINGS, WORK, OR FROM GETTING THINGS NEEDED FOR DAILY LIVING?: NO

## 2020-03-05 SDOH — ECONOMIC STABILITY: INCOME INSECURITY: HOW HARD IS IT FOR YOU TO PAY FOR THE VERY BASICS LIKE FOOD, HOUSING, MEDICAL CARE, AND HEATING?: NOT HARD AT ALL

## 2020-03-05 SDOH — ECONOMIC STABILITY: TRANSPORTATION INSECURITY
IN THE PAST 12 MONTHS, HAS THE LACK OF TRANSPORTATION KEPT YOU FROM MEDICAL APPOINTMENTS OR FROM GETTING MEDICATIONS?: NO

## 2020-03-05 SDOH — ECONOMIC STABILITY: FOOD INSECURITY: WITHIN THE PAST 12 MONTHS, THE FOOD YOU BOUGHT JUST DIDN'T LAST AND YOU DIDN'T HAVE MONEY TO GET MORE.: NEVER TRUE

## 2020-03-05 SDOH — ECONOMIC STABILITY: FOOD INSECURITY: WITHIN THE PAST 12 MONTHS, YOU WORRIED THAT YOUR FOOD WOULD RUN OUT BEFORE YOU GOT MONEY TO BUY MORE.: NEVER TRUE

## 2020-03-05 ASSESSMENT — PATIENT HEALTH QUESTIONNAIRE - PHQ9
SUM OF ALL RESPONSES TO PHQ9 QUESTIONS 1 & 2: 0
2. FEELING DOWN, DEPRESSED OR HOPELESS: 0
SUM OF ALL RESPONSES TO PHQ QUESTIONS 1-9: 0
SUM OF ALL RESPONSES TO PHQ QUESTIONS 1-9: 0
1. LITTLE INTEREST OR PLEASURE IN DOING THINGS: 0

## 2020-03-05 NOTE — PROGRESS NOTES
Visit Information    Have you changed or started any medications since your last visit including any over-the-counter medicines, vitamins, or herbal medicines? yes - list updated   Are you having any side effects from any of your medications? -  no  Have you stopped taking any of your medications? Is so, why? -  no    Have you seen any other physician or provider since your last visit? Yes - Records Obtained  Have you had any other diagnostic tests since your last visit? Yes - Records Obtained  Have you been seen in the emergency room and/or had an admission to a hospital since we last saw you? Yes - Records Obtained    Have you activated your [employer] account? If not, what are your barriers?  Yes     Patient Care Team:  Whit Delaney MD as PCP - General (Family Medicine)  Whit Delaney MD as PCP - [facility]  Arianna Byrd DO as Consulting Physician (Neurology)    Medical History Review  Past Medical, Family, and Social History reviewed and does not contribute to the patient presenting condition    Health Maintenance   Topic Date Due    Hepatitis C screen  1965    DTaP/Tdap/Td vaccine (1 - Tdap) 12/30/1974    HIV screen  12/30/1978    Shingles Vaccine (1 of 2) 12/30/2013    Low dose CT lung screening  12/30/2018    A1C test (Diabetic or Prediabetic)  05/05/2019    Lipid screen  11/02/2020    Potassium monitoring  11/02/2020    Creatinine monitoring  11/02/2020    Colon cancer screen colonoscopy  10/21/2028    Flu vaccine  Completed    Hepatitis A vaccine  Aged Out    Hepatitis B vaccine  Aged Out    Hib vaccine  Aged Out    Meningococcal (ACWY) vaccine  Aged Out    Pneumococcal 0-64 years Vaccine  Aged Out

## 2020-03-05 NOTE — PROGRESS NOTES
Subjective:      Patient ID: Solitario Johnson. is a 62 y.o. male. HPI  Encounter Diagnosis   Name Primary?  Chronic pain of left knee Yes     Patient comes in with some chronic left knee pain that had bothered him quite a bit the last 2 weeks but now has started to subside. He denies having difficulty with stairs or walking distances. He has not had an effusion nor has he had localized pain in the knee. When it was really bothering him, he complained of some medial discomfort but x-rays in the past have been obtained of the knee which show no degenerative changes. He denies having twisted or injured the knee within the last several months and is here for evaluation. Review of Systems   Musculoskeletal: Positive for arthralgias. See HPI. All other systems reviewed and are negative. Objective:   Physical Exam  Vitals signs and nursing note reviewed. Musculoskeletal:      Left knee: He exhibits normal range of motion, no swelling, no effusion, no LCL laxity, no bony tenderness, normal meniscus and no MCL laxity. No medial joint line, no lateral joint line, no MCL and no LCL tenderness noted. Legs:          Assessment:       Diagnosis Orders   1. Chronic pain of left knee             Plan:      No orders of the defined types were placed in this encounter. There are no discontinued medications. Current Outpatient Medications   Medication Sig Dispense Refill    cilostazol (PLETAL) 100 MG tablet Take one tablet by mouth twice daily, 30min before or 2hrs after breakfast/dinner meal .      ALPRAZolam (XANAX) 1 MG tablet Take 1 tablet by mouth 3 times daily as needed for Sleep for up to 270 days. 270 tablet 1    HYDROcodone-acetaminophen (NORCO)  MG per tablet Take 1 tablet by mouth every 8 hours as needed for Pain for up to 30 days.  Intended supply: 30 days 90 tablet 0    lisinopril (PRINIVIL;ZESTRIL) 20 MG tablet Take 1 tablet by mouth 2 times daily 180 tablet 3    atorvastatin (LIPITOR) 40 MG tablet Take 1 tablet by mouth nightly 90 tablet 3    hydrOXYzine (ATARAX) 25 MG tablet Take 1 tablet by mouth 3 times daily as needed for Itching 120 tablet 2    tadalafil (CIALIS) 20 MG tablet Take 1 tablet by mouth as needed for Erectile Dysfunction 30 tablet 3    sildenafil (REVATIO) 20 MG tablet Take 1 tablet by mouth daily as needed (ED) 30 tablet 5    omeprazole (PRILOSEC) 40 MG delayed release capsule Take 1 capsule by mouth daily 30 capsule 3    CALCIUM CARBONATE-VITAMIN D PO Take 2 tablets by mouth daily Calcium carbonate 600 mg tablet (total from taking 2 tablets is 1200 mg), unsure of vitamin D dose      B Complex Vitamins (VITAMIN B COMPLEX PO) Take 1 tablet by mouth daily One daily       Multiple Vitamins-Minerals (MULTIVITAMIN & MINERAL PO) Take 1 tablet by mouth daily One daily--one bottle refill for one year        No current facility-administered medications for this visit. Continue present medications. Discussed use, benefit, and side effects of prescribed medications. Barriers to compliance discussed. All patient questions answered. Pt voiced understanding.           Mary Grace Eden MD

## 2020-03-09 RX ORDER — HYDROCODONE BITARTRATE AND ACETAMINOPHEN 10; 325 MG/1; MG/1
1 TABLET ORAL EVERY 8 HOURS PRN
Qty: 90 TABLET | Refills: 0 | Status: SHIPPED | OUTPATIENT
Start: 2020-03-09 | End: 2020-04-12 | Stop reason: SDUPTHER

## 2020-04-10 ENCOUNTER — PATIENT MESSAGE (OUTPATIENT)
Dept: FAMILY MEDICINE CLINIC | Age: 55
End: 2020-04-10

## 2020-04-12 RX ORDER — HYDROCODONE BITARTRATE AND ACETAMINOPHEN 10; 325 MG/1; MG/1
1 TABLET ORAL EVERY 8 HOURS PRN
Qty: 90 TABLET | Refills: 0 | Status: SHIPPED | OUTPATIENT
Start: 2020-04-12 | End: 2020-05-14 | Stop reason: SDUPTHER

## 2020-05-14 RX ORDER — HYDROCODONE BITARTRATE AND ACETAMINOPHEN 10; 325 MG/1; MG/1
1 TABLET ORAL EVERY 8 HOURS PRN
Qty: 90 TABLET | Refills: 0 | Status: SHIPPED | OUTPATIENT
Start: 2020-05-14 | End: 2020-06-18 | Stop reason: SDUPTHER

## 2020-06-18 ENCOUNTER — PATIENT MESSAGE (OUTPATIENT)
Dept: FAMILY MEDICINE CLINIC | Age: 55
End: 2020-06-18

## 2020-06-18 RX ORDER — HYDROCODONE BITARTRATE AND ACETAMINOPHEN 10; 325 MG/1; MG/1
1 TABLET ORAL EVERY 8 HOURS PRN
Qty: 90 TABLET | Refills: 0 | Status: SHIPPED | OUTPATIENT
Start: 2020-06-18 | End: 2020-07-20 | Stop reason: SDUPTHER

## 2020-07-20 RX ORDER — HYDROCODONE BITARTRATE AND ACETAMINOPHEN 10; 325 MG/1; MG/1
1 TABLET ORAL EVERY 8 HOURS PRN
Qty: 90 TABLET | Refills: 0 | Status: SHIPPED | OUTPATIENT
Start: 2020-07-20 | End: 2020-08-21 | Stop reason: SDUPTHER

## 2020-07-22 RX ORDER — TADALAFIL 20 MG/1
20 TABLET ORAL PRN
Qty: 30 TABLET | Refills: 3 | Status: SHIPPED | OUTPATIENT
Start: 2020-07-22 | End: 2022-02-25 | Stop reason: SDUPTHER

## 2020-07-22 NOTE — TELEPHONE ENCOUNTER
Requested Prescriptions     Pending Prescriptions Disp Refills    tadalafil (CIALIS) 20 MG tablet 30 tablet 3     Sig: Take 1 tablet by mouth as needed for Erectile Dysfunction         If no call back patient will check with [facility] after 1 pm.

## 2020-08-21 RX ORDER — HYDROCODONE BITARTRATE AND ACETAMINOPHEN 10; 325 MG/1; MG/1
1 TABLET ORAL EVERY 8 HOURS PRN
Qty: 90 TABLET | Refills: 0 | Status: SHIPPED | OUTPATIENT
Start: 2020-08-21 | End: 2020-09-21 | Stop reason: SDUPTHER

## 2020-08-21 RX ORDER — HYDROXYZINE HYDROCHLORIDE 25 MG/1
25 TABLET, FILM COATED ORAL 3 TIMES DAILY PRN
Qty: 120 TABLET | Refills: 2 | Status: SHIPPED | OUTPATIENT
Start: 2020-08-21 | End: 2021-11-12 | Stop reason: SDUPTHER

## 2020-09-21 RX ORDER — HYDROCODONE BITARTRATE AND ACETAMINOPHEN 10; 325 MG/1; MG/1
1 TABLET ORAL EVERY 8 HOURS PRN
Qty: 90 TABLET | Refills: 0 | Status: SHIPPED | OUTPATIENT
Start: 2020-09-21 | End: 2020-10-21 | Stop reason: SDUPTHER

## 2020-09-21 RX ORDER — HYDROCODONE BITARTRATE AND ACETAMINOPHEN 10; 325 MG/1; MG/1
1 TABLET ORAL EVERY 8 HOURS PRN
Qty: 90 TABLET | Refills: 0 | Status: SHIPPED | OUTPATIENT
Start: 2020-09-21 | End: 2020-09-21 | Stop reason: SDUPTHER

## 2020-09-28 ENCOUNTER — OFFICE VISIT (OUTPATIENT)
Dept: FAMILY MEDICINE CLINIC | Age: 55
End: 2020-09-28
Payer: COMMERCIAL

## 2020-09-28 VITALS
RESPIRATION RATE: 14 BRPM | OXYGEN SATURATION: 98 % | HEIGHT: 76 IN | DIASTOLIC BLOOD PRESSURE: 72 MMHG | HEART RATE: 68 BPM | WEIGHT: 225.2 LBS | TEMPERATURE: 97 F | SYSTOLIC BLOOD PRESSURE: 120 MMHG | BODY MASS INDEX: 27.42 KG/M2

## 2020-09-28 PROCEDURE — 99214 OFFICE O/P EST MOD 30 MIN: CPT | Performed by: FAMILY MEDICINE

## 2020-09-28 RX ORDER — ALPRAZOLAM 1 MG/1
1 TABLET ORAL 3 TIMES DAILY PRN
Qty: 270 TABLET | Refills: 1 | Status: SHIPPED | OUTPATIENT
Start: 2020-09-28 | End: 2021-06-29 | Stop reason: SDUPTHER

## 2020-09-28 ASSESSMENT — ENCOUNTER SYMPTOMS
RESPIRATORY NEGATIVE: 1
COUGH: 0
BACK PAIN: 1
GASTROINTESTINAL NEGATIVE: 1

## 2020-09-28 NOTE — PROGRESS NOTES
Chronic Disease Visit Information    BP Readings from Last 3 Encounters:   09/28/20 120/72   03/05/20 132/72   09/18/19 136/79          Hemoglobin A1C (%)   Date Value   05/05/2018 5.8     LDL Calculated (mg/dL)   Date Value   11/02/2019 53     HDL   Date Value   11/02/2019 39 mg/dL (L)   05/05/2012 40 mg/dl     BUN (mg/dL)   Date Value   11/02/2019 9     CREATININE (mg/dL)   Date Value   11/02/2019 0.74     Glucose (mg/dL)   Date Value   11/02/2019 110            Have you changed or started any medications since your last visit including any over-the-counter medicines, vitamins, or herbal medicines? no   Are you having any side effects from any of your medications? -  no  Have you stopped taking any of your medications? Is so, why? -  no    Have you seen any other physician or provider since your last visit? No  Have you had any other diagnostic tests since your last visit? No  Have you been seen in the emergency room and/or had an admission to a hospital since we last saw you? No  Have you had your annual diabetic retinal (eye) exam? No  Have you had your routine dental cleaning in the past 6 months? yes -     Have you activated your [employer] account? If not, what are your barriers?  Yes     Patient Care Team:  Rudolph Smith MD as PCP - General (Family Medicine)  Rudolhp Smith MD as PCP - [facility], DO as Consulting Physician (Neurology)         Medical History Review  Past Medical, Family, and Social History reviewed and does contribute to the patient presenting condition    Health Maintenance   Topic Date Due    Hepatitis C screen  1965    HIV screen  12/30/1978    DTaP/Tdap/Td vaccine (1 - Tdap) 12/30/1982    Shingles Vaccine (1 of 2) 12/30/2013    Low dose CT lung screening  12/30/2018    A1C test (Diabetic or Prediabetic)  05/05/2019    Flu vaccine (1) 09/01/2020    Lipid screen  11/02/2020    Potassium monitoring  11/02/2020    Creatinine monitoring  11/02/2020    Colon cancer screen colonoscopy  10/21/2028    Hepatitis A vaccine  Aged Out    Hepatitis B vaccine  Aged Out    Hib vaccine  Aged Out    Meningococcal (ACWY) vaccine  Aged Out    Pneumococcal 0-64 years Vaccine  Aged Out

## 2020-09-28 NOTE — PROGRESS NOTES
Subjective:      Patient ID: Sulma Quiroz. is a 62 y.o. male. HPI  Follow up of chronic conditions. Encounter Diagnoses   Name Primary?  Anxiety     Lumbar disc disease with radiculopathy, L5-S1 with sciatica     Hx of lumbar discectomy, L5-S1, with one level fusion, 11/18/16.  Essential hypertension Yes    Hypercholesteremia     Gastroesophageal reflux disease without esophagitis     Medication monitoring encounter      HTN is stable with current medications. Pt has no medication side effects nor orthostatic symptoms. BP Readings from Last 3 Encounters:   09/28/20 120/72   03/05/20 132/72   09/18/19 136/79     Lipid values continue to be well controlled with current dose of Lipitor 40 mg nightly. He denies statin myalgias and continues to tolerate the meds well. Cholesterol, Total (no units)   Date Value   05/03/2014 177     Cholesterol (mg/dL)   Date Value   11/02/2019 138     HDL   Date Value   11/02/2019 39 mg/dL (L)   05/05/2012 40 mg/dl     Triglycerides (mg/dL)   Date Value   11/02/2019 228 (H)     Lab Results   Component Value Date    LDLCALC 53 11/02/2019     Since he has had his back surgery, his back continues to do well but the orthopedic pain related to other joints and intermittent episodes of back pain still respond to his routine use of Norco.    He also is developed some right shoulder pain which does not seem to be joint related. He points to the deltoid muscle as the region of tenderness. He has been using Motrin along with his Norco and this has provided some relief. Exam is consistent with a deltoid bursitis. Anxiety levels are still somewhat high but is present use of Xanax 1 mg 3 times daily keeps him functional.  This will be continued. He also is interested in hepatitis C screening so a test for hepatitis C antibody will be provided. The rest of this patient's conditions are stable. Past medical and surgical hx reviewed.   Past Medical History: Diagnosis Date    Anxiety     Arthritis     Hyperlipemia     Hypertension     Ruptured varicose vein      Past Surgical History:   Procedure Laterality Date    BACK SURGERY  11/18/2016    L-5 and bulging disc repair 2 rods and 4 screws with fusion at [facility] with Dr. Veronica Long    COLONOSCOPY  10/13/2017    Dr Kaylan Marquez  2014    Dr Kaylan Marquez  2013    COLONOSCOPY Left 10/21/2018    COLONOSCOPY CONTROL HEMORRHAGE performed by Carmen Alberto MD at [address] [facility]  09/28/2018    banding- [facility] Right 07/18/2019    knee partial replacement    KNEE ARTHROSCOPY Bilateral     mensicus repair-Dr Jewels Palacios    CO OFFICE/OUTPT VISIT,PROCEDURE ONLY N/A 10/24/2018    SIGMOIDOSCOPY BIOPSY FLEXIBLE performed by Mehreen Rayo MD at [facility]  2013    Dr Nelli Thompson  10/21/2018    EGD ESOPHAGOGASTRODUODENOSCOPY performed by Carmen Alberto MD at [facility]     Portions of this note were completed with a voice recording program.  Efforts were made to edit the dictations but occasionally words are mis-transcribed. Review of Systems   Constitutional: Negative. HENT: Negative. Negative for congestion. Respiratory: Negative. Negative for cough. Cardiovascular: Negative. Negative for chest pain, palpitations and leg swelling. Gastrointestinal: Negative. Endocrine: Negative. Genitourinary: Negative. Musculoskeletal: Positive for arthralgias and back pain. Skin: Negative. Allergic/Immunologic: Negative for environmental allergies. Neurological: Negative. Hematological: Negative. Psychiatric/Behavioral: The patient is nervous/anxious. All other systems reviewed and are negative. Objective:   Physical Exam  Vitals signs and nursing note reviewed. Constitutional:       Appearance: He is normal weight.    HENT: Head: Normocephalic and atraumatic. Right Ear: Tympanic membrane, ear canal and external ear normal.      Left Ear: Tympanic membrane, ear canal and external ear normal.      Nose: Nose normal.      Mouth/Throat:      Mouth: Mucous membranes are moist.      Pharynx: Oropharynx is clear. Eyes:      Conjunctiva/sclera: Conjunctivae normal.   Neck:      Vascular: No carotid bruit. Cardiovascular:      Rate and Rhythm: Normal rate and regular rhythm. Pulses: Normal pulses. Heart sounds: Normal heart sounds. No murmur. No gallop. Pulmonary:      Effort: Pulmonary effort is normal.      Breath sounds: Normal breath sounds. No wheezing, rhonchi or rales. Abdominal:      General: Bowel sounds are normal.   Musculoskeletal:      Right shoulder: He exhibits tenderness, crepitus and pain. He exhibits normal range of motion and no spasm. Lumbar back: He exhibits decreased range of motion, tenderness and pain. He exhibits no bony tenderness and no spasm. Arms:       Right lower leg: No edema. Left lower leg: No edema. Skin:     General: Skin is warm and dry. Neurological:      General: No focal deficit present. Mental Status: He is alert and oriented to person, place, and time. Psychiatric:         Attention and Perception: Attention normal.         Mood and Affect: Mood is anxious. Mood is not depressed. Speech: Speech normal.         Behavior: Behavior normal. Behavior is cooperative. Cognition and Memory: Cognition and memory normal.         Assessment:       Diagnosis Orders   1. Essential hypertension  Lipid Panel    Comprehensive Metabolic Panel, Fasting    CBC With Auto Differential   2. Anxiety  ALPRAZolam (XANAX) 1 MG tablet   3. Lumbar disc disease with radiculopathy, L5-S1 with sciatica     4. Hx of lumbar discectomy, L5-S1, with one level fusion, 11/18/16.      5. Hypercholesteremia  Lipid Panel    Comprehensive Metabolic Panel, Fasting    CBC With Auto Differential   6. Gastroesophageal reflux disease without esophagitis     7. Medication monitoring encounter  Lipid Panel    Comprehensive Metabolic Panel, Fasting    CBC With Auto Differential   8. Need for hepatitis C screening test  Hepatitis C Antibody           Plan:      Orders Placed This Encounter   Procedures    Lipid Panel     Standing Status:   Future     Standing Expiration Date:   9/28/2021     Order Specific Question:   Is Patient Fasting?/# of Hours     Answer:   12 hr fasting    Comprehensive Metabolic Panel, Fasting     Standing Status:   Future     Standing Expiration Date:   9/28/2021    CBC With Auto Differential     Standing Status:   Future     Standing Expiration Date:   9/28/2021    Hepatitis C Antibody     Standing Status:   Future     Standing Expiration Date:   9/28/2021     Medications Discontinued During This Encounter   Medication Reason    ALPRAZolam (XANAX) 1 MG tablet REORDER     Current Outpatient Medications   Medication Sig Dispense Refill    ALPRAZolam (XANAX) 1 MG tablet Take 1 tablet by mouth 3 times daily as needed for Anxiety for up to 270 days. 270 tablet 1    HYDROcodone-acetaminophen (NORCO)  MG per tablet Take 1 tablet by mouth every 8 hours as needed for Pain for up to 30 days.  Intended supply: 30 days 90 tablet 0    hydrOXYzine (ATARAX) 25 MG tablet Take 1 tablet by mouth 3 times daily as needed for Itching 120 tablet 2    tadalafil (CIALIS) 20 MG tablet Take 1 tablet by mouth as needed for Erectile Dysfunction 30 tablet 3    cilostazol (PLETAL) 100 MG tablet Take one tablet by mouth twice daily, 30min before or 2hrs after breakfast/dinner meal .      lisinopril (PRINIVIL;ZESTRIL) 20 MG tablet Take 1 tablet by mouth 2 times daily 180 tablet 3    atorvastatin (LIPITOR) 40 MG tablet Take 1 tablet by mouth nightly 90 tablet 3    omeprazole (PRILOSEC) 40 MG delayed release capsule Take 1 capsule by mouth daily 30 capsule 3    CALCIUM

## 2020-09-28 NOTE — PATIENT INSTRUCTIONS
You may receive a survey regarding the care you received during your visit. Your input is valuable to us. We encourage you to complete and return your survey. We hope you will choose us in the future for your healthcare needs. Continue present medications. Get labs this week. Use Ibuprofen or Aleve as needed for shoulder pain.

## 2020-09-30 ENCOUNTER — NURSE ONLY (OUTPATIENT)
Dept: LAB | Age: 55
End: 2020-09-30

## 2020-09-30 LAB
ALBUMIN SERPL-MCNC: 4.7 G/DL (ref 3.5–5.1)
ALP BLD-CCNC: 43 U/L (ref 38–126)
ALT SERPL-CCNC: 43 U/L (ref 11–66)
ANION GAP SERPL CALCULATED.3IONS-SCNC: 12 MEQ/L (ref 8–16)
AST SERPL-CCNC: 31 U/L (ref 5–40)
BASOPHILS # BLD: 0.6 %
BASOPHILS ABSOLUTE: 0 THOU/MM3 (ref 0–0.1)
BILIRUB SERPL-MCNC: 0.5 MG/DL (ref 0.3–1.2)
BUN BLDV-MCNC: 12 MG/DL (ref 7–22)
CALCIUM SERPL-MCNC: 10 MG/DL (ref 8.5–10.5)
CHLORIDE BLD-SCNC: 99 MEQ/L (ref 98–111)
CHOLESTEROL, TOTAL: 157 MG/DL (ref 100–199)
CO2: 28 MEQ/L (ref 23–33)
CREAT SERPL-MCNC: 0.5 MG/DL (ref 0.4–1.2)
EOSINOPHIL # BLD: 3.2 %
EOSINOPHILS ABSOLUTE: 0.2 THOU/MM3 (ref 0–0.4)
ERYTHROCYTE [DISTWIDTH] IN BLOOD BY AUTOMATED COUNT: 12.1 % (ref 11.5–14.5)
ERYTHROCYTE [DISTWIDTH] IN BLOOD BY AUTOMATED COUNT: 41.7 FL (ref 35–45)
GFR SERPL CREATININE-BSD FRML MDRD: > 90 ML/MIN/1.73M2
GLUCOSE FASTING: 95 MG/DL (ref 70–108)
HCT VFR BLD CALC: 43 % (ref 42–52)
HDLC SERPL-MCNC: 41 MG/DL
HEMOGLOBIN: 14.6 GM/DL (ref 14–18)
HEPATITIS C ANTIBODY: NEGATIVE
IMMATURE GRANS (ABS): 0.02 THOU/MM3 (ref 0–0.07)
IMMATURE GRANULOCYTES: 0.4 %
LDL CHOLESTEROL CALCULATED: 62 MG/DL
LYMPHOCYTES # BLD: 35.6 %
LYMPHOCYTES ABSOLUTE: 1.9 THOU/MM3 (ref 1–4.8)
MCH RBC QN AUTO: 32.2 PG (ref 26–33)
MCHC RBC AUTO-ENTMCNC: 34 GM/DL (ref 32.2–35.5)
MCV RBC AUTO: 94.7 FL (ref 80–94)
MONOCYTES # BLD: 8.3 %
MONOCYTES ABSOLUTE: 0.4 THOU/MM3 (ref 0.4–1.3)
NUCLEATED RED BLOOD CELLS: 0 /100 WBC
PLATELET # BLD: 195 THOU/MM3 (ref 130–400)
PMV BLD AUTO: 10.1 FL (ref 9.4–12.4)
POTASSIUM SERPL-SCNC: 4.5 MEQ/L (ref 3.5–5.2)
RBC # BLD: 4.54 MILL/MM3 (ref 4.7–6.1)
SEG NEUTROPHILS: 51.9 %
SEGMENTED NEUTROPHILS ABSOLUTE COUNT: 2.8 THOU/MM3 (ref 1.8–7.7)
SODIUM BLD-SCNC: 139 MEQ/L (ref 135–145)
TOTAL PROTEIN: 7.5 G/DL (ref 6.1–8)
TRIGL SERPL-MCNC: 270 MG/DL (ref 0–199)
WBC # BLD: 5.3 THOU/MM3 (ref 4.8–10.8)

## 2020-10-01 ENCOUNTER — TELEPHONE (OUTPATIENT)
Dept: FAMILY MEDICINE CLINIC | Age: 55
End: 2020-10-01

## 2020-10-01 RX ORDER — CYCLOBENZAPRINE HCL 10 MG
10 TABLET ORAL 3 TIMES DAILY PRN
Qty: 30 TABLET | Refills: 0 | Status: SHIPPED | OUTPATIENT
Start: 2020-10-01 | End: 2021-04-16 | Stop reason: SDUPTHER

## 2020-10-01 NOTE — TELEPHONE ENCOUNTER
Pt called office stating he was seen recently and a muscle relaxer was discussed, but never prescribed. Pt is asking for this to be sent to [facility]. If no call back, he will check with the pharmacy after noon. Please advise.

## 2020-10-01 NOTE — RESULT ENCOUNTER NOTE
Hepatitis C screen is negative. Lipid values show triglycerides are up a bit which means decreasing some of the carbohydrates in his diet. Blood count values are slightly abnormal are not of clinical significance. Everything else looks good.

## 2020-10-21 RX ORDER — HYDROCODONE BITARTRATE AND ACETAMINOPHEN 10; 325 MG/1; MG/1
1 TABLET ORAL EVERY 8 HOURS PRN
Qty: 90 TABLET | Refills: 0 | Status: SHIPPED | OUTPATIENT
Start: 2020-10-21 | End: 2020-11-23 | Stop reason: SDUPTHER

## 2020-10-27 ENCOUNTER — TELEPHONE (OUTPATIENT)
Dept: FAMILY MEDICINE CLINIC | Age: 55
End: 2020-10-27

## 2020-10-27 NOTE — TELEPHONE ENCOUNTER
Patient calling with c/o white spots on back of throat x 1 week. Denies sore throat or pain. Requesting Rx to [facility].   Please advise

## 2020-10-27 NOTE — TELEPHONE ENCOUNTER
If there is no sore throat or pain with those white spots, they are most likely concretions which are not infectious. He would need to have physical evaluation to warrant any medication. If these bother him, he can go to urgent care or try to make an appointment with the office.

## 2020-11-02 RX ORDER — LISINOPRIL 40 MG/1
TABLET ORAL
Qty: 90 TABLET | Refills: 1 | Status: SHIPPED | OUTPATIENT
Start: 2020-11-02 | End: 2021-03-23 | Stop reason: SDUPTHER

## 2020-11-22 ENCOUNTER — PATIENT MESSAGE (OUTPATIENT)
Dept: FAMILY MEDICINE CLINIC | Age: 55
End: 2020-11-22

## 2020-11-23 RX ORDER — ATORVASTATIN CALCIUM 40 MG/1
TABLET, FILM COATED ORAL
Qty: 90 TABLET | Refills: 3 | Status: SHIPPED | OUTPATIENT
Start: 2020-11-23 | End: 2021-11-22 | Stop reason: SDUPTHER

## 2020-11-23 RX ORDER — HYDROCODONE BITARTRATE AND ACETAMINOPHEN 10; 325 MG/1; MG/1
1 TABLET ORAL EVERY 8 HOURS PRN
Qty: 90 TABLET | Refills: 0 | Status: SHIPPED | OUTPATIENT
Start: 2020-11-23 | End: 2020-12-18 | Stop reason: SDUPTHER

## 2020-11-23 NOTE — TELEPHONE ENCOUNTER
From: Lneny Morin. To: Shoshana Martinez MD  Sent: 11/22/2020 12:20 PM EST  Subject: Prescription Question    I would like to refill my Center . [facility] on [address] [place] [address] [address], [address] [place], [place].

## 2020-12-07 ENCOUNTER — TELEPHONE (OUTPATIENT)
Dept: FAMILY MEDICINE CLINIC | Age: 55
End: 2020-12-07

## 2020-12-07 NOTE — TELEPHONE ENCOUNTER
Spoke with pt and he voiced understanding. Pt requested an appt 12/30/2020 for eval of this, declined sooner appt.

## 2020-12-07 NOTE — TELEPHONE ENCOUNTER
Patient is calling in for possible appt or prescription. He said for about a month now he has had 2 white spots on the back of his throat. He said they do not hurt, no sore throat, no cough, no fever or any other symptoms. He though maybe he could just get an antibiotic sent to [facility]? Or would he need vv? Please call him to advise.

## 2020-12-07 NOTE — TELEPHONE ENCOUNTER
In the absence of any other symptoms, that is not an indication for antibiotics. The most likely concretions in the tonsils which come from food particles. Unless there is pain, redness, swelling or fever, no antibiotics are needed. He can certainly make an appointment or go to urgent care for evaluation if needed.

## 2020-12-18 ENCOUNTER — PATIENT MESSAGE (OUTPATIENT)
Dept: FAMILY MEDICINE CLINIC | Age: 55
End: 2020-12-18

## 2020-12-18 RX ORDER — HYDROCODONE BITARTRATE AND ACETAMINOPHEN 10; 325 MG/1; MG/1
1 TABLET ORAL EVERY 8 HOURS PRN
Qty: 90 TABLET | Refills: 0 | Status: SHIPPED | OUTPATIENT
Start: 2020-12-18 | End: 2021-01-25 | Stop reason: SDUPTHER

## 2020-12-18 NOTE — TELEPHONE ENCOUNTER
From: Olena Hummel. To: Sheryle Loron, MD  Sent: 12/18/2020 9:18 AM EST  Subject: Prescription Question    I would like to refill my Leiter . [facility] on [address] [place] [address] Rd, [address] .[place], [place].

## 2020-12-30 ENCOUNTER — OFFICE VISIT (OUTPATIENT)
Dept: FAMILY MEDICINE CLINIC | Age: 55
End: 2020-12-30
Payer: COMMERCIAL

## 2020-12-30 VITALS
RESPIRATION RATE: 15 BRPM | SYSTOLIC BLOOD PRESSURE: 136 MMHG | WEIGHT: 230.4 LBS | BODY MASS INDEX: 28.05 KG/M2 | OXYGEN SATURATION: 97 % | HEART RATE: 91 BPM | DIASTOLIC BLOOD PRESSURE: 82 MMHG | TEMPERATURE: 97.5 F

## 2020-12-30 PROCEDURE — 99213 OFFICE O/P EST LOW 20 MIN: CPT | Performed by: FAMILY MEDICINE

## 2020-12-30 RX ORDER — EMOLLIENT COMBINATION NO.32
EMULSION, EXTENDED RELEASE TOPICAL
COMMUNITY
Start: 2020-11-19

## 2020-12-30 RX ORDER — OMEPRAZOLE 20 MG/1
CAPSULE, DELAYED RELEASE ORAL
COMMUNITY
Start: 2020-11-16

## 2020-12-30 ASSESSMENT — ENCOUNTER SYMPTOMS
GASTROINTESTINAL NEGATIVE: 1
RESPIRATORY NEGATIVE: 1

## 2020-12-30 NOTE — PROGRESS NOTES
Visit Information    Have you changed or started any medications since your last visit including any over-the-counter medicines, vitamins, or herbal medicines? no   Are you having any side effects from any of your medications? -  no  Have you stopped taking any of your medications? Is so, why? -  no    Have you seen any other physician or provider since your last visit? No  Have you had any other diagnostic tests since your last visit? No  Have you been seen in the emergency room and/or had an admission to a hospital since we last saw you? No  Have you had your routine dental cleaning in the past 6 months? yes -     Have you activated your [employer] account? If not, what are your barriers?  Yes     Patient Care Team:  Doris Ladd,  as PCP - General (Family Medicine)  Doris Ladd,  as PCP - [facility]  Alycia Sauer, DO as Consulting Physician (Neurology)    Medical History Review  Past Medical, Family, and Social History reviewed and does not contribute to the patient presenting condition    Health Maintenance   Topic Date Due    HIV screen  12/30/1978    DTaP/Tdap/Td vaccine (1 - Tdap) 12/30/1982    Shingles Vaccine (1 of 2) 12/30/2013    Low dose CT lung screening  12/30/2018    A1C test (Diabetic or Prediabetic)  05/05/2019    Lipid screen  09/30/2021    Potassium monitoring  09/30/2021    Creatinine monitoring  09/30/2021    Colon cancer screen colonoscopy  10/21/2028    Flu vaccine  Completed    Hepatitis C screen  Completed    Hepatitis A vaccine  Aged Out    Hepatitis B vaccine  Aged Out    Hib vaccine  Aged Out    Meningococcal (ACWY) vaccine  Aged Out    Pneumococcal 0-64 years Vaccine  Aged Out

## 2020-12-30 NOTE — PROGRESS NOTES
Subjective:      Patient ID: Zack Romero is a 60 y.o. male. HPI:    Chief Complaint   Patient presents with    Other     tonsil stone discuss treatment      Pt here for 2 white spots on the back of his tonsils for the last 2 mos. Tried physical debridement with no relief. Patient Active Problem List   Diagnosis    HTN (hypertension)    Hypercholesteremia    Insomnia    Spine pain,  L-S- chronic pain    Varicose veins of both lower extremities    GERD (gastroesophageal reflux disease)    ED (erectile dysfunction)    Allergic rhinitis    Itchy skin    Medication monitoring encounter    Chronic cervical pain with radicular shoulder pain.  Chronic abdominal pain, reflux/heartburn.  Bilateral knee pain, rt>lt.  Lumbar disc disease with radiculopathy, L5-S1 with sciatica    Chronic anxiety    Hx of lumbar discectomy, L5-S1, with one level fusion, 11/18/16.     Primary osteoarthritis of right knee, bone on bone    Pain of right hip joint    Ventral hernia without obstruction or gangrene    Rectal bleeding    Acute GI bleeding    Hypovolemic shock (HCC)    Hemorrhagic shock (HCC)    Opioid dependence with opioid-induced sexual dysfunction ([address]    Acquired buried penis    History of operative procedure on lumbosacral spinal structure    Intermittent claudication ([address]    PVD (peripheral vascular disease) ([address]     Past Surgical History:   Procedure Laterality Date    BACK SURGERY  11/18/2016    L-5 and bulging disc repair 2 rods and 4 screws with fusion at [facility] with Dr. Alexander Kraft  2002    COLONOSCOPY  10/13/2017    Dr Amye Koyanagi  2014    Dr Amye Koyanagi  2013    COLONOSCOPY Left 10/21/2018    COLONOSCOPY CONTROL HEMORRHAGE performed by Marcia Hyatt MD at [address] [facility]  09/28/2018    banding- [facility] Right 07/18/2019    knee partial replacement CALCIUM CARBONATE-VITAMIN D PO Take 2 tablets by mouth daily Calcium carbonate 600 mg tablet (total from taking 2 tablets is 1200 mg), unsure of vitamin D dose   Yes Historical Provider, MD   B Complex Vitamins (VITAMIN B COMPLEX PO) Take 1 tablet by mouth daily One daily    Yes Historical Provider, MD   Multiple Vitamins-Minerals (MULTIVITAMIN & MINERAL PO) Take 1 tablet by mouth daily One daily--one bottle refill for one year    Yes Historical Provider, MD   omeprazole (PRILOSEC) 20 MG delayed release capsule  11/16/20   Historical Provider, MD   Dermatological Products, Misc. (EPICERAM) EMUL APPLY TO THE AFFECTED AREA(S) DAILY 11/19/20   Historical Provider, MD         Review of Systems   Constitutional: Negative. HENT: Negative. White lesion to back of throat     Respiratory: Negative. Cardiovascular: Negative. Gastrointestinal: Negative. Musculoskeletal: Negative. All other systems reviewed and are negative. Objective:   Physical Exam  Vitals signs and nursing note reviewed. Constitutional:       General: He is not in acute distress. Appearance: Normal appearance. He is well-developed. HENT:      Head: Normocephalic and atraumatic. Right Ear: Tympanic membrane normal.      Left Ear: Tympanic membrane normal.      Mouth/Throat:     Eyes:      Conjunctiva/sclera: Conjunctivae normal.   Neck:      Musculoskeletal: Neck supple. Cardiovascular:      Rate and Rhythm: Normal rate and regular rhythm. Heart sounds: Normal heart sounds. No murmur. Pulmonary:      Effort: Pulmonary effort is normal.      Breath sounds: Normal breath sounds. No wheezing, rhonchi or rales. Abdominal:      General: There is no distension. Skin:     General: Skin is warm and dry. Findings: No rash (on exposed surfaces). Neurological:      General: No focal deficit present. Mental Status: He is alert.    Psychiatric:         Attention and Perception: Attention normal. Mood and Affect: Mood normal.         Speech: Speech normal.         Behavior: Behavior normal. Behavior is cooperative. Thought Content: Thought content normal.         Judgment: Judgment normal.         Assessment:       Diagnosis Orders   1.  Oral cyst  Jermaine Kaye MD, Otolaryngology, [facility]           Plan:      -  Refer to ENT  -  RTO prmatilde Gates DO

## 2020-12-31 ENCOUNTER — TELEPHONE (OUTPATIENT)
Dept: ENT CLINIC | Age: 55
End: 2020-12-31

## 2020-12-31 NOTE — TELEPHONE ENCOUNTER
Patient referred to us for oral cyst. Please review chart and advise as to how soon patient needs to be seen.

## 2021-01-25 ENCOUNTER — PATIENT MESSAGE (OUTPATIENT)
Dept: FAMILY MEDICINE CLINIC | Age: 56
End: 2021-01-25

## 2021-01-25 DIAGNOSIS — M51.16 LUMBAR DISC DISEASE WITH RADICULOPATHY: ICD-10-CM

## 2021-01-25 DIAGNOSIS — M15.9 PRIMARY OSTEOARTHRITIS INVOLVING MULTIPLE JOINTS: ICD-10-CM

## 2021-01-25 DIAGNOSIS — Z98.890 HX OF LUMBAR DISCECTOMY: ICD-10-CM

## 2021-01-25 RX ORDER — HYDROCODONE BITARTRATE AND ACETAMINOPHEN 10; 325 MG/1; MG/1
1 TABLET ORAL EVERY 8 HOURS PRN
Qty: 90 TABLET | Refills: 0 | Status: SHIPPED | OUTPATIENT
Start: 2021-01-25 | End: 2021-03-01 | Stop reason: SDUPTHER

## 2021-01-25 NOTE — TELEPHONE ENCOUNTER
From: Annamaria Brown. To: Stephen Lino DO  Sent: 1/25/2021 10:38 AM EST  Subject: Prescription Question    I would like to refill my Tillson [id number]. Ralf Bach is who I have been seeing and I would like this refill my Tillson through my mail in carrier which should be in my files as [facility].     Thank you,  Shahrzad Mcgowan

## 2021-03-01 ENCOUNTER — TELEPHONE (OUTPATIENT)
Dept: FAMILY MEDICINE CLINIC | Age: 56
End: 2021-03-01

## 2021-03-01 DIAGNOSIS — Z98.890 HX OF LUMBAR DISCECTOMY: ICD-10-CM

## 2021-03-01 DIAGNOSIS — M51.16 LUMBAR DISC DISEASE WITH RADICULOPATHY: ICD-10-CM

## 2021-03-01 DIAGNOSIS — M15.9 PRIMARY OSTEOARTHRITIS INVOLVING MULTIPLE JOINTS: ICD-10-CM

## 2021-03-01 RX ORDER — INDOMETHACIN 50 MG/1
50 CAPSULE ORAL 3 TIMES DAILY PRN
Qty: 15 CAPSULE | Refills: 0 | Status: SHIPPED | OUTPATIENT
Start: 2021-03-01 | End: 2021-03-29

## 2021-03-01 RX ORDER — HYDROCODONE BITARTRATE AND ACETAMINOPHEN 10; 325 MG/1; MG/1
1 TABLET ORAL EVERY 8 HOURS PRN
Qty: 90 TABLET | Refills: 0 | Status: SHIPPED | OUTPATIENT
Start: 2021-03-01 | End: 2021-03-29 | Stop reason: SDUPTHER

## 2021-03-01 NOTE — TELEPHONE ENCOUNTER
Pt called stating he has a gout flare up in his right great toe for past 4 days. Asking if you will send a script to [facility]. Pt also asking for his Norco  mg refilled to [facility]. If no call back pt will check with [facility] after 12 noon today. Please advise.

## 2021-03-23 RX ORDER — LISINOPRIL 40 MG/1
TABLET ORAL
Qty: 90 TABLET | Refills: 1 | Status: SHIPPED | OUTPATIENT
Start: 2021-03-23 | End: 2021-11-22

## 2021-03-23 NOTE — TELEPHONE ENCOUNTER
Requested Prescriptions     Pending Prescriptions Disp Refills    lisinopril (PRINIVIL;ZESTRIL) 40 MG tablet 90 tablet 1     Sig: TAKE 1 TABLET DAILY

## 2021-03-29 ENCOUNTER — OFFICE VISIT (OUTPATIENT)
Dept: FAMILY MEDICINE CLINIC | Age: 56
End: 2021-03-29
Payer: COMMERCIAL

## 2021-03-29 VITALS
RESPIRATION RATE: 20 BRPM | BODY MASS INDEX: 27.53 KG/M2 | TEMPERATURE: 97.2 F | HEART RATE: 104 BPM | WEIGHT: 226.2 LBS | SYSTOLIC BLOOD PRESSURE: 124 MMHG | DIASTOLIC BLOOD PRESSURE: 60 MMHG

## 2021-03-29 DIAGNOSIS — E78.00 HYPERCHOLESTEREMIA: ICD-10-CM

## 2021-03-29 DIAGNOSIS — M51.16 LUMBAR DISC DISEASE WITH RADICULOPATHY: ICD-10-CM

## 2021-03-29 DIAGNOSIS — Z00.00 WELL ADULT EXAM: Primary | ICD-10-CM

## 2021-03-29 DIAGNOSIS — M15.9 PRIMARY OSTEOARTHRITIS INVOLVING MULTIPLE JOINTS: ICD-10-CM

## 2021-03-29 DIAGNOSIS — I10 ESSENTIAL HYPERTENSION: ICD-10-CM

## 2021-03-29 DIAGNOSIS — F41.9 ANXIETY: ICD-10-CM

## 2021-03-29 DIAGNOSIS — Z98.890 HX OF LUMBAR DISCECTOMY: ICD-10-CM

## 2021-03-29 PROCEDURE — 99396 PREV VISIT EST AGE 40-64: CPT | Performed by: NURSE PRACTITIONER

## 2021-03-29 RX ORDER — HYDROCODONE BITARTRATE AND ACETAMINOPHEN 10; 325 MG/1; MG/1
1 TABLET ORAL EVERY 8 HOURS PRN
Qty: 90 TABLET | Refills: 0 | Status: SHIPPED | OUTPATIENT
Start: 2021-04-05 | End: 2021-05-12 | Stop reason: SDUPTHER

## 2021-03-29 RX ORDER — INDOMETHACIN 50 MG/1
50 CAPSULE ORAL 3 TIMES DAILY PRN
Qty: 15 CAPSULE | Refills: 0 | Status: SHIPPED | OUTPATIENT
Start: 2021-03-29 | End: 2022-03-10 | Stop reason: SDUPTHER

## 2021-03-29 ASSESSMENT — PATIENT HEALTH QUESTIONNAIRE - PHQ9
SUM OF ALL RESPONSES TO PHQ QUESTIONS 1-9: 0
SUM OF ALL RESPONSES TO PHQ QUESTIONS 1-9: 0
SUM OF ALL RESPONSES TO PHQ9 QUESTIONS 1 & 2: 0
SUM OF ALL RESPONSES TO PHQ QUESTIONS 1-9: 0

## 2021-03-29 NOTE — PROGRESS NOTES
Per report from trans RN (ANURADHA Ramírez RN), baby's BG was 105 at 0545 this morning. Trans RN to manually enter result.    Subjective:      Patient ID: Christiano Perales. 1965 is a 60 y.o. male here for evaluation. Chief Complaint   Patient presents with    6 Month Follow-Up       Patient Active Problem List   Diagnosis    HTN (hypertension)    Hypercholesteremia    Insomnia    Spine pain,  L-S- chronic pain    Varicose veins of both lower extremities    GERD (gastroesophageal reflux disease)    ED (erectile dysfunction)    Allergic rhinitis    Itchy skin    Medication monitoring encounter    Chronic cervical pain with radicular shoulder pain.  Chronic abdominal pain, reflux/heartburn.  Bilateral knee pain, rt>lt.  Lumbar disc disease with radiculopathy, L5-S1 with sciatica    Chronic anxiety    Hx of lumbar discectomy, L5-S1, with one level fusion, 11/18/16.  Primary osteoarthritis of right knee, bone on bone    Pain of right hip joint    Ventral hernia without obstruction or gangrene    Rectal bleeding    Acute GI bleeding    Hypovolemic shock (HCC)    Hemorrhagic shock (HCC)    Opioid dependence with opioid-induced sexual dysfunction ([address]    Acquired buried penis    History of operative procedure on lumbosacral spinal structure    Intermittent claudication ([address]    PVD (peripheral vascular disease) ([address]       COLONOSCOPY - 2018    Back surgery in 2020 and 2016 with hardware. He is on Norco  QID. Chronic back pain. Chronic joint pains. Hx of partial knee replacement.      Anxiety stable on use of Xanax 1 mg up to 3 times daily. Usually take no more than BID and tries to get away with 1 per day. Denies CP, SOB or chest tightness    Vitals:    03/29/21 1528   BP: 124/60   Pulse: 104   Resp: 20   Temp: 97.2 °F (36.2 °C)        On Lisinopril 40 mg.  BP wnl. BP Readings from Last 3 Encounters:   03/29/21 124/60   12/30/20 136/82   09/28/20 120/72       Lipitor 40 mg. Due for annual labs.      Lab Results   Component Value Date    LABA1C 5.8 05/05/2018     Lab Results (Flulaval, Fluarix) 10/23/2018    Influenza, Quadv, IM, PF (6 mo and older Fluzone, Flulaval, Fluarix, and 3 yrs and older Afluria) 10/28/2017, 10/20/2019, 10/25/2020       Review of Systems   Constitutional: Negative for chills and fever. HENT: Negative. Respiratory: Negative for cough and shortness of breath. Cardiovascular: Negative for chest pain. Gastrointestinal: Negative for abdominal pain and nausea. Musculoskeletal: Positive for arthralgias and back pain. Skin: Negative for rash. Neurological: Negative for dizziness, light-headedness and headaches. Psychiatric/Behavioral: The patient is nervous/anxious. Objective:   Physical Exam  Constitutional:       General: He is not in acute distress. Eyes:      Pupils: Pupils are equal, round, and reactive to light. Neck:      Musculoskeletal: Normal range of motion and neck supple. Cardiovascular:      Rate and Rhythm: Normal rate and regular rhythm. Heart sounds: No murmur. Pulmonary:      Effort: Pulmonary effort is normal.      Breath sounds: Normal breath sounds. No wheezing. Abdominal:      General: Bowel sounds are normal. There is no distension. Palpations: Abdomen is soft. Tenderness: There is no abdominal tenderness. Musculoskeletal:      Right knee: Tenderness found. Left knee: Tenderness found. Lumbar back: He exhibits decreased range of motion and pain. Skin:     General: Skin is warm and dry. Findings: No rash. Psychiatric:         Mood and Affect: Mood is anxious. Judgment: Judgment is impulsive. Assessment:       Diagnosis Orders   1. Well adult exam  PSA screening    Hemoglobin A1C    LDL Cholesterol, Direct    Comprehensive Metabolic Panel, Fasting    Uric Acid   2. Lumbar disc disease with radiculopathy, L5-S1 with sciatica  HYDROcodone-acetaminophen (NORCO)  MG per tablet   3. Hx of lumbar discectomy, L5-S1, with one level fusion, 11/18/16. tablets is 1200 mg), unsure of vitamin D dose      B Complex Vitamins (VITAMIN B COMPLEX PO) Take 1 tablet by mouth daily One daily       Multiple Vitamins-Minerals (MULTIVITAMIN & MINERAL PO) Take 1 tablet by mouth daily One daily--one bottle refill for one year        No current facility-administered medications for this visit.

## 2021-03-29 NOTE — PROGRESS NOTES
monitoring  09/30/2021    Colon cancer screen colonoscopy  10/21/2028    Flu vaccine  Completed    Hepatitis C screen  Completed    Hepatitis A vaccine  Aged Out    Hepatitis B vaccine  Aged Out    Hib vaccine  Aged Out    Meningococcal (ACWY) vaccine  Aged Out    Pneumococcal 0-64 years Vaccine  Aged Out

## 2021-03-30 ASSESSMENT — ENCOUNTER SYMPTOMS
NAUSEA: 0
ABDOMINAL PAIN: 0
SHORTNESS OF BREATH: 0
BACK PAIN: 1
COUGH: 0

## 2021-04-03 ENCOUNTER — NURSE ONLY (OUTPATIENT)
Dept: LAB | Age: 56
End: 2021-04-03

## 2021-04-03 DIAGNOSIS — Z00.00 WELL ADULT EXAM: ICD-10-CM

## 2021-04-03 LAB
ALBUMIN SERPL-MCNC: 4.9 G/DL (ref 3.5–5.1)
ALP BLD-CCNC: 45 U/L (ref 38–126)
ALT SERPL-CCNC: 61 U/L (ref 11–66)
ANION GAP SERPL CALCULATED.3IONS-SCNC: 14 MEQ/L (ref 8–16)
AST SERPL-CCNC: 47 U/L (ref 5–40)
AVERAGE GLUCOSE: 123 MG/DL (ref 70–126)
BILIRUB SERPL-MCNC: 0.8 MG/DL (ref 0.3–1.2)
BUN BLDV-MCNC: 11 MG/DL (ref 7–22)
CALCIUM SERPL-MCNC: 9.6 MG/DL (ref 8.5–10.5)
CHLORIDE BLD-SCNC: 99 MEQ/L (ref 98–111)
CO2: 27 MEQ/L (ref 23–33)
CREAT SERPL-MCNC: 0.7 MG/DL (ref 0.4–1.2)
GFR SERPL CREATININE-BSD FRML MDRD: > 90 ML/MIN/1.73M2
GLUCOSE FASTING: 108 MG/DL (ref 70–108)
HBA1C MFR BLD: 6.1 % (ref 4.4–6.4)
LDL CHOLESTEROL DIRECT: 79.36 MG/DL
POTASSIUM SERPL-SCNC: 5.2 MEQ/L (ref 3.5–5.2)
PROSTATE SPECIFIC ANTIGEN: 0.74 NG/ML (ref 0–1)
SODIUM BLD-SCNC: 140 MEQ/L (ref 135–145)
TOTAL PROTEIN: 7.7 G/DL (ref 6.1–8)
URIC ACID: 10.3 MG/DL (ref 3.7–7)

## 2021-04-05 DIAGNOSIS — E79.0 ELEVATED URIC ACID IN BLOOD: Primary | ICD-10-CM

## 2021-04-05 RX ORDER — ALLOPURINOL 300 MG/1
300 TABLET ORAL DAILY
Qty: 90 TABLET | Refills: 1 | Status: SHIPPED | OUTPATIENT
Start: 2021-04-05 | End: 2022-03-21 | Stop reason: DRUGHIGH

## 2021-04-16 RX ORDER — CYCLOBENZAPRINE HCL 10 MG
10 TABLET ORAL 3 TIMES DAILY PRN
Qty: 30 TABLET | Refills: 0 | Status: SHIPPED | OUTPATIENT
Start: 2021-04-16 | End: 2021-04-26

## 2021-04-16 NOTE — TELEPHONE ENCOUNTER
Ramon Ford. called requesting a refill on the following medications:  Requested Prescriptions     Pending Prescriptions Disp Refills    cyclobenzaprine (FLEXERIL) 10 MG tablet 30 tablet 0     Sig: Take 1 tablet by mouth 3 times daily as needed for Muscle spasms     Pharmacy verified: [facility]      Date of last visit: 3/29/2021  Date of next visit (if applicable): 7/31/9464      *Patient could not verify mg

## 2021-05-24 ENCOUNTER — OFFICE VISIT (OUTPATIENT)
Dept: FAMILY MEDICINE CLINIC | Age: 56
End: 2021-05-24
Payer: COMMERCIAL

## 2021-05-24 VITALS
RESPIRATION RATE: 20 BRPM | BODY MASS INDEX: 26.4 KG/M2 | HEART RATE: 104 BPM | WEIGHT: 216.9 LBS | SYSTOLIC BLOOD PRESSURE: 94 MMHG | DIASTOLIC BLOOD PRESSURE: 60 MMHG

## 2021-05-24 DIAGNOSIS — M76.32 ILIOTIBIAL BAND SYNDROME OF LEFT SIDE: ICD-10-CM

## 2021-05-24 DIAGNOSIS — R05.9 COUGH: Primary | ICD-10-CM

## 2021-05-24 DIAGNOSIS — R09.82 PND (POST-NASAL DRIP): ICD-10-CM

## 2021-05-24 PROCEDURE — 99213 OFFICE O/P EST LOW 20 MIN: CPT | Performed by: NURSE PRACTITIONER

## 2021-05-24 RX ORDER — PREDNISONE 20 MG/1
20 TABLET ORAL 2 TIMES DAILY
Qty: 10 TABLET | Refills: 0 | Status: SHIPPED | OUTPATIENT
Start: 2021-05-24 | End: 2021-05-29

## 2021-05-24 SDOH — ECONOMIC STABILITY: FOOD INSECURITY: WITHIN THE PAST 12 MONTHS, THE FOOD YOU BOUGHT JUST DIDN'T LAST AND YOU DIDN'T HAVE MONEY TO GET MORE.: NEVER TRUE

## 2021-05-24 ASSESSMENT — ENCOUNTER SYMPTOMS
COUGH: 1
ABDOMINAL PAIN: 0
RHINORRHEA: 1
NAUSEA: 0
SHORTNESS OF BREATH: 0

## 2021-05-24 NOTE — PATIENT INSTRUCTIONS
You may receive a survey regarding the care you received during your visit. Your input is valuable to us. We encourage you to complete and return your survey. We hope you will choose us in the future for your healthcare needs. Patient Education        Iliotibial Band Syndrome: Care Instructions  Your Care Instructions  Iliotibial band syndrome is pain and swelling of the iliotibial band (also called the IT band). This is a band of tissue that runs down the outside of your thigh. It connects the side of your hip to the side of your knee. It helps keep your knee and hip stable and in their normal position. When you have IT band syndrome, you may feel pain on the outside of your hip. It happens as your IT band snaps back and forth over the bony point of your hip. Sometimes you may only feel pain on the outside of your knee. You can get this syndrome if the IT band is too tight or if you do certain activities over and over that put pressure on your hip or knee. This is a common problem in runners, cyclists, and people who do other aerobic activities. IT band syndrome is treated with rest and medicines. These relieve swelling and pain. Physical therapy is also used. It may include stretching or doing certain exercises that can help strengthen your IT band and hip muscles. Sometimes a steroid shot is given to help relieve pain at the spot that is most sore. Follow-up care is a key part of your treatment and safety. Be sure to make and go to all appointments, and call your doctor if you are having problems. It's also a good idea to know your test results and keep a list of the medicines you take. How can you care for yourself at home? · Stay at a healthy weight. Being overweight puts extra strain on your hip and knee joints. · Take pain medicines exactly as directed. ? If the doctor gave you a prescription medicine for pain, take it as prescribed.   ? If you are not taking a prescription pain medicine, ask your doctor if you can take an over-the-counter medicine. · Talk to your doctor or physical therapist about exercises that will help ease hip and knee pain. ? Stretch before you exercise. This can help prevent stiffness and injury. You can try gentle forms of yoga to help keep your joints and muscles flexible. ? Use exercises that are less stressful on the joints. Walk instead of jog. Ride a stationary bike with little resistance. Or you can swim or try water exercise. ? Do exercises that can help strengthen your IT band and hip muscles. Your doctor or physical therapist can tell you what kind of exercises are best for you. He or she can help you learn the right way to do the exercises. When should you call for help? Watch closely for changes in your health, and be sure to contact your doctor if:    · You have pain in your hip or knee that doesn't go away.     · You do not get better as expected. Where can you learn more? Go to https://Disqus.[email or web address] org and sign in to your [employer] account. Enter L449 in the Merkle box to learn more about \"Iliotibial Band Syndrome: Care Instructions. \"     If you do not have an account, please click on the \"Sign Up Now\" link. Current as of: November 16, 2020               Content Version: 12.8  © 2006-2021 [employer]. Care instructions adapted under license by [facility] ([facility]). If you have questions about a medical condition or this instruction, always ask your healthcare professional. [place] [address] any warranty or liability for your use of this information. Patient Education        Iliotibial Band Syndrome: Exercises  Introduction  Here are some examples of exercises for you to try. The exercises may be suggested for a condition or for rehabilitation. Start each exercise slowly. Ease off the exercises if you start to have pain.   You will be told when to start these exercises and which ones will work best for you. How to do the exercises  Iliotibial band stretch   1. Lean sideways against a wall. If you are not steady on your feet, hold on to a chair or counter. 2. Stand on the leg with the affected hip, with that leg close to the wall. Then cross your other leg in front of it. 3. Let your affected hip drop out to the side of your body and against the wall. Then lean away from your affected hip until you feel a stretch. 4. Hold the stretch for 15 to 30 seconds. 5. Repeat 2 to 4 times. Piriformis stretch   1. Lie on your back with your legs straight. 2. Lift your affected leg and bend your knee. With your opposite hand, reach across your body, and then gently pull your knee toward your opposite shoulder. 3. Hold the stretch for 15 to 30 seconds. 4. Repeat 2 to 4 times. Hamstring wall stretch   1. Lie on your back in a doorway, with your good leg through the open door. 2. Slide your affected leg up the wall to straighten your knee. You should feel a gentle stretch down the back of your leg. 3. Hold the stretch for at least 1 minute to begin. Then try to lengthen the time you hold the stretch to as long as 6 minutes. 4. Repeat 2 to 4 times. 5. If you do not have a place to do this exercise in a doorway, there is another way to do it:  6. Lie on your back, and bend the knee of your affected leg. 7. Loop a towel under the ball and toes of that foot, and hold the ends of the towel in your hands. 8. Straighten your knee, and slowly pull back on the towel. You should feel a gentle stretch down the back of your leg. 9. Hold the stretch for 15 to 30 seconds. Or even better, hold the stretch for 1 minute if you can. 10. Repeat 2 to 4 times. 1. Do not arch your back. 2. Do not bend either knee. 3. Keep one heel touching the floor and the other heel touching the wall. Do not point your toes. Follow-up care is a key part of your treatment and safety.  Be sure to make and go to all appointments, and call your doctor if you are having problems. It's also a good idea to know your test results and keep a list of the medicines you take. Where can you learn more? Go to https://SkillBoostpeMiiix.Workfolio. org and sign in to your [employer] account. Enter P252 in the Betyah box to learn more about \"Iliotibial Band Syndrome: Exercises. \"     If you do not have an account, please click on the \"Sign Up Now\" link. Current as of: November 16, 2020               Content Version: 12.8  © 1932-2327 [employer] Inc[employer]. Care instructions adapted under license by [facility] ([facility]). If you have questions about a medical condition or this instruction, always ask your healthcare professional. [address] any warranty or liability for your use of this information.

## 2021-05-24 NOTE — PROGRESS NOTES
Visit Information    Have you changed or started any medications since your last visit including any over-the-counter medicines, vitamins, or herbal medicines? yes - see list   Are you having any side effects from any of your medications? -  no  Have you stopped taking any of your medications? Is so, why? -  no    Have you seen any other physician or provider since your last visit? Yes - Records Obtained  Have you had any other diagnostic tests since your last visit? Yes - Records Obtained  Have you been seen in the emergency room and/or had an admission to a hospital since we last saw you? No  Have you had your routine dental cleaning in the past 6 months? yes - within the past 6mo    Have you activated your [employer] account? If not, what are your barriers?  Yes     Patient Care Team:  Song Jeronimo DO as PCP - General (Family Medicine)  Song Jeronimo DO as PCP - Carlita Granados Provider  Quoc Mcintyre DO as Consulting Physician (Neurology)    Medical History Review  Past Medical, Family, and Social History reviewed and does not contribute to the patient presenting condition    Health Maintenance   Topic Date Due    COVID-19 Vaccine (1) Never done    HIV screen  Never done    DTaP/Tdap/Td vaccine (1 - Tdap) Never done    Shingles Vaccine (1 of 2) Never done    A1C test (Diabetic or Prediabetic)  04/03/2022    Lipid screen  04/03/2022    Potassium monitoring  04/03/2022    Creatinine monitoring  04/03/2022    Colon cancer screen colonoscopy  10/21/2028    Flu vaccine  Completed    Hepatitis C screen  Completed    Hepatitis A vaccine  Aged Out    Hepatitis B vaccine  Aged Out    Hib vaccine  Aged Out    Meningococcal (ACWY) vaccine  Aged Out    Pneumococcal 0-64 years Vaccine  Aged Out

## 2021-05-24 NOTE — PROGRESS NOTES
Carmen Cornejo (1965) 60 y.o. male here for evaluation of the following chief complaint(s):      HPI:  Chief Complaint   Patient presents with    Cough    Hoarse     the past week    Pharyngitis       Onset of 1 week. Hoarseness, cough and ST.  Runny nose. Comes and goes. Worse at night with coughing fits and feels congested in chest.     Denies fatigue, body ache. Overall feels well. OTC: Mucinex    Vitals:    05/24/21 1143   BP: 94/60   Pulse: 104   Resp: 20     BP Readings from Last 3 Encounters:   05/24/21 94/60   03/29/21 124/60   12/30/20 136/82       Pain on left side of hip down to knee. Knee pain on outside of knee as well. Comes and goes. Has been see [facility]. Denies joint swelling. Patient Active Problem List   Diagnosis    HTN (hypertension)    Hypercholesteremia    Insomnia    Spine pain,  L-S- chronic pain    Varicose veins of both lower extremities    GERD (gastroesophageal reflux disease)    ED (erectile dysfunction)    Allergic rhinitis    Itchy skin    Medication monitoring encounter    Chronic cervical pain with radicular shoulder pain.  Chronic abdominal pain, reflux/heartburn.  Bilateral knee pain, rt>lt.  Lumbar disc disease with radiculopathy, L5-S1 with sciatica    Chronic anxiety    Hx of lumbar discectomy, L5-S1, with one level fusion, 11/18/16.  Primary osteoarthritis of right knee, bone on bone    Pain of right hip joint    Ventral hernia without obstruction or gangrene    Rectal bleeding    Acute GI bleeding    Hypovolemic shock (HCC)    Hemorrhagic shock (HCC)    Opioid dependence with opioid-induced sexual dysfunction ([address]    Acquired buried penis    History of operative procedure on lumbosacral spinal structure    Intermittent claudication ([address]    PVD (peripheral vascular disease) (HCC)       SUBJECTIVE/OBJECTIVE:  Review of Systems   Constitutional: Negative for chills, fatigue and fever.    HENT: Positive for congestion, postnasal drip and rhinorrhea. Respiratory: Positive for cough. Negative for shortness of breath. Cardiovascular: Negative for chest pain. Gastrointestinal: Negative for abdominal pain and nausea. Musculoskeletal: Positive for arthralgias and myalgias. Skin: Negative for rash. Allergic/Immunologic: Positive for environmental allergies. Neurological: Negative for dizziness, light-headedness and headaches. Psychiatric/Behavioral: Negative. Physical Exam  Constitutional:       General: He is not in acute distress. HENT:      Nose: Mucosal edema, congestion and rhinorrhea present. Eyes:      Pupils: Pupils are equal, round, and reactive to light. Cardiovascular:      Rate and Rhythm: Normal rate and regular rhythm. Heart sounds: No murmur heard. Pulmonary:      Effort: Pulmonary effort is normal.      Breath sounds: Normal breath sounds. No wheezing. Abdominal:      General: Bowel sounds are normal. There is no distension. Palpations: Abdomen is soft. Tenderness: There is no abdominal tenderness. Musculoskeletal:         General: No tenderness. Normal range of motion. Cervical back: Normal range of motion and neck supple. Skin:     General: Skin is warm and dry. Findings: No rash. ASSESSMENT/PLAN:   Diagnosis Orders   1. Cough  predniSONE (DELTASONE) 20 MG tablet   2. PND (post-nasal drip)  predniSONE (DELTASONE) 20 MG tablet   3. Iliotibial band syndrome of left side           MDM: Orders as above - environmental allergy    Continue OTC   Stretches for #3   RTO PRN         An electronic signature was used to authenticate this note.     --Ayush Archuleta, APRN - CNP

## 2021-06-01 ENCOUNTER — TELEPHONE (OUTPATIENT)
Dept: FAMILY MEDICINE CLINIC | Age: 56
End: 2021-06-01

## 2021-06-01 DIAGNOSIS — R05.9 COUGH: Primary | ICD-10-CM

## 2021-06-01 RX ORDER — DEXTROMETHORPHAN HYDROBROMIDE AND PROMETHAZINE HYDROCHLORIDE 15; 6.25 MG/5ML; MG/5ML
5 SYRUP ORAL 4 TIMES DAILY PRN
Qty: 120 ML | Refills: 0 | Status: SHIPPED | OUTPATIENT
Start: 2021-06-01 | End: 2021-08-13 | Stop reason: SDUPTHER

## 2021-06-01 NOTE — TELEPHONE ENCOUNTER
----- Message from Justin Yen sent at 6/1/2021  9:17 AM EDT -----  Subject: Message to Provider    QUESTIONS  Information for Provider? The patient is calling today to follow up from   visit last Monday with Rodrigo Martinez. Patient states that he was given steroids and   abx to take x 5 days. The patient state that he has a lot of mucus and   cough, but he is feeling ok otherwise. He is asking to have something for   mucus and chest congestion to be sent to pharmacy [facility]. Please call   the patient to advise   ---------------------------------------------------------------------------  --------------  CALL BACK INFO  What is the best way for the office to contact you? OK to leave message on   voicemail  Preferred Call Back Phone Number? [phone]  ---------------------------------------------------------------------------  --------------  SCRIPT ANSWERS  Relationship to Patient?  Self

## 2021-06-13 ENCOUNTER — PATIENT MESSAGE (OUTPATIENT)
Dept: FAMILY MEDICINE CLINIC | Age: 56
End: 2021-06-13

## 2021-06-13 DIAGNOSIS — M51.16 LUMBAR DISC DISEASE WITH RADICULOPATHY: ICD-10-CM

## 2021-06-13 DIAGNOSIS — Z98.890 HX OF LUMBAR DISCECTOMY: ICD-10-CM

## 2021-06-13 DIAGNOSIS — M15.9 PRIMARY OSTEOARTHRITIS INVOLVING MULTIPLE JOINTS: ICD-10-CM

## 2021-06-14 RX ORDER — HYDROCODONE BITARTRATE AND ACETAMINOPHEN 10; 325 MG/1; MG/1
1 TABLET ORAL EVERY 8 HOURS PRN
Qty: 90 TABLET | Refills: 0 | Status: SHIPPED | OUTPATIENT
Start: 2021-06-14 | End: 2021-06-29 | Stop reason: SDUPTHER

## 2021-06-14 RX ORDER — HYDROCODONE BITARTRATE AND ACETAMINOPHEN 10; 325 MG/1; MG/1
1 TABLET ORAL EVERY 8 HOURS PRN
Qty: 90 TABLET | Refills: 0 | OUTPATIENT
Start: 2021-06-14 | End: 2021-07-14

## 2021-06-14 NOTE — TELEPHONE ENCOUNTER
From: Laura Jules. To: IVANNA Munoz CNP  Sent: 6/13/2021 7:13 PM EDT  Subject: Prescription Question    I would like to refill my [place] [facility] in [address] [place] [address], [address] [place] [address]

## 2021-06-29 ENCOUNTER — OFFICE VISIT (OUTPATIENT)
Dept: FAMILY MEDICINE CLINIC | Age: 56
End: 2021-06-29
Payer: COMMERCIAL

## 2021-06-29 VITALS
DIASTOLIC BLOOD PRESSURE: 74 MMHG | HEART RATE: 100 BPM | WEIGHT: 219.7 LBS | OXYGEN SATURATION: 98 % | BODY MASS INDEX: 26.74 KG/M2 | SYSTOLIC BLOOD PRESSURE: 124 MMHG | RESPIRATION RATE: 16 BRPM

## 2021-06-29 DIAGNOSIS — R73.01 IFG (IMPAIRED FASTING GLUCOSE): Primary | ICD-10-CM

## 2021-06-29 DIAGNOSIS — F41.9 ANXIETY: ICD-10-CM

## 2021-06-29 DIAGNOSIS — E79.0 ELEVATED URIC ACID IN BLOOD: ICD-10-CM

## 2021-06-29 DIAGNOSIS — M15.9 PRIMARY OSTEOARTHRITIS INVOLVING MULTIPLE JOINTS: ICD-10-CM

## 2021-06-29 DIAGNOSIS — Z98.890 HX OF LUMBAR DISCECTOMY: ICD-10-CM

## 2021-06-29 DIAGNOSIS — M51.16 LUMBAR DISC DISEASE WITH RADICULOPATHY: ICD-10-CM

## 2021-06-29 PROCEDURE — 99214 OFFICE O/P EST MOD 30 MIN: CPT | Performed by: NURSE PRACTITIONER

## 2021-06-29 RX ORDER — HYDROCODONE BITARTRATE AND ACETAMINOPHEN 10; 325 MG/1; MG/1
1 TABLET ORAL EVERY 8 HOURS PRN
Qty: 90 TABLET | Refills: 0 | Status: SHIPPED | OUTPATIENT
Start: 2021-07-14 | End: 2021-08-13

## 2021-06-29 RX ORDER — ALPRAZOLAM 1 MG/1
1 TABLET ORAL 3 TIMES DAILY PRN
Qty: 270 TABLET | Refills: 1 | Status: SHIPPED | OUTPATIENT
Start: 2021-06-29 | End: 2022-03-10 | Stop reason: SDUPTHER

## 2021-06-29 ASSESSMENT — ENCOUNTER SYMPTOMS
COUGH: 0
NAUSEA: 0
ABDOMINAL PAIN: 0
BACK PAIN: 1
SHORTNESS OF BREATH: 0

## 2021-06-29 NOTE — PROGRESS NOTES
Visit Information    Have you changed or started any medications since your last visit including any over-the-counter medicines, vitamins, or herbal medicines? no   Are you having any side effects from any of your medications? -  no  Have you stopped taking any of your medications? Is so, why? -  no    Have you seen any other physician or provider since your last visit? No  Have you had any other diagnostic tests since your last visit? No  Have you been seen in the emergency room and/or had an admission to a hospital since we last saw you? No  Have you had your routine dental cleaning in the past 6 months? Yes     Have you activated your [employer] account? If not, what are your barriers?  Yes     Patient Care Team:  IVANNA Grider CNP as PCP - General (Family Nurse Practitioner)  IVANNA Grider CNP as PCP - [facility]  Satish Jackson DO as Consulting Physician (Neurology)    Medical History Review  Past Medical, Family, and Social History reviewed and does contribute to the patient presenting condition    Health Maintenance   Topic Date Due    COVID-19 Vaccine (1) Never done    HIV screen  Never done    DTaP/Tdap/Td vaccine (1 - Tdap) Never done    Shingles Vaccine (1 of 2) Never done    A1C test (Diabetic or Prediabetic)  04/03/2022    Lipid screen  04/03/2022    Potassium monitoring  04/03/2022    Creatinine monitoring  04/03/2022    Colon cancer screen colonoscopy  10/21/2028    Flu vaccine  Completed    Hepatitis C screen  Completed    Hepatitis A vaccine  Aged Out    Hepatitis B vaccine  Aged Out    Hib vaccine  Aged Out    Meningococcal (ACWY) vaccine  Aged Out    Pneumococcal 0-64 years Vaccine  Aged Out

## 2021-06-29 NOTE — PROGRESS NOTES
Subjective:      Patient ID: Rahel Sullivan. 1965 is a 60 y.o. male here for evaluation. Chief Complaint   Patient presents with    3 Month Follow-Up     pain     Anxiety       Patient Active Problem List   Diagnosis    HTN (hypertension)    Hypercholesteremia    Insomnia    Spine pain,  L-S- chronic pain    Varicose veins of both lower extremities    GERD (gastroesophageal reflux disease)    ED (erectile dysfunction)    Allergic rhinitis    Itchy skin    Medication monitoring encounter    Chronic cervical pain with radicular shoulder pain.  Chronic abdominal pain, reflux/heartburn.  Bilateral knee pain, rt>lt.  Lumbar disc disease with radiculopathy, L5-S1 with sciatica    Chronic anxiety    Hx of lumbar discectomy, L5-S1, with one level fusion, 11/18/16.  Primary osteoarthritis of right knee, bone on bone    Pain of right hip joint    Ventral hernia without obstruction or gangrene    Rectal bleeding    Acute GI bleeding    Hypovolemic shock (HCC)    Hemorrhagic shock (HCC)    Opioid dependence with opioid-induced sexual dysfunction ([address]    Acquired buried penis    History of operative procedure on lumbosacral spinal structure    Intermittent claudication ([address]    PVD (peripheral vascular disease) ([address]       COLONOSCOPY - 2018    Back surgery in 2020 and 2016 with hardware. He is on Norco  QID. Chronic back pain. Chronic joint pains. Hx of partial knee replacement. Active.       Anxiety stable on use of Xanax 1 mg up to 3 times daily. Usually take no more than BID and tries to get away with 1 per day. #270 1R lasted him 9 months. Denies CP, SOB or chest tightness    Vitals:    06/29/21 1528   BP: 124/74   Pulse: 100   Resp: 16   SpO2: 98%        On Lisinopril 40 mg.  BP wnl. BP Readings from Last 3 Encounters:   06/29/21 124/74   05/24/21 94/60   03/29/21 124/60       Lipitor 40 mg.  Labs reviewed    Lab Results   Component Value Date LABA1C 6.1 04/03/2021    LABA1C 5.8 05/05/2018     Lab Results   Component Value Date     05/05/2018       No components found for: CHLPL  Lab Results   Component Value Date    TRIG 270 (H) 09/30/2020    TRIG 228 (H) 11/02/2019    TRIG 293 (H) 05/04/2019     Lab Results   Component Value Date    HDL 41 09/30/2020    HDL 39 (L) 11/02/2019    HDL 33 (L) 05/04/2019     Lab Results   Component Value Date    LDLCALC 62 09/30/2020    LDLCALC 53 11/02/2019    LDLCALC 45 05/04/2019     No results found for: LABVLDL      Chemistry        Component Value Date/Time     04/03/2021 1143    K 5.2 04/03/2021 1143    K 3.9 10/22/2018 0208    CL 99 04/03/2021 1143    CO2 27 04/03/2021 1143    BUN 11 04/03/2021 1143    CREATININE 0.7 04/03/2021 1143        Component Value Date/Time    CALCIUM 9.6 04/03/2021 1143    ALKPHOS 45 04/03/2021 1143    AST 47 (H) 04/03/2021 1143    ALT 61 04/03/2021 1143    BILITOT 0.8 04/03/2021 1143            No results found for: TSH, C8QZAZC, L6SJPEA, THYROIDAB    Lab Results   Component Value Date    WBC 5.3 09/30/2020    HGB 14.6 09/30/2020    HCT 43.0 09/30/2020    MCV 94.7 (H) 09/30/2020     09/30/2020         Health Maintenance   Topic Date Due    COVID-19 Vaccine (1) Never done    HIV screen  Never done    DTaP/Tdap/Td vaccine (1 - Tdap) Never done    Shingles Vaccine (1 of 2) Never done    A1C test (Diabetic or Prediabetic)  04/03/2022    Lipid screen  04/03/2022    Potassium monitoring  04/03/2022    Creatinine monitoring  04/03/2022    Colon cancer screen colonoscopy  10/21/2028    Flu vaccine  Completed    Hepatitis C screen  Completed    Hepatitis A vaccine  Aged Out    Hepatitis B vaccine  Aged Out    Hib vaccine  Aged Out    Meningococcal (ACWY) vaccine  Aged Out    Pneumococcal 0-64 years Vaccine  Aged [employer] History   Administered Date(s) Administered    Influenza Vaccine, unspecified formulation 10/22/2015, 10/01/2016    Influenza Virus Vaccine 10/20/2019    Influenza, Yudelka Sarna, 6 mo and older, IM, PF (Flulaval, Fluarix) 10/23/2018    Influenza, Quadv, IM, PF (6 mo and older Fluzone, Flulaval, Fluarix, and 3 yrs and older Afluria) 10/28/2017, 10/20/2019, 10/25/2020       Review of Systems   Constitutional: Negative for chills and fever. HENT: Negative. Respiratory: Negative for cough and shortness of breath. Cardiovascular: Negative for chest pain. Gastrointestinal: Negative for abdominal pain and nausea. Musculoskeletal: Positive for arthralgias and back pain. Skin: Negative for rash. Neurological: Negative for dizziness, light-headedness and headaches. Psychiatric/Behavioral: The patient is nervous/anxious. Objective:   Physical Exam  Constitutional:       General: He is not in acute distress. Eyes:      Pupils: Pupils are equal, round, and reactive to light. Cardiovascular:      Rate and Rhythm: Normal rate and regular rhythm. Heart sounds: No murmur heard. Pulmonary:      Effort: Pulmonary effort is normal.      Breath sounds: Normal breath sounds. No wheezing. Abdominal:      General: Bowel sounds are normal. There is no distension. Palpations: Abdomen is soft. Tenderness: There is no abdominal tenderness. Musculoskeletal:      Cervical back: Normal range of motion and neck supple. Lumbar back: Decreased range of motion. Right knee: Tenderness present. Left knee: Tenderness present. Skin:     General: Skin is warm and dry. Findings: No rash. Psychiatric:         Mood and Affect: Mood is anxious. Judgment: Judgment is impulsive. Assessment:       Diagnosis Orders   1. IFG (impaired fasting glucose)  Hemoglobin A1C   2. Elevated uric acid in blood  Hepatic Function Panel    Uric Acid   3. Anxiety  ALPRAZolam (XANAX) 1 MG tablet   4.  Lumbar disc disease with radiculopathy, L5-S1 with sciatica  HYDROcodone-acetaminophen (NORCO)  MG per tablet 5. Hx of lumbar discectomy, L5-S1, with one level fusion, 11/18/16. HYDROcodone-acetaminophen (NORCO)  MG per tablet   6. Primary osteoarthritis involving multiple joints  HYDROcodone-acetaminophen (NORCO)  MG per tablet           Plan:      Chronic conditions stable  Labs as above  Refills as above  Again discussion on lack of benefit of chronic opioid pain control     - high risk of tolerance to medication   - additional options discussed but denied  Patient is minimizing Xanax: 1-2 doses per day  Follow up with Specialists  Immunizations discussed   Labs in 6 months  RTO in 6 months          Current Outpatient Medications   Medication Sig Dispense Refill    ALPRAZolam (XANAX) 1 MG tablet Take 1 tablet by mouth 3 times daily as needed for Anxiety for up to 270 days. 270 tablet 1    [START ON 7/14/2021] HYDROcodone-acetaminophen (NORCO)  MG per tablet Take 1 tablet by mouth every 8 hours as needed for Pain for up to 30 days. Fill on or after 7/14/21. Intended supply: 30 days 90 tablet 0    Misc Natural Products (TART CHERRY ADVANCED PO) Take 3,000 mg by mouth      allopurinol (ZYLOPRIM) 300 MG tablet Take 1 tablet by mouth daily 90 tablet 1    indomethacin (INDOCIN) 50 MG capsule Take 1 capsule by mouth 3 times daily as needed for Pain Take with food.  15 capsule 0    lisinopril (PRINIVIL;ZESTRIL) 40 MG tablet TAKE 1 TABLET DAILY 90 tablet 1    omeprazole (PRILOSEC) 20 MG delayed release capsule       Dermatological Products, Misc. (EPICERAM) EMUL APPLY TO THE AFFECTED AREA(S) DAILY      atorvastatin (LIPITOR) 40 MG tablet TAKE 1 TABLET NIGHTLY 90 tablet 3    hydrOXYzine (ATARAX) 25 MG tablet Take 1 tablet by mouth 3 times daily as needed for Itching 120 tablet 2    tadalafil (CIALIS) 20 MG tablet Take 1 tablet by mouth as needed for Erectile Dysfunction 30 tablet 3    cilostazol (PLETAL) 100 MG tablet Take one tablet by mouth twice daily, 30min before or 2hrs after breakfast/dinner meal .      CALCIUM CARBONATE-VITAMIN D PO Take 2 tablets by mouth daily Calcium carbonate 600 mg tablet (total from taking 2 tablets is 1200 mg), unsure of vitamin D dose      B Complex Vitamins (VITAMIN B COMPLEX PO) Take 1 tablet by mouth daily One daily       Multiple Vitamins-Minerals (MULTIVITAMIN & MINERAL PO) Take 1 tablet by mouth daily One daily--one bottle refill for one year        No current facility-administered medications for this visit.

## 2021-06-30 RX ORDER — HYDROCODONE BITARTRATE AND ACETAMINOPHEN 10; 325 MG/1; MG/1
1 TABLET ORAL EVERY 8 HOURS PRN
Qty: 90 TABLET | Refills: 0 | Status: SHIPPED | OUTPATIENT
Start: 2021-08-13 | End: 2021-09-12

## 2021-06-30 RX ORDER — HYDROCODONE BITARTRATE AND ACETAMINOPHEN 10; 325 MG/1; MG/1
1 TABLET ORAL EVERY 8 HOURS PRN
Qty: 90 TABLET | Refills: 0 | Status: SHIPPED | OUTPATIENT
Start: 2021-09-12 | End: 2021-10-22 | Stop reason: SDUPTHER

## 2021-08-05 ENCOUNTER — TELEPHONE (OUTPATIENT)
Dept: FAMILY MEDICINE CLINIC | Age: 56
End: 2021-08-05

## 2021-08-05 NOTE — TELEPHONE ENCOUNTER
Patient notified and voiced understanding. Rendering Text In Billing: The slides were read, and reported in an attached document.

## 2021-08-05 NOTE — TELEPHONE ENCOUNTER
The patient called in to schedule an appt for next week stating that he has been having a muscle spasm in his upper left chest a couple inches below his clavicle. He states that it will happen about 30 times a day in the same spot. Denies any SOB, or pain or discomfort in the neck, shoulder or left arm. 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   Patient is scheduled for Tuesday 8/10 at 3:15pm.  Does he need seen before the 10th?

## 2021-08-10 ENCOUNTER — OFFICE VISIT (OUTPATIENT)
Dept: FAMILY MEDICINE CLINIC | Age: 56
End: 2021-08-10
Payer: COMMERCIAL

## 2021-08-10 VITALS
SYSTOLIC BLOOD PRESSURE: 118 MMHG | RESPIRATION RATE: 15 BRPM | BODY MASS INDEX: 26.63 KG/M2 | DIASTOLIC BLOOD PRESSURE: 72 MMHG | HEART RATE: 101 BPM | WEIGHT: 218.8 LBS | OXYGEN SATURATION: 98 %

## 2021-08-10 DIAGNOSIS — S29.011A MUSCLE STRAIN OF CHEST WALL, INITIAL ENCOUNTER: Primary | ICD-10-CM

## 2021-08-10 PROCEDURE — 99213 OFFICE O/P EST LOW 20 MIN: CPT | Performed by: NURSE PRACTITIONER

## 2021-08-10 ASSESSMENT — ENCOUNTER SYMPTOMS
SHORTNESS OF BREATH: 0
NAUSEA: 0
BACK PAIN: 1
ABDOMINAL PAIN: 0
COUGH: 0

## 2021-08-10 NOTE — PROGRESS NOTES
Subjective:      Patient ID: Yulia Kaba. 1965 is a 60 y.o. male here for evaluation. 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 Onset of 1 week ago Pain came on there after. Felt like muscle were spasms in left chest only and tight with ROM of left arm. No pain down arm. No jaw pain. Denies SOB or chest tightness. No lightheadedness. Underlying PAD with stent placement. Has been seeing Chiropractor with benefit. Heating pad, stretches and adjustments. Pain left often. Comes in spurts. Muscle twitching and spasm. No restriction with increase CARDIO at work or home. Vitals:    08/10/21 1515   BP: 118/72   Pulse: 101   Resp: 15   SpO2: 98%       Patient Active Problem List   Diagnosis    HTN (hypertension)    Hypercholesteremia    Insomnia    Spine pain,  L-S- chronic pain    Varicose veins of both lower extremities    GERD (gastroesophageal reflux disease)    ED (erectile dysfunction)    Allergic rhinitis    Itchy skin    Medication monitoring encounter    Chronic cervical pain with radicular shoulder pain.  Chronic abdominal pain, reflux/heartburn.  Bilateral knee pain, rt>lt.  Lumbar disc disease with radiculopathy, L5-S1 with sciatica    Chronic anxiety    Hx of lumbar discectomy, L5-S1, with one level fusion, 11/18/16.     Primary osteoarthritis of right knee, bone on bone    Pain of right hip joint    Ventral hernia without obstruction or gangrene    Rectal bleeding    Acute GI bleeding    Hypovolemic shock (HCC)    Hemorrhagic shock (HCC)    Opioid dependence with opioid-induced sexual dysfunction ([facility] [address]    Acquired buried penis    History of operative procedure on lumbosacral spinal structure    Intermittent claudication ([facility] [address]    PVD (peripheral vascular disease) ([facility] [address]       COLONOSCOPY - 2018    Back surgery in 2020 and 2016 with hardware. He is on Norco  QID. Chronic back pain. Chronic joint pains. Hx of partial knee replacement. Active.       Anxiety stable on use of Xanax 1 mg up to 3 times daily. Usually take no more than BID and tries to get away with 1 per day. #270 1R lasted him 9 months. Denies CP, SOB or chest tightness    Vitals:    08/10/21 1515   BP: 118/72   Pulse: 101   Resp: 15   SpO2: 98%        On Lisinopril 40 mg.  BP wnl. BP Readings from Last 3 Encounters:   08/10/21 118/72   06/29/21 124/74   05/24/21 94/60       Lipitor 40 mg.  Labs reviewed    Lab Results   Component Value Date    LABA1C 6.1 04/03/2021    LABA1C 5.8 05/05/2018     Lab Results   Component Value Date     05/05/2018       No components found for: CHLPL  Lab Results   Component Value Date    TRIG 270 (H) 09/30/2020    TRIG 228 (H) 11/02/2019    TRIG 293 (H) 05/04/2019     Lab Results   Component Value Date    HDL 41 09/30/2020    HDL 39 (L) 11/02/2019    HDL 33 (L) 05/04/2019     Lab Results   Component Value Date    LDLCALC 62 09/30/2020    LDLCALC 53 11/02/2019    LDLCALC 45 05/04/2019     No results found for: LABVLDL      Chemistry        Component Value Date/Time     04/03/2021 1143    K 5.2 04/03/2021 1143    K 3.9 10/22/2018 0208    CL 99 04/03/2021 1143    CO2 27 04/03/2021 1143    BUN 11 04/03/2021 1143    CREATININE 0.7 04/03/2021 1143        Component Value Date/Time    CALCIUM 9.6 04/03/2021 1143    ALKPHOS 45 04/03/2021 1143    AST 47 (H) 04/03/2021 1143    ALT 61 04/03/2021 1143    BILITOT 0.8 04/03/2021 1143            No results found for: TSH, R3CXQCX, H5BJOLM, THYROIDAB    Lab Results   Component Value Date    WBC 5.3 09/30/2020    HGB 14.6 09/30/2020    HCT 43.0 09/30/2020    MCV 94.7 (H) 09/30/2020     09/30/2020         Health Maintenance   Topic Date Due    COVID-19 Vaccine (1) Never done    HIV screen  Never done    DTaP/Tdap/Td vaccine (1 - Tdap) Never done    Shingles Vaccine (1 of 2) Never done    Flu vaccine (1) 09/01/2021    A1C test (Diabetic or Prediabetic)  04/03/2022    Lipid screen  04/03/2022    Potassium monitoring  04/03/2022    Creatinine monitoring  04/03/2022    Colon cancer screen colonoscopy  10/21/2028    Hepatitis C screen  Completed    Hepatitis A vaccine  Aged Out    Hepatitis B vaccine  Aged Out    Hib vaccine  Aged Out    Meningococcal (ACWY) vaccine  Aged Out    Pneumococcal 0-64 years Vaccine  Aged [employer] History   Administered Date(s) Administered    Influenza Vaccine, unspecified formulation 10/22/2015, 10/01/2016    Influenza Virus Vaccine 10/20/2019    Influenza, Quadv, 6 mo and older, IM, PF (Flulaval, Fluarix) 10/23/2018    Influenza, Quadv, IM, PF (6 mo and older Fluzone, Flulaval, Fluarix, and 3 yrs and older Afluria) 10/28/2017, 10/20/2019, 10/25/2020       Review of Systems   Constitutional: Negative for chills and fever. HENT: Negative. Respiratory: Negative for cough and shortness of breath. Cardiovascular: Negative for chest pain. Gastrointestinal: Negative for abdominal pain and nausea. Musculoskeletal: Positive for arthralgias and back pain. Skin: Negative for rash. Neurological: Negative for dizziness, light-headedness and headaches. Psychiatric/Behavioral: The patient is nervous/anxious. Objective:   Physical Exam  Constitutional:       General: He is not in acute distress. Eyes:      Pupils: Pupils are equal, round, and reactive to light. Cardiovascular:      Rate and Rhythm: Normal rate and regular rhythm. Heart sounds: No murmur heard. Pulmonary:      Effort: Pulmonary effort is normal.      Breath sounds: Normal breath sounds. No wheezing. Abdominal:      General: Bowel sounds are normal. There is no distension. Palpations: Abdomen is soft. Tenderness: There is no abdominal tenderness. Musculoskeletal:      Cervical back: Normal range of motion and neck supple. Lumbar back: Decreased range of motion. Right knee: Tenderness present. Left knee: Tenderness present. Skin:     General: Skin is warm and dry. Findings: No rash. Psychiatric:         Mood and Affect: Mood is anxious. Judgment: Judgment is impulsive. Assessment:       Diagnosis Orders   1. Muscle strain of chest wall, initial encounter             Plan:      Reassurance over MSK origin  Improving with Chiropractic care - continue tx  KNA          Current Outpatient Medications   Medication Sig Dispense Refill    ALPRAZolam (XANAX) 1 MG tablet Take 1 tablet by mouth 3 times daily as needed for Anxiety for up to 270 days. 270 tablet 1    HYDROcodone-acetaminophen (NORCO)  MG per tablet Take 1 tablet by mouth every 8 hours as needed for Pain for up to 30 days. Fill on or after 7/14/21. Intended supply: 30 days 90 tablet 0    Misc Natural Products (TART CHERRY ADVANCED PO) Take 3,000 mg by mouth      allopurinol (ZYLOPRIM) 300 MG tablet Take 1 tablet by mouth daily 90 tablet 1    indomethacin (INDOCIN) 50 MG capsule Take 1 capsule by mouth 3 times daily as needed for Pain Take with food.  15 capsule 0    lisinopril (PRINIVIL;ZESTRIL) 40 MG tablet TAKE 1 TABLET DAILY 90 tablet 1    omeprazole (PRILOSEC) 20 MG delayed release capsule       Dermatological Products, Misc. (EPICERAM) EMUL APPLY TO THE AFFECTED AREA(S) DAILY      atorvastatin (LIPITOR) 40 MG tablet TAKE 1 TABLET NIGHTLY 90 tablet 3    hydrOXYzine (ATARAX) 25 MG tablet Take 1 tablet by mouth 3 times daily as needed for Itching 120 tablet 2    CALCIUM CARBONATE-VITAMIN D PO Take 2 tablets by mouth daily Calcium carbonate 600 mg tablet (total from taking 2 tablets is 1200 mg), unsure of vitamin D dose      B Complex Vitamins (VITAMIN B COMPLEX PO) Take 1 tablet by mouth daily One daily       Multiple Vitamins-Minerals (MULTIVITAMIN & MINERAL PO) Take 1 tablet by mouth daily One daily--one bottle refill for one year       [START ON 9/12/2021] HYDROcodone-acetaminophen (NORCO)  MG per tablet Take 1 tablet by mouth every 8 hours as needed for Pain for up to 30 days. Fill on or after 9/12/21. Intended supply: 30 days (Patient not taking: Reported on 8/10/2021) 90 tablet 0    [START ON 8/13/2021] HYDROcodone-acetaminophen (NORCO)  MG per tablet Take 1 tablet by mouth every 8 hours as needed for Pain for up to 30 days. Fill on or after 8/13/21. Intended supply: 30 days (Patient not taking: Reported on 8/10/2021) 90 tablet 0    tadalafil (CIALIS) 20 MG tablet Take 1 tablet by mouth as needed for Erectile Dysfunction 30 tablet 3    cilostazol (PLETAL) 100 MG tablet Take one tablet by mouth twice daily, 30min before or 2hrs after breakfast/dinner meal . (Patient not taking: Reported on 8/10/2021)       No current facility-administered medications for this visit.

## 2021-08-13 DIAGNOSIS — R05.9 COUGH: ICD-10-CM

## 2021-08-13 RX ORDER — DEXTROMETHORPHAN HYDROBROMIDE AND PROMETHAZINE HYDROCHLORIDE 15; 6.25 MG/5ML; MG/5ML
5 SYRUP ORAL 4 TIMES DAILY PRN
Qty: 120 ML | Refills: 0 | Status: SHIPPED | OUTPATIENT
Start: 2021-08-13 | End: 2021-08-27 | Stop reason: SDUPTHER

## 2021-08-16 ENCOUNTER — VIRTUAL VISIT (OUTPATIENT)
Dept: FAMILY MEDICINE CLINIC | Age: 56
End: 2021-08-16
Payer: COMMERCIAL

## 2021-08-16 ENCOUNTER — HOSPITAL ENCOUNTER (OUTPATIENT)
Age: 56
Setting detail: SPECIMEN
Discharge: HOME | End: 2021-08-16
Payer: COMMERCIAL

## 2021-08-16 DIAGNOSIS — Z20.822 SUSPECTED COVID-19 VIRUS INFECTION: ICD-10-CM

## 2021-08-16 DIAGNOSIS — B34.9 VIRAL ILLNESS: Primary | ICD-10-CM

## 2021-08-16 PROCEDURE — 99213 OFFICE O/P EST LOW 20 MIN: CPT | Performed by: FAMILY MEDICINE

## 2021-08-16 PROCEDURE — 87636 SARSCOV2 & INF A&B AMP PRB: CPT

## 2021-08-16 ASSESSMENT — ENCOUNTER SYMPTOMS
RHINORRHEA: 1
SINUS PRESSURE: 1
COUGH: 1
GASTROINTESTINAL NEGATIVE: 1

## 2021-08-16 NOTE — PROGRESS NOTES
HEMORRHOID SURGERY  09/28/2018    banding-Dr Jenkins Yolanda    JOINT REPLACEMENT Right 07/18/2019    knee partial replacement    KNEE ARTHROSCOPY Bilateral     mensicus repair-Dr Isabella Guzman    MD OFFICE/OUTPT VISIT,PROCEDURE ONLY N/A 10/24/2018    [facility]    Dr Charlene Barrera  10/21/2018    EGD ESOPHAGOGASTRODUODENOSCOPY performed by Kim Bonilla MD at [facility]     Social History     Tobacco Use    Smoking status: Former Smoker     Packs/day: 1.50     Years: 25.00     Pack years: 37.50     Types: Cigarettes     Quit date: 1/23/2006     Years since quitting: 15.5    Smokeless tobacco: Never Used   Vaping Use    Vaping Use: Never used   Substance Use Topics    Alcohol use: Yes     Comment: occasional    Drug use: No         Review of Systems   Constitutional: Negative. Negative for fever. HENT: Positive for congestion, postnasal drip, rhinorrhea and sinus pressure. Respiratory: Positive for cough. Cardiovascular: Negative. Gastrointestinal: Negative. Musculoskeletal: Negative. All other systems reviewed and are negative. No flowsheet data found. Physical Exam  Constitutional:       General: He is not in acute distress. Appearance: Normal appearance. He is well-developed. He is not ill-appearing. HENT:      Head: Normocephalic and atraumatic. Right Ear: External ear normal.      Left Ear: External ear normal.   Eyes:      Conjunctiva/sclera: Conjunctivae normal.   Pulmonary:      Effort: Pulmonary effort is normal. No respiratory distress. Skin:     Findings: No rash (on exposed surfaces). Neurological:      Mental Status: He is alert and oriented to person, place, and time. Psychiatric:         Mood and Affect: Mood normal.         Behavior: Behavior normal.         Thought Content:  Thought content normal.         Judgment: Judgment normal.     ASSESSMENT/PLAN:  1. Viral illness  2. Suspected COVID-19 virus infection  -     COVID-19; Future    -  Check for COVID  -  Self isolate until results return  -  Symptomatic care in the meantime      Return if symptoms worsen or fail to improve. Prudence Mcgarry., was evaluated through a synchronous (real-time) audio-video encounter. The patient (or guardian if applicable) is aware that this is a billable service. Verbal consent to proceed has been obtained within the past 12 months. The visit was conducted pursuant to the emergency declaration under the [address] [place] [address], [address] [place] [address] authority and the [employer] Act. Patient identification was verified, and a caregiver was present when appropriate. The patient was located in a state where the provider was credentialed to provide care. An electronic signature was used to authenticate this note.     --Krystin Ortega, DO

## 2021-08-16 NOTE — PROGRESS NOTES
PCR covid red top nasopharyngeal swab obtained left nare and sent to lab.  Pt tolerated and instructed to check My Chart for results in 24 hours or check with ordering MD.

## 2021-08-17 LAB
INFLUENZA A: NOT DETECTED
INFLUENZA B: NOT DETECTED
SARS-COV-2 RNA, RT PCR: NOT DETECTED

## 2021-08-17 RX ORDER — AMOXICILLIN AND CLAVULANATE POTASSIUM 875; 125 MG/1; MG/1
1 TABLET, FILM COATED ORAL 2 TIMES DAILY
Qty: 14 TABLET | Refills: 0 | Status: SHIPPED | OUTPATIENT
Start: 2021-08-17 | End: 2021-08-24

## 2021-08-20 ENCOUNTER — TELEPHONE (OUTPATIENT)
Dept: FAMILY MEDICINE CLINIC | Age: 56
End: 2021-08-20

## 2021-08-20 NOTE — TELEPHONE ENCOUNTER
He is only on day 4 of ATB.   Too still have some symptoms is normal.   Complete out prescription and if continue with symptoms notify office

## 2021-08-20 NOTE — TELEPHONE ENCOUNTER
----- Message from Yung Fall sent at 8/20/2021  8:42 AM EDT -----  Subject: Medication Problem    QUESTIONS  Name of Medication? amoxicillin-clavulanate (AUGMENTIN) 875-125 MG per   tablet  Patient-reported dosage and instructions? 2 times per day for 7 days  What question or problem do you have with the medication? Still feeling   stuffy. He is wondering if that's normal or if it is not working. Please   call pt. Preferred Pharmacy? [address] [place] [address] [address] - [place], [address] [place] [address],[facility] Floor - [phone]  Pharmacy phone number (if available)? [phone]  Additional Information for Provider?   ---------------------------------------------------------------------------  --------------  [address]  What is the best way for the office to contact you? OK to leave message on   voicemail  Preferred Call Back Phone Number? [phone]  ---------------------------------------------------------------------------  --------------  SCRIPT ANSWERS  Relationship to Patient?  Self

## 2021-08-24 ENCOUNTER — TELEPHONE (OUTPATIENT)
Dept: FAMILY MEDICINE CLINIC | Age: 56
End: 2021-08-24

## 2021-08-24 NOTE — TELEPHONE ENCOUNTER
The patient called in and stated that he was treated with a ATB and cough syrup for a URI. He stated that he has completed the ATB and is 97% better. He states that he does take the cough syrup at times but he is having issues with hears popping and will get head congestion at times later in the day. He is asking what he can take to help with this issue. Please advise. The patient uses [employer].

## 2021-08-27 ENCOUNTER — PATIENT MESSAGE (OUTPATIENT)
Dept: FAMILY MEDICINE CLINIC | Age: 56
End: 2021-08-27

## 2021-08-27 DIAGNOSIS — R05.9 COUGH: ICD-10-CM

## 2021-08-27 RX ORDER — DEXTROMETHORPHAN HYDROBROMIDE AND PROMETHAZINE HYDROCHLORIDE 15; 6.25 MG/5ML; MG/5ML
5 SYRUP ORAL 4 TIMES DAILY PRN
Qty: 120 ML | Refills: 0 | Status: SHIPPED | OUTPATIENT
Start: 2021-08-27 | End: 2021-09-03

## 2021-08-27 NOTE — TELEPHONE ENCOUNTER
From: Natacha Shaver To: IVANNA Alfredo - CNP  Sent: 8/27/2021 7:29 AM EDT  Subject: Prescription Question    Could you send a refill for the cough medicine Promethazine - DM to [place] today? Would like to have some to take with me on my vacation that starts on Saturday the 28th.     Thank you,  Binu Grullon

## 2021-10-11 ENCOUNTER — TELEPHONE (OUTPATIENT)
Dept: FAMILY MEDICINE CLINIC | Age: 56
End: 2021-10-11

## 2021-10-11 NOTE — TELEPHONE ENCOUNTER
Patient lost taste and smell on Saturday and did an OTC test for COVID that was positive. He does not need anything from us at this time, just wanted to notify the office.

## 2021-10-12 ENCOUNTER — VIRTUAL VISIT (OUTPATIENT)
Dept: FAMILY MEDICINE CLINIC | Age: 56
End: 2021-10-12
Payer: COMMERCIAL

## 2021-10-12 DIAGNOSIS — R05.9 COUGH: ICD-10-CM

## 2021-10-12 DIAGNOSIS — R73.01 IFG (IMPAIRED FASTING GLUCOSE): ICD-10-CM

## 2021-10-12 DIAGNOSIS — I10 ESSENTIAL HYPERTENSION: ICD-10-CM

## 2021-10-12 DIAGNOSIS — U07.1 COVID-19: Primary | ICD-10-CM

## 2021-10-12 PROCEDURE — 99213 OFFICE O/P EST LOW 20 MIN: CPT | Performed by: NURSE PRACTITIONER

## 2021-10-12 ASSESSMENT — ENCOUNTER SYMPTOMS
RHINORRHEA: 1
ABDOMINAL PAIN: 0
NAUSEA: 0
SHORTNESS OF BREATH: 0
COUGH: 1

## 2021-10-12 NOTE — PROGRESS NOTES
(peripheral vascular disease) ([facility] [address]       Review of Systems   Constitutional: Positive for activity change, appetite change and fatigue. Negative for chills and fever. HENT: Positive for congestion, postnasal drip and rhinorrhea. Respiratory: Positive for cough. Negative for shortness of breath. Cardiovascular: Negative for chest pain. Gastrointestinal: Negative for abdominal pain and nausea. Skin: Negative for rash. Neurological: Positive for weakness. Negative for dizziness, light-headedness and headaches. Psychiatric/Behavioral: Negative. Objective:   Physical Exam  Constitutional:       General: He is not in acute distress. Appearance: He is not ill-appearing. Pulmonary:      Effort: Pulmonary effort is normal. No respiratory distress. Neurological:      Mental Status: He is alert and oriented to person, place, and time. Psychiatric:         Mood and Affect: Mood normal.         Behavior: Behavior normal.         Assessment:       Diagnosis Orders   1. COVID-19     2. Cough     3. IFG (impaired fasting glucose)     4. Essential hypertension         Plan:     Viral nature of symptoms discussed  Symptomatic Care  Increase fluids and rest  Discussion on Regeneron INF - defer for now  RTO if symptoms worsen or stay the same           Due to this being a TeleHealth encounter (During [facility] emergency), evaluation of the following organ systems was limited: Vitals/Constitutional/EENT/Resp/CV/GI//MS/Neuro/Skin/Heme-Lymph-Imm. Pursuant to the emergency declaration under the [facility], [address] [place] [address] authority and the [employer] Act, this Virtual Visit was conducted with patient's (and/or legal guardian's) consent, to reduce the patient's risk of exposure to COVID-19 and provide necessary medical care.   The patient (and/or legal guardian) has also been advised to contact this office for worsening conditions or problems, and seek emergency medical treatment and/or call 911 if deemed necessary. --Andrey Saab, APRN - CNP on 10/12/2021 at 7:52 AM    An electronic signature was used to authenticate this note.      10/12/2021

## 2021-11-12 ENCOUNTER — PATIENT MESSAGE (OUTPATIENT)
Dept: FAMILY MEDICINE CLINIC | Age: 56
End: 2021-11-12

## 2021-11-12 DIAGNOSIS — J30.2 ACUTE SEASONAL ALLERGIC RHINITIS: ICD-10-CM

## 2021-11-12 RX ORDER — HYDROXYZINE HYDROCHLORIDE 25 MG/1
25 TABLET, FILM COATED ORAL 3 TIMES DAILY PRN
Qty: 270 TABLET | Refills: 1 | Status: SHIPPED | OUTPATIENT
Start: 2021-11-12

## 2021-11-12 NOTE — TELEPHONE ENCOUNTER
From: Oriana Cross To: Alyson Solo  Sent: 11/12/2021 6:01 AM EST  Subject: Prescription Question    Would you send me a new prescription for Hydroxyzine to mail in insurance [employer]?     Thank you,  Demetria Ramirez

## 2021-11-18 ENCOUNTER — TELEPHONE (OUTPATIENT)
Dept: FAMILY MEDICINE CLINIC | Age: 56
End: 2021-11-18

## 2021-11-18 DIAGNOSIS — R39.11 URINARY HESITANCY: Primary | ICD-10-CM

## 2021-11-18 RX ORDER — CIPROFLOXACIN 500 MG/1
500 TABLET, FILM COATED ORAL 2 TIMES DAILY
Qty: 20 TABLET | Refills: 0 | Status: SHIPPED | OUTPATIENT
Start: 2021-11-18 | End: 2021-11-28

## 2021-11-18 NOTE — TELEPHONE ENCOUNTER
----- Message from [address] [facility] sent at 11/18/2021 12:07 PM EST -----  Subject: Message to Provider    QUESTIONS  Information for Provider? Pt c/o some leakage after urination, feels like   he has to urinate but can't x 2-3 days. Would like antibiotic called into   [facility]. Please call pt and let him know if provider with give him   antibiotic or not.  ---------------------------------------------------------------------------  --------------  CALL BACK INFO  What is the best way for the office to contact you? OK to leave message on   voicemail  Preferred Call Back Phone Number? [phone]  ---------------------------------------------------------------------------  --------------  SCRIPT ANSWERS  Relationship to Patient?  Self

## 2021-11-22 DIAGNOSIS — E78.00 HYPERCHOLESTEREMIA: ICD-10-CM

## 2021-11-22 RX ORDER — ATORVASTATIN CALCIUM 40 MG/1
TABLET, FILM COATED ORAL
Qty: 90 TABLET | Refills: 3 | Status: SHIPPED | OUTPATIENT
Start: 2021-11-22

## 2021-11-22 RX ORDER — LISINOPRIL 40 MG/1
TABLET ORAL
Qty: 90 TABLET | Refills: 1 | Status: SHIPPED | OUTPATIENT
Start: 2021-11-22 | End: 2022-04-21

## 2021-11-22 NOTE — TELEPHONE ENCOUNTER
Requested Prescriptions     Pending Prescriptions Disp Refills    atorvastatin (LIPITOR) 40 MG tablet 90 tablet 3     Sig: TAKE 1 TABLET NIGHTLY

## 2021-11-23 DIAGNOSIS — Z98.890 HX OF LUMBAR DISCECTOMY: ICD-10-CM

## 2021-11-23 DIAGNOSIS — M15.9 PRIMARY OSTEOARTHRITIS INVOLVING MULTIPLE JOINTS: ICD-10-CM

## 2021-11-23 DIAGNOSIS — M51.16 LUMBAR DISC DISEASE WITH RADICULOPATHY: ICD-10-CM

## 2021-11-23 RX ORDER — HYDROCODONE BITARTRATE AND ACETAMINOPHEN 10; 325 MG/1; MG/1
1 TABLET ORAL EVERY 8 HOURS PRN
Qty: 90 TABLET | Refills: 0 | Status: SHIPPED | OUTPATIENT
Start: 2021-11-23 | End: 2021-12-22 | Stop reason: SDUPTHER

## 2021-11-23 NOTE — TELEPHONE ENCOUNTER
Patient requesting refill of Norco to [facility] in [facility] [address]. Please refill if appropriate.   Will check [facility] pharmacy after 1pm

## 2021-12-22 ENCOUNTER — PATIENT MESSAGE (OUTPATIENT)
Dept: FAMILY MEDICINE CLINIC | Age: 56
End: 2021-12-22

## 2021-12-22 DIAGNOSIS — M51.16 LUMBAR DISC DISEASE WITH RADICULOPATHY: ICD-10-CM

## 2021-12-22 DIAGNOSIS — Z98.890 HX OF LUMBAR DISCECTOMY: ICD-10-CM

## 2021-12-22 DIAGNOSIS — M15.9 PRIMARY OSTEOARTHRITIS INVOLVING MULTIPLE JOINTS: ICD-10-CM

## 2021-12-22 RX ORDER — HYDROCODONE BITARTRATE AND ACETAMINOPHEN 10; 325 MG/1; MG/1
1 TABLET ORAL EVERY 8 HOURS PRN
Qty: 90 TABLET | Refills: 0 | Status: SHIPPED | OUTPATIENT
Start: 2021-12-23 | End: 2021-12-29 | Stop reason: SDUPTHER

## 2021-12-22 NOTE — TELEPHONE ENCOUNTER
From: Nandini Lyman. To: Josefa Paulino  Sent: 12/22/2021 6:52 AM EST  Subject: Prescription Question    I would like to have my [place]  refilled at [facility].      Thank you,   John Lazaro

## 2021-12-29 ENCOUNTER — OFFICE VISIT (OUTPATIENT)
Dept: FAMILY MEDICINE CLINIC | Age: 56
End: 2021-12-29
Payer: COMMERCIAL

## 2021-12-29 VITALS
HEIGHT: 76 IN | RESPIRATION RATE: 18 BRPM | WEIGHT: 222.9 LBS | DIASTOLIC BLOOD PRESSURE: 58 MMHG | HEART RATE: 92 BPM | BODY MASS INDEX: 27.14 KG/M2 | SYSTOLIC BLOOD PRESSURE: 128 MMHG

## 2021-12-29 DIAGNOSIS — F41.9 CHRONIC ANXIETY: ICD-10-CM

## 2021-12-29 DIAGNOSIS — M51.16 LUMBAR DISC DISEASE WITH RADICULOPATHY: ICD-10-CM

## 2021-12-29 DIAGNOSIS — Z00.00 WELL ADULT EXAM: Primary | ICD-10-CM

## 2021-12-29 DIAGNOSIS — I10 ESSENTIAL HYPERTENSION: ICD-10-CM

## 2021-12-29 DIAGNOSIS — M15.9 PRIMARY OSTEOARTHRITIS INVOLVING MULTIPLE JOINTS: ICD-10-CM

## 2021-12-29 DIAGNOSIS — F11.281: ICD-10-CM

## 2021-12-29 DIAGNOSIS — I73.9 PVD (PERIPHERAL VASCULAR DISEASE) (HCC): ICD-10-CM

## 2021-12-29 DIAGNOSIS — E78.00 HYPERCHOLESTEREMIA: ICD-10-CM

## 2021-12-29 DIAGNOSIS — Z98.890 HX OF LUMBAR DISCECTOMY: ICD-10-CM

## 2021-12-29 DIAGNOSIS — R73.01 IFG (IMPAIRED FASTING GLUCOSE): ICD-10-CM

## 2021-12-29 PROCEDURE — 99396 PREV VISIT EST AGE 40-64: CPT | Performed by: NURSE PRACTITIONER

## 2021-12-29 RX ORDER — HYDROCODONE BITARTRATE AND ACETAMINOPHEN 10; 325 MG/1; MG/1
1 TABLET ORAL EVERY 8 HOURS PRN
Qty: 90 TABLET | Refills: 0 | Status: SHIPPED | OUTPATIENT
Start: 2022-01-22 | End: 2022-02-21

## 2021-12-29 RX ORDER — HYDROCODONE BITARTRATE AND ACETAMINOPHEN 10; 325 MG/1; MG/1
1 TABLET ORAL EVERY 8 HOURS PRN
Qty: 90 TABLET | Refills: 0 | Status: SHIPPED | OUTPATIENT
Start: 2022-02-21 | End: 2022-03-24 | Stop reason: SDUPTHER

## 2021-12-29 ASSESSMENT — PATIENT HEALTH QUESTIONNAIRE - PHQ9
SUM OF ALL RESPONSES TO PHQ QUESTIONS 1-9: 0
SUM OF ALL RESPONSES TO PHQ9 QUESTIONS 1 & 2: 0
1. LITTLE INTEREST OR PLEASURE IN DOING THINGS: 0
SUM OF ALL RESPONSES TO PHQ QUESTIONS 1-9: 0
SUM OF ALL RESPONSES TO PHQ QUESTIONS 1-9: 0
2. FEELING DOWN, DEPRESSED OR HOPELESS: 0

## 2021-12-29 ASSESSMENT — ENCOUNTER SYMPTOMS
BACK PAIN: 1
NAUSEA: 0
ABDOMINAL PAIN: 0
COUGH: 0
SHORTNESS OF BREATH: 0

## 2021-12-29 NOTE — PROGRESS NOTES
Subjective:      Patient ID: Kade Mansfield. 1965 is a 60 y.o. male here for evaluation. Chief Complaint   Patient presents with    6 Month Follow-Up    Pain       Patient Active Problem List   Diagnosis    HTN (hypertension)    Hypercholesteremia    Insomnia    Spine pain,  L-S- chronic pain    Varicose veins of both lower extremities    GERD (gastroesophageal reflux disease)    ED (erectile dysfunction)    Allergic rhinitis    Itchy skin    Medication monitoring encounter    Chronic cervical pain with radicular shoulder pain.  Chronic abdominal pain, reflux/heartburn.  Bilateral knee pain, rt>lt.  Lumbar disc disease with radiculopathy, L5-S1 with sciatica    Chronic anxiety    Hx of lumbar discectomy, L5-S1, with one level fusion, 11/18/16.  Primary osteoarthritis of right knee, bone on bone    Pain of right hip joint    Ventral hernia without obstruction or gangrene    Rectal bleeding    Acute GI bleeding    Hypovolemic shock (HCC)    Hemorrhagic shock (HCC)    Opioid dependence with opioid-induced sexual dysfunction ([address]    Acquired buried penis    History of operative procedure on lumbosacral spinal structure    Intermittent claudication ([address]    PVD (peripheral vascular disease) ([address]       COLONOSCOPY - 2018    GASTRO - Dr. Raymond Keaton    Back surgery in 2020 and 2016 with hardware. He is on Norco  QID. Chronic back pain. Chronic joint pains. Hx of partial knee replacement. Keeps him active and moving.        Anxiety stable on use of Xanax 1 mg up to 3 times daily. Usually take no more than BID and tries to get away with 1 per day. #270 1R lasted him 9 months. Denies CP, SOB or chest tightness. Denies cramping in lower extremites with activity. Vitals:    12/29/21 1534   BP: (!) 128/58   Pulse: 92   Resp: 18        On Lisinopril 40 mg.  BP wnl.      BP Readings from Last 3 Encounters:   12/29/21 (!) 128/58   08/10/21 118/72   06/29/21 124/74       Lipitor 40 mg.  Labs reviewed    Lab Results   Component Value Date    LABA1C 6.1 04/03/2021    LABA1C 5.8 05/05/2018     Lab Results   Component Value Date     05/05/2018       No components found for: CHLPL  Lab Results   Component Value Date    TRIG 270 (H) 09/30/2020    TRIG 228 (H) 11/02/2019    TRIG 293 (H) 05/04/2019     Lab Results   Component Value Date    HDL 41 09/30/2020    HDL 39 (L) 11/02/2019    HDL 33 (L) 05/04/2019     Lab Results   Component Value Date    LDLCALC 62 09/30/2020    LDLCALC 53 11/02/2019    LDLCALC 45 05/04/2019     No results found for: LABVLDL      Chemistry        Component Value Date/Time     04/03/2021 1143    K 5.2 04/03/2021 1143    K 3.9 10/22/2018 0208    CL 99 04/03/2021 1143    CO2 27 04/03/2021 1143    BUN 11 04/03/2021 1143    CREATININE 0.7 04/03/2021 1143        Component Value Date/Time    CALCIUM 9.6 04/03/2021 1143    ALKPHOS 45 04/03/2021 1143    AST 47 (H) 04/03/2021 1143    ALT 61 04/03/2021 1143    BILITOT 0.8 04/03/2021 1143            No results found for: TSH, F6WJYPW, W4SMVCV, THYROIDAB    Lab Results   Component Value Date    WBC 5.3 09/30/2020    HGB 14.6 09/30/2020    HCT 43.0 09/30/2020    MCV 94.7 (H) 09/30/2020     09/30/2020         Health Maintenance   Topic Date Due    COVID-19 Vaccine (1) Never done    HIV screen  Never done    DTaP/Tdap/Td vaccine (1 - Tdap) Never done    Shingles Vaccine (1 of 2) Never done    Flu vaccine (1) 09/01/2021    A1C test (Diabetic or Prediabetic)  04/03/2022    Lipid screen  04/03/2022    Potassium monitoring  04/03/2022    Creatinine monitoring  04/03/2022    Colon cancer screen colonoscopy  10/21/2028    Hepatitis C screen  Completed    Hepatitis A vaccine  Aged Out    Hepatitis B vaccine  Aged Out    Hib vaccine  Aged Out    Meningococcal (ACWY) vaccine  Aged Out    Pneumococcal 0-64 years Vaccine  Aged [employer] History   Administered Date(s) Administered  Influenza Vaccine, unspecified formulation 10/22/2015, 10/01/2016    Influenza Virus Vaccine 10/20/2019    Influenza, Kisha Saint, 6 mo and older, IM, PF (Flulaval, Fluarix) 10/23/2018    Influenza, Quadv, IM, PF (6 mo and older Fluzone, Flulaval, Fluarix, and 3 yrs and older Afluria) 10/28/2017, 10/20/2019, 10/25/2020       Review of Systems   Constitutional: Negative for chills and fever. HENT: Negative. Respiratory: Negative for cough and shortness of breath. Cardiovascular: Negative for chest pain. Gastrointestinal: Negative for abdominal pain and nausea. Musculoskeletal: Positive for arthralgias and back pain. Skin: Negative for rash. Neurological: Negative for dizziness, light-headedness and headaches. Psychiatric/Behavioral: The patient is nervous/anxious. Objective:   Physical Exam  Constitutional:       General: He is not in acute distress. Eyes:      Pupils: Pupils are equal, round, and reactive to light. Cardiovascular:      Rate and Rhythm: Normal rate and regular rhythm. Heart sounds: No murmur heard. Pulmonary:      Effort: Pulmonary effort is normal.      Breath sounds: Normal breath sounds. No wheezing. Abdominal:      General: Bowel sounds are normal. There is no distension. Palpations: Abdomen is soft. Tenderness: There is no abdominal tenderness. Musculoskeletal:      Cervical back: Normal range of motion and neck supple. Lumbar back: Decreased range of motion. Right knee: Tenderness present. Left knee: Tenderness present. Skin:     General: Skin is warm and dry. Findings: No rash. Psychiatric:         Mood and Affect: Mood is anxious. Judgment: Judgment is impulsive. Assessment:       Diagnosis Orders   1. Well adult exam  CBC    Lipid Panel    Comprehensive Metabolic Panel    Hemoglobin A1C    TSH with Reflex    PSA, Prostatic Specific Antigen   2.  Lumbar disc disease with radiculopathy, L5-S1 with sciatica  HYDROcodone-acetaminophen (NORCO)  MG per tablet    HYDROcodone-acetaminophen (NORCO)  MG per tablet   3. Hx of lumbar discectomy, L5-S1, with one level fusion, 11/18/16. HYDROcodone-acetaminophen (NORCO)  MG per tablet    HYDROcodone-acetaminophen (NORCO)  MG per tablet   4. Primary osteoarthritis involving multiple joints  HYDROcodone-acetaminophen (NORCO)  MG per tablet    HYDROcodone-acetaminophen (NORCO)  MG per tablet   5. Opioid dependence with opioid-induced sexual dysfunction ([facility]     6. Hypercholesteremia     7. IFG (impaired fasting glucose)     8. Essential hypertension     9. PVD (peripheral vascular disease) ([facility]     10. Chronic anxiety             Plan:      Chronic conditions stable  Labs as above  Refills as above  Again discussion on lack of benefit of chronic opioid pain control     - high risk of tolerance to medication   - additional options discussed but denied  Patient is minimizing Xanax: 1-2 doses per day  Follow up with [facility]  Immunizations discussed - will get shingles vaccine next month  Labs now  RTO in 6 months          Current Outpatient Medications   Medication Sig Dispense Refill    [START ON 1/22/2022] HYDROcodone-acetaminophen (NORCO)  MG per tablet Take 1 tablet by mouth every 8 hours as needed for Pain for up to 30 days. Fill on or after 1/22/22 90 tablet 0    [START ON 2/21/2022] HYDROcodone-acetaminophen (NORCO)  MG per tablet Take 1 tablet by mouth every 8 hours as needed for Pain for up to 30 days. Fill on or after 2/21/22 90 tablet 0    lisinopril (PRINIVIL;ZESTRIL) 40 MG tablet TAKE 1 TABLET DAILY 90 tablet 1    atorvastatin (LIPITOR) 40 MG tablet TAKE 1 TABLET NIGHTLY 90 tablet 3    hydrOXYzine (ATARAX) 25 MG tablet Take 1 tablet by mouth 3 times daily as needed for Itching 270 tablet 1    ALPRAZolam (XANAX) 1 MG tablet Take 1 tablet by mouth 3 times daily as needed for Anxiety for up to 270 days.  [address]

## 2022-01-06 ENCOUNTER — TELEPHONE (OUTPATIENT)
Dept: FAMILY MEDICINE CLINIC | Age: 57
End: 2022-01-06

## 2022-01-06 NOTE — TELEPHONE ENCOUNTER
----- Message from Caridad Duane sent at 1/6/2022  3:52 PM EST -----  Subject: Message to Provider    QUESTIONS  Information for Provider? pt sated that whenever he blows his nose, he's   noticing blood in his dried nasal mucus. He would like to know what can be   suggested or if an antibiotic can be ordered  ---------------------------------------------------------------------------  --------------  CALL BACK INFO  What is the best way for the office to contact you? OK to leave message on   voicemail  Preferred Call Back Phone Number?  [phone]  ---------------------------------------------------------------------------  --------------  SCRIPT ANSWERS  undefined

## 2022-01-08 ENCOUNTER — NURSE ONLY (OUTPATIENT)
Dept: LAB | Age: 57
End: 2022-01-08

## 2022-01-08 DIAGNOSIS — Z00.00 WELL ADULT EXAM: ICD-10-CM

## 2022-01-08 DIAGNOSIS — E79.0 ELEVATED URIC ACID IN BLOOD: ICD-10-CM

## 2022-01-08 LAB
ALBUMIN SERPL-MCNC: 4.8 G/DL (ref 3.5–5.1)
ALP BLD-CCNC: 56 U/L (ref 38–126)
ALT SERPL-CCNC: 40 U/L (ref 11–66)
ANION GAP SERPL CALCULATED.3IONS-SCNC: 13 MEQ/L (ref 8–16)
AST SERPL-CCNC: 33 U/L (ref 5–40)
AVERAGE GLUCOSE: 123 MG/DL (ref 70–126)
BILIRUB SERPL-MCNC: 0.6 MG/DL (ref 0.3–1.2)
BILIRUBIN DIRECT: < 0.2 MG/DL (ref 0–0.3)
BUN BLDV-MCNC: 9 MG/DL (ref 7–22)
CALCIUM SERPL-MCNC: 9.4 MG/DL (ref 8.5–10.5)
CHLORIDE BLD-SCNC: 100 MEQ/L (ref 98–111)
CHOLESTEROL, TOTAL: 138 MG/DL (ref 100–199)
CO2: 27 MEQ/L (ref 23–33)
CREAT SERPL-MCNC: 0.6 MG/DL (ref 0.4–1.2)
ERYTHROCYTE [DISTWIDTH] IN BLOOD BY AUTOMATED COUNT: 13.1 % (ref 11.5–14.5)
ERYTHROCYTE [DISTWIDTH] IN BLOOD BY AUTOMATED COUNT: 44.5 FL (ref 35–45)
GFR SERPL CREATININE-BSD FRML MDRD: > 90 ML/MIN/1.73M2
GLUCOSE BLD-MCNC: 112 MG/DL (ref 70–108)
HBA1C MFR BLD: 6.1 % (ref 4.4–6.4)
HCT VFR BLD CALC: 44 % (ref 42–52)
HDLC SERPL-MCNC: 40 MG/DL
HEMOGLOBIN: 15.1 GM/DL (ref 14–18)
LDL CHOLESTEROL CALCULATED: 59 MG/DL
MCH RBC QN AUTO: 32.2 PG (ref 26–33)
MCHC RBC AUTO-ENTMCNC: 34.3 GM/DL (ref 32.2–35.5)
MCV RBC AUTO: 93.8 FL (ref 80–94)
PLATELET # BLD: 191 THOU/MM3 (ref 130–400)
PMV BLD AUTO: 9.7 FL (ref 9.4–12.4)
POTASSIUM SERPL-SCNC: 4.3 MEQ/L (ref 3.5–5.2)
PROSTATE SPECIFIC ANTIGEN: 0.74 NG/ML (ref 0–1)
RBC # BLD: 4.69 MILL/MM3 (ref 4.7–6.1)
SODIUM BLD-SCNC: 140 MEQ/L (ref 135–145)
TOTAL PROTEIN: 7 G/DL (ref 6.1–8)
TRIGL SERPL-MCNC: 194 MG/DL (ref 0–199)
TSH SERPL DL<=0.05 MIU/L-ACNC: 1.91 UIU/ML (ref 0.4–4.2)
URIC ACID: 9.6 MG/DL (ref 3.7–7)
WBC # BLD: 7.4 THOU/MM3 (ref 4.8–10.8)

## 2022-01-28 DIAGNOSIS — J30.2 ACUTE SEASONAL ALLERGIC RHINITIS: ICD-10-CM

## 2022-01-28 NOTE — TELEPHONE ENCOUNTER
Ok to take 2 tabs as needed but the reason for increase itching likely related to dry, cold air drying out his skin. Especially if taking frequent hot showers dries out skin more.   Recommend moistorizing wash and use of oil based moisturizer cream.   Humidifer in room at night also helpful

## 2022-01-28 NOTE — TELEPHONE ENCOUNTER
----- Message from Missy Gonzalez sent at 1/28/2022  9:31 AM EST -----  Subject: Medication Problem    QUESTIONS  Name of Medication? hydrOXYzine (ATARAX) 25 MG tablet  Patient-reported dosage and instructions? Take 1 tablet by mouth 3 times   daily as needed for Itching  What question or problem do you have with the medication? PT stating for   the past week it seems the med is not working. PT state its very itchy, no   hives/rash, no dry skin. PT states its because its so cold that the   itching increases. PT wanted to know if he should increase his meds or   give him something else. Preferred Pharmacy? [id number] - [place], [address] [place] [address],[address] [place] [address] - F [phone]  Pharmacy phone number (if available)? [phone]  Additional Information for Provider?   ---------------------------------------------------------------------------  --------------  [address]  What is the best way for the office to contact you? OK to leave message on   voicemail  Preferred Call Back Phone Number? [phone]  ---------------------------------------------------------------------------  --------------  SCRIPT ANSWERS  Relationship to Patient?  Self

## 2022-02-25 RX ORDER — TADALAFIL 20 MG/1
20 TABLET ORAL PRN
Qty: 30 TABLET | Refills: 3 | Status: SHIPPED | OUTPATIENT
Start: 2022-02-25 | End: 2023-02-25

## 2022-02-25 NOTE — TELEPHONE ENCOUNTER
Pt called office requesting a refill of his Cialis to [facility]. If no call back he will check with the pharmacy after 1pm. Refill if appropriate.

## 2022-03-07 ENCOUNTER — TELEPHONE (OUTPATIENT)
Dept: FAMILY MEDICINE CLINIC | Age: 57
End: 2022-03-07

## 2022-03-07 DIAGNOSIS — R73.01 IFG (IMPAIRED FASTING GLUCOSE): Primary | ICD-10-CM

## 2022-03-07 NOTE — TELEPHONE ENCOUNTER
Antoinette Hooker with new vision [facility]. A1C was not covered with dx codes of E79.0 or Z00.00. Please review and advise. Murray Soto would like a call back at [address] [place] [address] Rd: [address] [facility] [address].

## 2022-03-07 NOTE — TELEPHONE ENCOUNTER
Wife Brigette Brandon on HIPAA called office stating pt had some labs done on 1/8/22 and they received a bill for $70+ because the diagnosis did not pass for one of the tests. Pt had 10 different labs drawn that day. She will find out which test specifically needs a new diagnosis if there is one and call us back.

## 2022-03-07 NOTE — TELEPHONE ENCOUNTER
Bee Doan with new vision lab notified and voiced understanding. Bee Doan is asking for a new lab order with new code. Asking to fax it to him at [phone]. Fax cover sheet in nurse bin.

## 2022-03-10 ENCOUNTER — PATIENT MESSAGE (OUTPATIENT)
Dept: FAMILY MEDICINE CLINIC | Age: 57
End: 2022-03-10

## 2022-03-10 DIAGNOSIS — F41.9 ANXIETY: ICD-10-CM

## 2022-03-10 RX ORDER — INDOMETHACIN 50 MG/1
50 CAPSULE ORAL 3 TIMES DAILY PRN
Qty: 90 CAPSULE | Refills: 1 | Status: SHIPPED | OUTPATIENT
Start: 2022-03-10

## 2022-03-10 RX ORDER — ALPRAZOLAM 1 MG/1
1 TABLET ORAL 3 TIMES DAILY PRN
Qty: 270 TABLET | Refills: 1 | Status: SHIPPED | OUTPATIENT
Start: 2022-03-10 | End: 2022-12-05

## 2022-03-10 NOTE — TELEPHONE ENCOUNTER
From: Tommy Newton. To: Elisha Wolfe  Sent: 3/10/2022 6:27 AM EST  Subject: prescription refills    I missed the refill for my Alprazolam tab 1 mg, please send this to [facility] and I also need Indomethacin 50 mg cap. Please contact me with any questions or concerns. [phone].     Thank you,   Susan Bernard

## 2022-03-15 ENCOUNTER — NURSE TRIAGE (OUTPATIENT)
Dept: OTHER | Facility: CLINIC | Age: 57
End: 2022-03-15

## 2022-03-15 NOTE — TELEPHONE ENCOUNTER
Received call from Edith Palomares at [facility] with [employer] Complaint. Subjective: Caller states \"Left foot is swollen. \"     Current Symptoms: Left great toe joint is painful, red, and swollen. Patient reports he thinks it is his gout. Onset: 6 days ago; unchanged    Pain Severity: 3/10; throbbing; waxing and waning    Temperature: Denies    What has been tried: Ice, Stopped drinking alcohol and eating red meat, Allopurinol, Norco    Recommended disposition: See PCP within 3 Days    Care advice provided, patient verbalizes understanding; denies any other questions or concerns; instructed to call back for any new or worsening symptoms. Patient/Caller agrees with recommended disposition; writer provided warm transfer to Rita Jackman at [facility] for appointment scheduling     Attention Provider: Thank you for allowing me to participate in the care of your patient. The patient was connected to triage in response to information provided to the ECC/PSC. Please do not respond through this encounter as the response is not directed to a shared pool.         Reason for Disposition   Pain in the big toe joint    Protocols used: FOOT PAIN-ADULT-[place]

## 2022-03-16 ENCOUNTER — TELEPHONE (OUTPATIENT)
Dept: FAMILY MEDICINE CLINIC | Age: 57
End: 2022-03-16

## 2022-03-16 NOTE — TELEPHONE ENCOUNTER
Called and spoke with the patient, he states that he needs a late afternoon appt, no appts available until next week. The patient stated that he feels is toe is getting better and will call Friday Morning if he is not continuing to see improvement and scheduled for next week.

## 2022-03-16 NOTE — TELEPHONE ENCOUNTER
----- Message from Mona Casillas sent at 3/15/2022  8:35 AM EDT -----  Subject: Appointment Request    Reason for Call: Semi-Urgent Return from RN Triage    QUESTIONS  Type of Appointment? Established Patient  Reason for appointment request? Available appointments did not meet   patient need  Additional Information for Provider? RNT to be seen in next 3 days,   however Kenneth Quijano would like to come into the office in the afternoon as late   as possible so he doesn't have to take off of work. He is having gout on   left big toe pain, redness & swelling. Please call with an avail appt.  ---------------------------------------------------------------------------  --------------  CALL BACK INFO  What is the best way for the office to contact you? OK to leave message on   voicemail  Preferred Call Back Phone Number? [phone]  ---------------------------------------------------------------------------  --------------  SCRIPT ANSWERS  Patient needs to be seen within 3 days? Yes   Nurse Name? [facility]  Have you been diagnosed with, awaiting test results for, or told that you   are suspected of having COVID-19 (Coronavirus)? (If patient has tested   negative or was tested as a requirement for work, school, or travel and   not based on symptoms, answer no)? No  Within the past 10 days have you developed any of the following symptoms   (answer no if symptoms have been present longer than 10 days or began   more than 10 days ago)? Fever or Chills, Cough, Shortness of breath or   difficulty breathing, Loss of taste or smell, Sore throat, Nasal   congestion, Sneezing or runny nose, Fatigue or generalized body aches   (answer no if pain is specific to a body part e.g. back pain), Diarrhea,   Headache? No  Have you had close contact with someone with COVID-19 in the last 7 days? No  (Service Expert  click yes below to proceed with [employer] As Usual   Scheduling)?  Yes

## 2022-03-16 NOTE — TELEPHONE ENCOUNTER
----- Message from Brown Arango sent at 3/16/2022 12:46 PM EDT -----  Subject: Message to Provider    QUESTIONS  Information for Provider? pt calling in regards to his gout. states he has   pain pills and pills for the uric acid but is wondering if there is a   cream he can put on it as well. please advise [facility]  ---------------------------------------------------------------------------  --------------  January Camacho INFO  What is the best way for the office to contact you? OK to leave message on   voicemail  Preferred Call Back Phone Number? [phone]  ---------------------------------------------------------------------------  --------------  SCRIPT ANSWERS  Relationship to Patient?  Self

## 2022-03-21 ENCOUNTER — OFFICE VISIT (OUTPATIENT)
Dept: FAMILY MEDICINE CLINIC | Age: 57
End: 2022-03-21
Payer: COMMERCIAL

## 2022-03-21 VITALS
DIASTOLIC BLOOD PRESSURE: 70 MMHG | BODY MASS INDEX: 26.77 KG/M2 | SYSTOLIC BLOOD PRESSURE: 120 MMHG | WEIGHT: 219.9 LBS | HEART RATE: 68 BPM | RESPIRATION RATE: 16 BRPM

## 2022-03-21 DIAGNOSIS — M10.072 ACUTE IDIOPATHIC GOUT INVOLVING TOE OF LEFT FOOT: Primary | ICD-10-CM

## 2022-03-21 DIAGNOSIS — B35.4 TINEA CORPORIS: ICD-10-CM

## 2022-03-21 DIAGNOSIS — E79.0 ELEVATED URIC ACID IN BLOOD: ICD-10-CM

## 2022-03-21 PROCEDURE — 96372 THER/PROPH/DIAG INJ SC/IM: CPT | Performed by: NURSE PRACTITIONER

## 2022-03-21 PROCEDURE — 99213 OFFICE O/P EST LOW 20 MIN: CPT | Performed by: NURSE PRACTITIONER

## 2022-03-21 RX ORDER — METHYLPREDNISOLONE ACETATE 80 MG/ML
80 INJECTION, SUSPENSION INTRA-ARTICULAR; INTRALESIONAL; INTRAMUSCULAR; SOFT TISSUE ONCE
Status: COMPLETED | OUTPATIENT
Start: 2022-03-21 | End: 2022-03-21

## 2022-03-21 RX ORDER — ALLOPURINOL 300 MG/1
300 TABLET ORAL 2 TIMES DAILY
Qty: 180 TABLET | Refills: 3 | Status: SHIPPED | OUTPATIENT
Start: 2022-03-21 | End: 2022-04-04 | Stop reason: SDUPTHER

## 2022-03-21 RX ORDER — METHYLPREDNISOLONE ACETATE 40 MG/ML
40 INJECTION, SUSPENSION INTRA-ARTICULAR; INTRALESIONAL; INTRAMUSCULAR; SOFT TISSUE ONCE
Status: COMPLETED | OUTPATIENT
Start: 2022-03-21 | End: 2022-03-21

## 2022-03-21 RX ORDER — CLOTRIMAZOLE AND BETAMETHASONE DIPROPIONATE 10; .64 MG/G; MG/G
CREAM TOPICAL 2 TIMES DAILY
Qty: 45 G | Refills: 0 | Status: SHIPPED | OUTPATIENT
Start: 2022-03-21

## 2022-03-21 RX ADMIN — METHYLPREDNISOLONE ACETATE 80 MG: 80 INJECTION, SUSPENSION INTRA-ARTICULAR; INTRALESIONAL; INTRAMUSCULAR; SOFT TISSUE at 14:12

## 2022-03-21 RX ADMIN — METHYLPREDNISOLONE ACETATE 40 MG: 40 INJECTION, SUSPENSION INTRA-ARTICULAR; INTRALESIONAL; INTRAMUSCULAR; SOFT TISSUE at 14:11

## 2022-03-21 NOTE — PROGRESS NOTES
Patient was given Depo Medrol 120mg 2ml IM in left dorsogluteal per lawrence.barby Medeiros CNP. Patient tolerated well and without incident.       Administrations This Visit     methylPREDNISolone acetate (DEPO-MEDROL) injection 40 mg     Admin Date  03/21/2022 Action  Given Dose  40 mg Route  IntraMUSCular Administered By  Jillian Katz LPN          methylPREDNISolone acetate (DEPO-MEDROL) injection 80 mg     Admin Date  03/21/2022 Action  Given Dose  80 mg Route  IntraMUSCular Administered By  Jillian Katz LPN

## 2022-03-21 NOTE — PROGRESS NOTES
Nikolai Trevor. (1965) 60 y.o. male here for evaluation of the following chief complaint(s):      HPI:  Chief Complaint   Patient presents with    Gout     left great toe    Other     patch of rough skin on right foot     2 weeks ago started in with gout attack. Redness, swelling, heating. Pain has improved. Swelling and redness continued. Was to be on Allopurinol 300 mg BID - was only taking Daily. Alcohol intake. Lab Results   Component Value Date    URICACID 9.6 (H) 01/08/2022       Vitals:    03/21/22 1343   BP: 120/70   Pulse: 68   Resp: 16     Rash on right foot. Ring. Mild itching. Onset of 1 month. Rough patch. Patient Active Problem List   Diagnosis    HTN (hypertension)    Hypercholesteremia    Insomnia    Spine pain,  L-S- chronic pain    Varicose veins of both lower extremities    GERD (gastroesophageal reflux disease)    ED (erectile dysfunction)    Allergic rhinitis    Itchy skin    Medication monitoring encounter    Chronic cervical pain with radicular shoulder pain.  Chronic abdominal pain, reflux/heartburn.  Bilateral knee pain, rt>lt.  Lumbar disc disease with radiculopathy, L5-S1 with sciatica    Chronic anxiety    Hx of lumbar discectomy, L5-S1, with one level fusion, 11/18/16.  Primary osteoarthritis of right knee, bone on bone    Pain of right hip joint    Ventral hernia without obstruction or gangrene    Rectal bleeding    Acute GI bleeding    Hypovolemic shock (HCC)    Hemorrhagic shock (HCC)    Opioid dependence with opioid-induced sexual dysfunction ([address]    Acquired buried penis    History of operative procedure on lumbosacral spinal structure    Intermittent claudication ([address]    PVD (peripheral vascular disease) ([address]       SUBJECTIVE/OBJECTIVE:  Review of Systems   Constitutional: Negative for chills and fever. HENT: Negative. Respiratory: Negative for cough and shortness of breath.     Cardiovascular: Negative for chest pain. Gastrointestinal: Negative for abdominal pain and nausea. Musculoskeletal: Positive for arthralgias and joint swelling. Skin: Positive for rash. Neurological: Negative for dizziness, light-headedness and headaches. Psychiatric/Behavioral: Negative. Physical Exam  Constitutional:       General: He is not in acute distress. Eyes:      Pupils: Pupils are equal, round, and reactive to light. Cardiovascular:      Rate and Rhythm: Normal rate and regular rhythm. Heart sounds: No murmur heard. Pulmonary:      Effort: Pulmonary effort is normal.      Breath sounds: Normal breath sounds. No wheezing. Abdominal:      General: Bowel sounds are normal. There is no distension. Palpations: Abdomen is soft. Tenderness: There is no abdominal tenderness. Musculoskeletal:         General: No tenderness. Normal range of motion. Cervical back: Normal range of motion and neck supple. Feet:    Skin:     General: Skin is warm and dry. Findings: No rash. ASSESSMENT/PLAN:   Diagnosis Orders   1. Acute idiopathic gout involving toe of left foot  methylPREDNISolone acetate (DEPO-MEDROL) injection 80 mg    methylPREDNISolone acetate (DEPO-MEDROL) injection 40 mg   2. Tinea corporis  clotrimazole-betamethasone (LOTRISONE) 1-0.05 % cream   3. Elevated uric acid in blood  allopurinol (ZYLOPRIM) 300 MG tablet         MDM: DEPO 120 IM   Allopurinol 300 mg BID   Cream for rash   KNA      An electronic signature was used to authenticate this note.     --Bradley Shoulder, APRN - CNP

## 2022-03-22 ASSESSMENT — ENCOUNTER SYMPTOMS
NAUSEA: 0
COUGH: 0
ABDOMINAL PAIN: 0
SHORTNESS OF BREATH: 0

## 2022-03-29 ENCOUNTER — OFFICE VISIT (OUTPATIENT)
Dept: FAMILY MEDICINE CLINIC | Age: 57
End: 2022-03-29
Payer: COMMERCIAL

## 2022-03-29 VITALS
WEIGHT: 218.7 LBS | HEART RATE: 96 BPM | SYSTOLIC BLOOD PRESSURE: 122 MMHG | RESPIRATION RATE: 20 BRPM | BODY MASS INDEX: 26.62 KG/M2 | DIASTOLIC BLOOD PRESSURE: 54 MMHG

## 2022-03-29 DIAGNOSIS — M1A.4721 CHRONIC GOUT DUE TO OTHER SECONDARY CAUSE INVOLVING TOE OF LEFT FOOT WITH TOPHUS: Primary | ICD-10-CM

## 2022-03-29 PROCEDURE — 99213 OFFICE O/P EST LOW 20 MIN: CPT | Performed by: NURSE PRACTITIONER

## 2022-03-30 ASSESSMENT — ENCOUNTER SYMPTOMS
COUGH: 0
SHORTNESS OF BREATH: 0
ABDOMINAL PAIN: 0
NAUSEA: 0

## 2022-03-30 NOTE — PROGRESS NOTES
Jigar Mahoney. (1965) 60 y.o. male here for evaluation of the following chief complaint(s):      HPI:  Chief Complaint   Patient presents with    Other     left big toe \"squishy\"     3 weeks ago started in with gout attack. Redness, swelling, heating. Given a DEPO 120 IM 1 week ago. Pain, heating has resolved. Still swelling and fluid like on medial side of toe. No pain. Known bunion. Was increased to Allopurinol 300 mg BID. Alcohol intake. Noted improvement in joint pain. Lab Results   Component Value Date    URICACID 9.6 (H) 01/08/2022       Vitals:    03/29/22 1516   BP: (!) 122/54   Pulse: 96   Resp: 20     Rash on right foot. Ring. Mild itching. Onset of 1 month. Rough patch. Patient Active Problem List   Diagnosis    HTN (hypertension)    Hypercholesteremia    Insomnia    Spine pain,  L-S- chronic pain    Varicose veins of both lower extremities    GERD (gastroesophageal reflux disease)    ED (erectile dysfunction)    Allergic rhinitis    Itchy skin    Medication monitoring encounter    Chronic cervical pain with radicular shoulder pain.  Chronic abdominal pain, reflux/heartburn.  Bilateral knee pain, rt>lt.  Lumbar disc disease with radiculopathy, L5-S1 with sciatica    Chronic anxiety    Hx of lumbar discectomy, L5-S1, with one level fusion, 11/18/16.  Primary osteoarthritis of right knee, bone on bone    Pain of right hip joint    Ventral hernia without obstruction or gangrene    Rectal bleeding    Acute GI bleeding    Hypovolemic shock (HCC)    Hemorrhagic shock (HCC)    Opioid dependence with opioid-induced sexual dysfunction ([address]    Acquired buried penis    History of operative procedure on lumbosacral spinal structure    Intermittent claudication ([address]    PVD (peripheral vascular disease) ([address]       SUBJECTIVE/OBJECTIVE:  Review of Systems   Constitutional: Negative for chills and fever. HENT: Negative.     Respiratory: Negative for cough and shortness of breath. Cardiovascular: Negative for chest pain. Gastrointestinal: Negative for abdominal pain and nausea. Musculoskeletal: Positive for arthralgias and joint swelling. Skin: Positive for rash. Neurological: Negative for dizziness, light-headedness and headaches. Psychiatric/Behavioral: Negative. Physical Exam  Constitutional:       General: He is not in acute distress. Eyes:      Pupils: Pupils are equal, round, and reactive to light. Cardiovascular:      Rate and Rhythm: Normal rate and regular rhythm. Heart sounds: No murmur heard. Pulmonary:      Effort: Pulmonary effort is normal.      Breath sounds: Normal breath sounds. No wheezing. Abdominal:      General: Bowel sounds are normal. There is no distension. Palpations: Abdomen is soft. Tenderness: There is no abdominal tenderness. Musculoskeletal:         General: No tenderness. Normal range of motion. Cervical back: Normal range of motion and neck supple. Feet:    Skin:     General: Skin is warm and dry. Findings: No rash. ASSESSMENT/PLAN:   Diagnosis Orders   1. 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     --Nayana Champagne, IVANNA - CNP

## 2022-04-03 ENCOUNTER — PATIENT MESSAGE (OUTPATIENT)
Dept: FAMILY MEDICINE CLINIC | Age: 57
End: 2022-04-03

## 2022-04-03 DIAGNOSIS — E79.0 ELEVATED URIC ACID IN BLOOD: ICD-10-CM

## 2022-04-04 RX ORDER — ALLOPURINOL 300 MG/1
300 TABLET ORAL 2 TIMES DAILY
Qty: 180 TABLET | Refills: 3 | Status: SHIPPED | OUTPATIENT
Start: 2022-04-04 | End: 2022-04-08 | Stop reason: SDUPTHER

## 2022-04-04 NOTE — TELEPHONE ENCOUNTER
From: Ady Salazar. To: Lamin Garcia  Sent: 4/3/2022 8:11 PM EDT  Subject: Prescription refill    Please send a refill for Allopurinol to [facility] here in [place] you have increased this to 2 tablets a day.      Thank you,  Shyla Salinas

## 2022-04-08 DIAGNOSIS — E79.0 ELEVATED URIC ACID IN BLOOD: ICD-10-CM

## 2022-04-08 RX ORDER — ALLOPURINOL 300 MG/1
300 TABLET ORAL 2 TIMES DAILY
Qty: 180 TABLET | Refills: 3 | Status: SHIPPED | OUTPATIENT
Start: 2022-04-08 | End: 2022-08-16 | Stop reason: SDUPTHER

## 2022-04-08 NOTE — TELEPHONE ENCOUNTER
The patient called and stated that he needs his allopurinol sent to [facility] since it is a long term script. Order pended for your signature. The patient is aware that if no call back the script was sent.

## 2022-04-21 RX ORDER — LISINOPRIL 40 MG/1
TABLET ORAL
Qty: 90 TABLET | Refills: 3 | Status: SHIPPED | OUTPATIENT
Start: 2022-04-21

## 2022-04-25 DIAGNOSIS — Z98.890 HX OF LUMBAR DISCECTOMY: ICD-10-CM

## 2022-04-25 DIAGNOSIS — M15.9 PRIMARY OSTEOARTHRITIS INVOLVING MULTIPLE JOINTS: ICD-10-CM

## 2022-04-25 DIAGNOSIS — M51.16 LUMBAR DISC DISEASE WITH RADICULOPATHY: ICD-10-CM

## 2022-04-25 RX ORDER — HYDROCODONE BITARTRATE AND ACETAMINOPHEN 10; 325 MG/1; MG/1
1 TABLET ORAL EVERY 8 HOURS PRN
Qty: 90 TABLET | Refills: 0 | Status: SHIPPED | OUTPATIENT
Start: 2022-04-25 | End: 2022-05-24 | Stop reason: SDUPTHER

## 2022-04-25 NOTE — TELEPHONE ENCOUNTER
Patient calling and requesting refill of Norco to [facility].   Will check with pharmacy after 2pm.  Please refill if appropriate

## 2022-05-24 ENCOUNTER — TELEPHONE (OUTPATIENT)
Dept: FAMILY MEDICINE CLINIC | Age: 57
End: 2022-05-24

## 2022-05-24 DIAGNOSIS — M51.16 LUMBAR DISC DISEASE WITH RADICULOPATHY: ICD-10-CM

## 2022-05-24 DIAGNOSIS — Z98.890 HX OF LUMBAR DISCECTOMY: ICD-10-CM

## 2022-05-24 DIAGNOSIS — M15.9 PRIMARY OSTEOARTHRITIS INVOLVING MULTIPLE JOINTS: ICD-10-CM

## 2022-05-24 RX ORDER — HYDROCODONE BITARTRATE AND ACETAMINOPHEN 10; 325 MG/1; MG/1
1 TABLET ORAL EVERY 8 HOURS PRN
Qty: 90 TABLET | Refills: 0 | Status: SHIPPED | OUTPATIENT
Start: 2022-05-25 | End: 2022-06-24 | Stop reason: SDUPTHER

## 2022-05-24 NOTE — TELEPHONE ENCOUNTER
----- Message from Bernice Ball sent at 5/24/2022 11:13 AM EDT -----  Subject: Refill Request    QUESTIONS  Name of Medication? HYDROcodone-acetaminophen (NORCO)  MG per tablet  Patient-reported dosage and instructions?  MG prn 3 times per day   How many days do you have left? 2  Preferred Pharmacy? [address]  Pharmacy phone number (if available)? [phone]  ---------------------------------------------------------------------------  --------------  CALL BACK INFO  What is the best way for the office to contact you? OK to leave message on   voicemail  Preferred Call Back Phone Number? [phone]  ---------------------------------------------------------------------------  --------------  SCRIPT ANSWERS  Relationship to Patient?  Self

## 2022-06-07 ENCOUNTER — TELEPHONE (OUTPATIENT)
Dept: FAMILY MEDICINE CLINIC | Age: 57
End: 2022-06-07

## 2022-06-07 DIAGNOSIS — M15.9 PRIMARY OSTEOARTHRITIS INVOLVING MULTIPLE JOINTS: ICD-10-CM

## 2022-06-07 DIAGNOSIS — M1A.4721 CHRONIC GOUT DUE TO OTHER SECONDARY CAUSE INVOLVING TOE OF LEFT FOOT WITH TOPHUS: Primary | ICD-10-CM

## 2022-06-07 DIAGNOSIS — E79.0 ELEVATED URIC ACID IN BLOOD: ICD-10-CM

## 2022-06-07 RX ORDER — PREDNISONE 50 MG/1
50 TABLET ORAL DAILY
Qty: 4 TABLET | Refills: 0 | Status: SHIPPED | OUTPATIENT
Start: 2022-06-07 | End: 2022-06-11

## 2022-06-07 NOTE — TELEPHONE ENCOUNTER
The patient called and stated that he has redness and swelling to his left great toe with no pain yet. He states that he is taking the allopurinol 300mg but not using the indomethacin 50mg. He states that he is about to go on vacation and does not want a flare up during that time and is requesting some prednisone be sent into [facility] for this. He is also requesting a referral to Dr. Justo Amso. He is aware that there office will call him to schedule. If no call back the patient will check with his pharmacy after 6pm today.

## 2022-06-23 NOTE — TELEPHONE ENCOUNTER
Steffi with Dr. Yudtih Lai office called stating they received the referral from our office and will reach out to the pt to schedule.

## 2022-06-24 ENCOUNTER — PATIENT MESSAGE (OUTPATIENT)
Dept: FAMILY MEDICINE CLINIC | Age: 57
End: 2022-06-24

## 2022-06-24 DIAGNOSIS — M51.16 LUMBAR DISC DISEASE WITH RADICULOPATHY: ICD-10-CM

## 2022-06-24 DIAGNOSIS — Z98.890 HX OF LUMBAR DISCECTOMY: ICD-10-CM

## 2022-06-24 DIAGNOSIS — M15.9 PRIMARY OSTEOARTHRITIS INVOLVING MULTIPLE JOINTS: ICD-10-CM

## 2022-06-24 RX ORDER — HYDROCODONE BITARTRATE AND ACETAMINOPHEN 10; 325 MG/1; MG/1
1 TABLET ORAL EVERY 8 HOURS PRN
Qty: 90 TABLET | Refills: 0 | Status: SHIPPED | OUTPATIENT
Start: 2022-06-24 | End: 2022-09-22 | Stop reason: SDUPTHER

## 2022-06-24 NOTE — TELEPHONE ENCOUNTER
From: Lasha Teague. To: Cait Elaineast  Sent: 6/24/2022 10:57 AM EDT  Subject: Prescription refill    Please send a refill for Iselin to [facility].     Thank you   Osmel Anne

## 2022-06-28 ENCOUNTER — OFFICE VISIT (OUTPATIENT)
Dept: FAMILY MEDICINE CLINIC | Age: 57
End: 2022-06-28
Payer: COMMERCIAL

## 2022-06-28 VITALS
BODY MASS INDEX: 27.24 KG/M2 | SYSTOLIC BLOOD PRESSURE: 136 MMHG | RESPIRATION RATE: 18 BRPM | DIASTOLIC BLOOD PRESSURE: 68 MMHG | HEART RATE: 88 BPM | WEIGHT: 223.8 LBS

## 2022-06-28 DIAGNOSIS — M51.16 LUMBAR DISC DISEASE WITH RADICULOPATHY: ICD-10-CM

## 2022-06-28 DIAGNOSIS — M15.9 PRIMARY OSTEOARTHRITIS INVOLVING MULTIPLE JOINTS: ICD-10-CM

## 2022-06-28 DIAGNOSIS — Z00.00 WELL ADULT EXAM: Primary | ICD-10-CM

## 2022-06-28 DIAGNOSIS — I10 PRIMARY HYPERTENSION: ICD-10-CM

## 2022-06-28 DIAGNOSIS — F41.9 ANXIETY: ICD-10-CM

## 2022-06-28 DIAGNOSIS — E79.0 ELEVATED URIC ACID IN BLOOD: ICD-10-CM

## 2022-06-28 DIAGNOSIS — M1A.4721 CHRONIC GOUT DUE TO OTHER SECONDARY CAUSE INVOLVING TOE OF LEFT FOOT WITH TOPHUS: ICD-10-CM

## 2022-06-28 DIAGNOSIS — Z98.890 HX OF LUMBAR DISCECTOMY: ICD-10-CM

## 2022-06-28 DIAGNOSIS — E78.2 MIXED HYPERLIPIDEMIA: ICD-10-CM

## 2022-06-28 DIAGNOSIS — F11.281: ICD-10-CM

## 2022-06-28 PROCEDURE — 99396 PREV VISIT EST AGE 40-64: CPT | Performed by: NURSE PRACTITIONER

## 2022-06-28 RX ORDER — HYDROCODONE BITARTRATE AND ACETAMINOPHEN 10; 325 MG/1; MG/1
1 TABLET ORAL EVERY 8 HOURS PRN
Qty: 90 TABLET | Refills: 0 | Status: SHIPPED | OUTPATIENT
Start: 2022-08-23 | End: 2022-09-22 | Stop reason: SDUPTHER

## 2022-06-28 RX ORDER — HYDROCODONE BITARTRATE AND ACETAMINOPHEN 10; 325 MG/1; MG/1
1 TABLET ORAL EVERY 8 HOURS PRN
Qty: 90 TABLET | Refills: 0 | Status: SHIPPED | OUTPATIENT
Start: 2022-07-24 | End: 2022-09-22 | Stop reason: SDUPTHER

## 2022-06-28 SDOH — ECONOMIC STABILITY: FOOD INSECURITY: WITHIN THE PAST 12 MONTHS, YOU WORRIED THAT YOUR FOOD WOULD RUN OUT BEFORE YOU GOT MONEY TO BUY MORE.: NEVER TRUE

## 2022-06-28 SDOH — ECONOMIC STABILITY: FOOD INSECURITY: WITHIN THE PAST 12 MONTHS, THE FOOD YOU BOUGHT JUST DIDN'T LAST AND YOU DIDN'T HAVE MONEY TO GET MORE.: NEVER TRUE

## 2022-06-28 ASSESSMENT — ENCOUNTER SYMPTOMS
NAUSEA: 0
SHORTNESS OF BREATH: 0
COUGH: 0
ABDOMINAL PAIN: 0
BACK PAIN: 1

## 2022-06-28 ASSESSMENT — SOCIAL DETERMINANTS OF HEALTH (SDOH): HOW HARD IS IT FOR YOU TO PAY FOR THE VERY BASICS LIKE FOOD, HOUSING, MEDICAL CARE, AND HEATING?: NOT HARD AT ALL

## 2022-06-28 ASSESSMENT — PATIENT HEALTH QUESTIONNAIRE - PHQ9
SUM OF ALL RESPONSES TO PHQ9 QUESTIONS 1 & 2: 0
SUM OF ALL RESPONSES TO PHQ QUESTIONS 1-9: 0
SUM OF ALL RESPONSES TO PHQ QUESTIONS 1-9: 0
2. FEELING DOWN, DEPRESSED OR HOPELESS: 0
SUM OF ALL RESPONSES TO PHQ QUESTIONS 1-9: 0
1. LITTLE INTEREST OR PLEASURE IN DOING THINGS: 0
SUM OF ALL RESPONSES TO PHQ QUESTIONS 1-9: 0

## 2022-06-28 NOTE — PROGRESS NOTES
Subjective:      Patient ID: Arelis Scott. 1965 is a 60 y.o. male here for evaluation. Chief Complaint   Patient presents with    6 Month Follow-Up    Gout     left foot/great toe    Knee Pain     left       On Allopurinol 300 mg BID. Improved in gout control. Seen POD for left great toe - has had urate discharge expressed multiple time. Minimal pain. Lab Results   Component Value Date    URICACID 4.9 04/30/2022       Patient Active Problem List   Diagnosis    HTN (hypertension)    Hypercholesteremia    Insomnia    Spine pain,  L-S- chronic pain    Varicose veins of both lower extremities    GERD (gastroesophageal reflux disease)    ED (erectile dysfunction)    Allergic rhinitis    Itchy skin    Medication monitoring encounter    Chronic cervical pain with radicular shoulder pain.  Chronic abdominal pain, reflux/heartburn.  Bilateral knee pain, rt>lt.  Lumbar disc disease with radiculopathy, L5-S1 with sciatica    Chronic anxiety    Hx of lumbar discectomy, L5-S1, with one level fusion, 11/18/16.  Primary osteoarthritis of right knee, bone on bone    Pain of right hip joint    Ventral hernia without obstruction or gangrene    Rectal bleeding    Acute GI bleeding    Hypovolemic shock (HCC)    Hemorrhagic shock (HCC)    Opioid dependence with opioid-induced sexual dysfunction ([address]    Acquired buried penis    History of operative procedure on lumbosacral spinal structure    Intermittent claudication ([address]    PVD (peripheral vascular disease) ([address]       COLONOSCOPY - 2018    GASTRO - Dr. Andrew Nixon    Back surgery in 2020 and 2016 with hardware. He is on Norco  QID. Chronic back pain. Chronic joint pains. Hx of right partial knee replacement. Keeps him active and moving. Left knee starting to click. Mild pain.      Anxiety stable on use of Xanax 1 mg up to 3 times daily. Usually take no more than BID and tries to get away with 1 per day. #270 1R lasted him 9 months. Denies CP, SOB or chest tightness. Denies cramping in lower extremites with activity. Vitals:    06/28/22 1530   BP: 136/68   Pulse: 88   Resp: 18        On Lisinopril 40 mg.  BP wnl. BP Readings from Last 3 Encounters:   06/28/22 136/68   03/29/22 (!) 122/54   03/21/22 120/70       Lipitor 40 mg.  Labs reviewed    Lab Results   Component Value Date    LABA1C 6.1 01/08/2022    LABA1C 6.1 04/03/2021    LABA1C 5.8 05/05/2018     Lab Results   Component Value Date     05/05/2018       No components found for: CHLPL  Lab Results   Component Value Date    TRIG 194 01/08/2022    TRIG 270 (H) 09/30/2020    TRIG 228 (H) 11/02/2019     Lab Results   Component Value Date    HDL 40 01/08/2022    HDL 41 09/30/2020    HDL 39 (L) 11/02/2019     Lab Results   Component Value Date    LDLCALC 59 01/08/2022    LDLCALC 62 09/30/2020    LDLCALC 53 11/02/2019     No results found for: LABVLDL      Chemistry        Component Value Date/Time     01/08/2022 1147    K 4.3 01/08/2022 1147    K 3.9 10/22/2018 0208     01/08/2022 1147    CO2 27 01/08/2022 1147    BUN 9 01/08/2022 1147    CREATININE 0.6 01/08/2022 1147        Component Value Date/Time    CALCIUM 9.4 01/08/2022 1147    ALKPHOS 56 01/08/2022 1147    AST 33 01/08/2022 1147    ALT 40 01/08/2022 1147    BILITOT 0.6 01/08/2022 1147            Lab Results   Component Value Date    TSH 1.910 01/08/2022       Lab Results   Component Value Date    WBC 7.4 01/08/2022    HGB 15.1 01/08/2022    HCT 44.0 01/08/2022    MCV 93.8 01/08/2022     01/08/2022         Health Maintenance   Topic Date Due    COVID-19 Vaccine (1) Never done    HIV screen  Never done    DTaP/Tdap/Td vaccine (1 - Tdap) Never done    Shingles vaccine (1 of 2) Never done    Flu vaccine (Season Ended) 09/01/2022    A1C test (Diabetic or Prediabetic)  01/08/2023    Lipids  01/08/2023    Prostate Specific Antigen (PSA) Screening or Monitoring 01/08/2023    Depression Screen  06/28/2023    Colorectal Cancer Screen  01/17/2032    Hepatitis C screen  Completed    Hepatitis A vaccine  Aged Out    Hepatitis B vaccine  Aged Out    Hib vaccine  Aged Out    Meningococcal (ACWY) vaccine  Aged Out    Pneumococcal 0-64 years Vaccine  Aged [employer] History   Administered Date(s) Administered    Influenza Vaccine, unspecified formulation 10/22/2015, 10/01/2016    Influenza Virus Vaccine 10/20/2019    Influenza, Quadv, 6 mo and older, IM, PF (Flulaval, Fluarix) 10/23/2018    Influenza, Quadv, IM, PF (6 mo and older Fluzone, Flulaval, Fluarix, and 3 yrs and older Afluria) 10/28/2017, 10/20/2019, 10/25/2020       Review of Systems   Constitutional: Negative for chills and fever. HENT: Negative. Respiratory: Negative for cough and shortness of breath. Cardiovascular: Negative for chest pain. Gastrointestinal: Negative for abdominal pain and nausea. Musculoskeletal: Positive for arthralgias and back pain. Skin: Negative for rash. Neurological: Negative for dizziness, light-headedness and headaches. Psychiatric/Behavioral: The patient is nervous/anxious. Objective:   Physical Exam  Constitutional:       General: He is not in acute distress. Eyes:      Pupils: Pupils are equal, round, and reactive to light. Cardiovascular:      Rate and Rhythm: Normal rate and regular rhythm. Heart sounds: No murmur heard. Pulmonary:      Effort: Pulmonary effort is normal.      Breath sounds: Normal breath sounds. No wheezing. Abdominal:      General: Bowel sounds are normal. There is no distension. Palpations: Abdomen is soft. Tenderness: There is no abdominal tenderness. Musculoskeletal:      Cervical back: Normal range of motion and neck supple. Lumbar back: Decreased range of motion. Right knee: Tenderness present. Left knee: Tenderness (crepitus) present.         Feet:    Skin:     General: Skin is warm and dry. Findings: No rash. Psychiatric:         Mood and Affect: Mood is anxious. Judgment: Judgment is impulsive. Assessment:       Diagnosis Orders   1. Well adult exam     2. Lumbar disc disease with radiculopathy, L5-S1 with sciatica  HYDROcodone-acetaminophen (NORCO)  MG per tablet    HYDROcodone-acetaminophen (NORCO)  MG per tablet   3. Hx of lumbar discectomy, L5-S1, with one level fusion, 11/18/16. HYDROcodone-acetaminophen (NORCO)  MG per tablet    HYDROcodone-acetaminophen (NORCO)  MG per tablet   4. Primary osteoarthritis involving multiple joints  HYDROcodone-acetaminophen (NORCO)  MG per tablet    HYDROcodone-acetaminophen (NORCO)  MG per tablet   5. Opioid dependence with opioid-induced sexual dysfunction ([facility]     6. 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 Elevated uric acid in blood     8. Primary hypertension     9. Mixed hyperlipidemia     10. Anxiety             Plan:      Chronic conditions stable  Labs reviewed  Refills as above  Again discussion on lack of benefit of chronic opioid pain control     - high risk of tolerance to medication   - additional options discussed but denied  Patient is minimizing Xanax: 1-2 doses per day  Follow up with Specialists  Immunizations discussed   Labs in Fall at [address] [id number] in 6 months          Current Outpatient Medications   Medication Sig Dispense Refill    [START ON 8/23/2022] HYDROcodone-acetaminophen (NORCO)  MG per tablet Take 1 tablet by mouth every 8 hours as needed for Pain for up to 30 days. Fill on or after 8/23/22 90 tablet 0    [START ON 7/24/2022] HYDROcodone-acetaminophen (NORCO)  MG per tablet Take 1 tablet by mouth every 8 hours as needed for Pain for up to 30 days.  Fill on or after 7/24/22 90 tablet 0    HYDROcodone-acetaminophen (NORCO)  MG per tablet Take 1 tablet by mouth every 8 hours as needed for Pain for up to 30 days. Fill on or after 6/24/22 90 tablet 0    lisinopril (PRINIVIL;ZESTRIL) 40 MG tablet TAKE 1 TABLET DAILY 90 tablet 3    allopurinol (ZYLOPRIM) 300 MG tablet Take 1 tablet by mouth 2 times daily 180 tablet 3    clotrimazole-betamethasone (LOTRISONE) 1-0.05 % cream Apply topically 2 times daily 45 g 0    ALPRAZolam (XANAX) 1 MG tablet Take 1 tablet by mouth 3 times daily as needed for Anxiety for up to 270 days. 270 tablet 1    indomethacin (INDOCIN) 50 MG capsule Take 1 capsule by mouth 3 times daily as needed for Pain Take with food. 90 capsule 1    tadalafil (CIALIS) 20 MG tablet Take 1 tablet by mouth as needed for Erectile Dysfunction 30 tablet 3    atorvastatin (LIPITOR) 40 MG tablet TAKE 1 TABLET NIGHTLY 90 tablet 3    hydrOXYzine (ATARAX) 25 MG tablet Take 1 tablet by mouth 3 times daily as needed for Itching 270 tablet 1    Misc Natural Products (TART CHERRY ADVANCED PO) Take 3,000 mg by mouth      omeprazole (PRILOSEC) 20 MG delayed release capsule       Dermatological Products, Misc. (EPICERAM) EMUL APPLY TO THE AFFECTED AREA(S) DAILY      CALCIUM CARBONATE-VITAMIN D PO Take 2 tablets by mouth daily Calcium carbonate 600 mg tablet (total from taking 2 tablets is 1200 mg), unsure of vitamin D dose      B Complex Vitamins (VITAMIN B COMPLEX PO) Take 1 tablet by mouth daily One daily       Multiple Vitamins-Minerals (MULTIVITAMIN & MINERAL PO) Take 1 tablet by mouth daily One daily--one bottle refill for one year        No current facility-administered medications for this visit.

## 2022-06-28 NOTE — PATIENT INSTRUCTIONS
You may receive a survey regarding the care you received during your visit. Your input is valuable to us. We encourage you to complete and return your survey. We hope you will choose us in the future for your healthcare needs. Patient Education        A Healthy Lifestyle: Care Instructions  Your Care Instructions     A healthy lifestyle can help you feel good, stay at a healthy weight, and have plenty of energy for both work and play. A healthy lifestyle is something youcan share with your whole family. A healthy lifestyle also can lower your risk for serious health problems, suchas high blood pressure, heart disease, and diabetes. You can follow a few steps listed below to improve your health and the healthof your family. Follow-up care is a key part of your treatment and safety. Be sure to make and go to all appointments, and call your doctor if you are having problems. It's also a good idea to know your test results and keep alist of the medicines you take. How can you care for yourself at home?  Do not eat too much sugar, fat, or fast foods. You can still have dessert and treats now and then. The goal is moderation.  Start small to improve your eating habits. Pay attention to portion sizes, drink less juice and soda pop, and eat more fruits and vegetables. ? Eat a healthy amount of food. A 3-ounce serving of meat, for example, is about the size of a deck of cards. Fill the rest of your plate with vegetables and whole grains. ? Limit the amount of soda and sports drinks you have every day. Drink more water when you are thirsty. ? Eat plenty of fruits and vegetables every day. Have an apple or some carrot sticks as an afternoon snack instead of a candy bar. Try to have fruits and/or vegetables at every meal.   Make exercise part of your daily routine. You may want to start with simple activities, such as walking, bicycling, or slow swimming. Try to be active 30 to 60 minutes every day.  You do not need to do all 30 to 60 minutes all at once. For example, you can exercise 3 times a day for 10 or 20 minutes. Moderate exercise is safe for most people, but it is always a good idea to talk to your doctor before starting an exercise program.   Keep moving. Gagan Ramires the lawn, work in the garden, or M2G. Take the stairs instead of the elevator at work.  If you smoke, quit. People who smoke have an increased risk for heart attack, stroke, cancer, and other lung illnesses. Quitting is hard, but there are ways to boost your chance of quitting tobacco for good. ? Use nicotine gum, patches, or lozenges. ? Ask your doctor about stop-smoking programs and medicines. ? Keep trying. In addition to reducing your risk of diseases in the future, you will notice some benefits soon after you stop using tobacco. If you have shortness of breath or asthma symptoms, they will likely get better within a few weeks after you quit.  Limit how much alcohol you drink. Moderate amounts of alcohol (up to 2 drinks a day for men, 1 drink a day for women) are okay. But drinking too much can lead to liver problems, high blood pressure, and other health problems. Family health  If you have a family, there are many things you can do together to improve yourhealth.  Eat meals together as a family as often as possible.  Eat healthy foods. This includes fruits, vegetables, lean meats and dairy, and whole grains.  Include your family in your fitness plan. Most people think of activities such as jogging or tennis as the way to fitness, but there are many ways you and your family can be more active. Anything that makes you breathe hard and gets your heart pumping is exercise. Here are some tips:  ? Walk to do errands or to take your child to school or the bus.  ? Go for a family bike ride after dinner instead of watching TV. Where can you learn more? Go to [email or web address]://[facility]. org and sign in to your MyChart account. Enter R155 in the [facility] box to learn more about \"A Healthy Lifestyle: Care Instructions. \"     If you do not have an account, please click on the \"Sign Up Now\" link. Current as of: February 9, 2022               Content Version: 13.3  © 8551-1635 [employer]wise, Incorporated. Care instructions adapted under license by [facility] ([facility]). If you have questions about a medical condition or this instruction, always ask your healthcare professional. [address] any warranty or liability for your use of this information.

## 2022-08-15 DIAGNOSIS — E79.0 ELEVATED URIC ACID IN BLOOD: ICD-10-CM

## 2022-08-15 NOTE — TELEPHONE ENCOUNTER
Pt called office requesting a refill of his Allopurinol 90day supply to [facility]. Says it's cheaper to get it at [place] than through his mail in [address] [place] [address]. If no call back pt will check with the pharmacy tomorrow morning. Refill if appropriate.

## 2022-08-16 RX ORDER — ALLOPURINOL 300 MG/1
300 TABLET ORAL 2 TIMES DAILY
Qty: 180 TABLET | Refills: 3 | Status: SHIPPED | OUTPATIENT
Start: 2022-08-16

## 2022-09-22 DIAGNOSIS — M15.9 PRIMARY OSTEOARTHRITIS INVOLVING MULTIPLE JOINTS: ICD-10-CM

## 2022-09-22 DIAGNOSIS — M51.16 LUMBAR DISC DISEASE WITH RADICULOPATHY: ICD-10-CM

## 2022-09-22 DIAGNOSIS — Z98.890 HX OF LUMBAR DISCECTOMY: ICD-10-CM

## 2022-09-22 DIAGNOSIS — E79.0 ELEVATED URIC ACID IN BLOOD: ICD-10-CM

## 2022-09-22 RX ORDER — HYDROCODONE BITARTRATE AND ACETAMINOPHEN 10; 325 MG/1; MG/1
1 TABLET ORAL EVERY 8 HOURS PRN
Qty: 90 TABLET | Refills: 0 | Status: SHIPPED | OUTPATIENT
Start: 2022-10-22 | End: 2022-11-21

## 2022-09-22 RX ORDER — HYDROCODONE BITARTRATE AND ACETAMINOPHEN 10; 325 MG/1; MG/1
1 TABLET ORAL EVERY 8 HOURS PRN
Qty: 90 TABLET | Refills: 0 | Status: SHIPPED | OUTPATIENT
Start: 2022-11-21 | End: 2022-12-21

## 2022-09-22 RX ORDER — HYDROCODONE BITARTRATE AND ACETAMINOPHEN 10; 325 MG/1; MG/1
1 TABLET ORAL EVERY 8 HOURS PRN
Qty: 90 TABLET | Refills: 0 | Status: SHIPPED | OUTPATIENT
Start: 2022-09-22 | End: 2022-10-22

## 2022-09-22 NOTE — TELEPHONE ENCOUNTER
Willa Leger called requesting a refill of the below medication which has been pended for you:     Requested Prescriptions     Pending Prescriptions Disp Refills    HYDROcodone-acetaminophen (NORCO)  MG per tablet 90 tablet 0     Sig: Take 1 tablet by mouth every 8 hours as needed for Pain for up to 30 days.  Fill on or after 8/23/22       Last Appointment Date: 6/28/2022  Next Appointment Date: 12/28/2022    No Known Allergies      If no call back patient will check with Nino Maria at 4:30 pm.

## 2022-10-06 ENCOUNTER — OFFICE VISIT (OUTPATIENT)
Dept: FAMILY MEDICINE CLINIC | Age: 57
End: 2022-10-06
Payer: COMMERCIAL

## 2022-10-06 VITALS
BODY MASS INDEX: 27.09 KG/M2 | OXYGEN SATURATION: 98 % | HEIGHT: 76 IN | DIASTOLIC BLOOD PRESSURE: 60 MMHG | RESPIRATION RATE: 13 BRPM | HEART RATE: 67 BPM | SYSTOLIC BLOOD PRESSURE: 128 MMHG | WEIGHT: 222.5 LBS

## 2022-10-06 DIAGNOSIS — N45.1 EPIDIDYMITIS, RIGHT: Primary | ICD-10-CM

## 2022-10-06 PROCEDURE — 99213 OFFICE O/P EST LOW 20 MIN: CPT | Performed by: NURSE PRACTITIONER

## 2022-10-06 RX ORDER — CIPROFLOXACIN 500 MG/1
500 TABLET, FILM COATED ORAL 2 TIMES DAILY
Qty: 14 TABLET | Refills: 0 | Status: SHIPPED | OUTPATIENT
Start: 2022-10-06 | End: 2022-10-13 | Stop reason: SDUPTHER

## 2022-10-06 RX ORDER — NAPROXEN 500 MG/1
500 TABLET ORAL 2 TIMES DAILY WITH MEALS
Qty: 14 TABLET | Refills: 0 | Status: SHIPPED | OUTPATIENT
Start: 2022-10-06 | End: 2022-10-13 | Stop reason: SDUPTHER

## 2022-10-06 NOTE — PROGRESS NOTES
Matty Krishnan. (1965) 60 y.o. 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 No swelling. Will hurt when he is walking and gets pinched between legs. Denies pain with lifting or bowel movements. Denies pain in groin. Denies pain to direct palpation. Pain comes and goes. No urinary complaints. Hx of testicular torsion when he was 23. Vitals:    10/06/22 1148   BP: 128/60   Pulse: 67   Resp: 13   SpO2: 98%       Patient Active Problem List   Diagnosis    HTN (hypertension)    Hypercholesteremia    Insomnia    Spine pain,  L-S- chronic pain    Varicose veins of both lower extremities    GERD (gastroesophageal reflux disease)    ED (erectile dysfunction)    Allergic rhinitis    Itchy skin    Medication monitoring encounter    Chronic cervical pain with radicular shoulder pain. Chronic abdominal pain, reflux/heartburn. Bilateral knee pain, rt>lt. Lumbar disc disease with radiculopathy, L5-S1 with sciatica    Chronic anxiety    Hx of lumbar discectomy, L5-S1, with one level fusion, 11/18/16. Primary osteoarthritis of right knee, bone on bone    Pain of right hip joint    Ventral hernia without obstruction or gangrene    Rectal bleeding    Acute GI bleeding    Hypovolemic shock (HCC)    Hemorrhagic shock (HCC)    Opioid dependence with opioid-induced sexual dysfunction (HCC)    Acquired buried penis    History of operative procedure on lumbosacral spinal structure    Intermittent claudication (HCC)    PVD (peripheral vascular disease) (HCC)       SUBJECTIVE/OBJECTIVE:  Review of Systems   Constitutional:  Negative for chills and fever. HENT: Negative. Respiratory:  Negative for cough and shortness of breath.     Cardiovascular:  Negative

## 2022-10-07 ASSESSMENT — ENCOUNTER SYMPTOMS
ABDOMINAL PAIN: 0
NAUSEA: 0
COUGH: 0
SHORTNESS OF BREATH: 0

## 2022-10-13 ENCOUNTER — TELEPHONE (OUTPATIENT)
Dept: FAMILY MEDICINE CLINIC | Age: 57
End: 2022-10-13

## 2022-10-13 DIAGNOSIS — N45.1 EPIDIDYMITIS, RIGHT: ICD-10-CM

## 2022-10-13 RX ORDER — CIPROFLOXACIN 500 MG/1
500 TABLET, FILM COATED ORAL 2 TIMES DAILY
Qty: 14 TABLET | Refills: 0 | Status: SHIPPED | OUTPATIENT
Start: 2022-10-13 | End: 2022-10-20

## 2022-10-13 RX ORDER — NAPROXEN 500 MG/1
500 TABLET ORAL 2 TIMES DAILY WITH MEALS
Qty: 14 TABLET | Refills: 0 | Status: SHIPPED | OUTPATIENT
Start: 2022-10-13 | End: 2022-10-20

## 2022-10-13 NOTE — TELEPHONE ENCOUNTER
Patient calling due to testicular symptoms improved but still present. Requesting another round of ATB and NSAID. Pharmacy is [facility].   Will check with pharmacy after 4pm.  Please advise

## 2022-11-21 DIAGNOSIS — E78.00 HYPERCHOLESTEREMIA: ICD-10-CM

## 2022-11-21 RX ORDER — ATORVASTATIN CALCIUM 40 MG/1
TABLET, FILM COATED ORAL
Qty: 90 TABLET | Refills: 3 | Status: SHIPPED | OUTPATIENT
Start: 2022-11-21

## 2022-11-21 NOTE — TELEPHONE ENCOUNTER
The patient called in and is requesting a refill on his atorvatatin 40mg to go to [facility]. Order pended for your signature. If no call back the patient will know that the script was sent in.

## 2022-11-28 ENCOUNTER — OFFICE VISIT (OUTPATIENT)
Dept: FAMILY MEDICINE CLINIC | Age: 57
End: 2022-11-28
Payer: COMMERCIAL

## 2022-11-28 VITALS
BODY MASS INDEX: 27.58 KG/M2 | HEART RATE: 108 BPM | WEIGHT: 226.6 LBS | SYSTOLIC BLOOD PRESSURE: 152 MMHG | RESPIRATION RATE: 20 BRPM | DIASTOLIC BLOOD PRESSURE: 78 MMHG

## 2022-11-28 DIAGNOSIS — M51.16 LUMBAR DISC DISEASE WITH RADICULOPATHY: ICD-10-CM

## 2022-11-28 DIAGNOSIS — R10.31 RLQ ABDOMINAL PAIN: Primary | ICD-10-CM

## 2022-11-28 DIAGNOSIS — I10 PRIMARY HYPERTENSION: ICD-10-CM

## 2022-11-28 DIAGNOSIS — E78.2 MIXED HYPERLIPIDEMIA: ICD-10-CM

## 2022-11-28 DIAGNOSIS — I73.9 PVD (PERIPHERAL VASCULAR DISEASE) (HCC): ICD-10-CM

## 2022-11-28 DIAGNOSIS — Z98.890 HX OF LUMBAR DISCECTOMY: ICD-10-CM

## 2022-11-28 DIAGNOSIS — E79.0 ELEVATED URIC ACID IN BLOOD: ICD-10-CM

## 2022-11-28 DIAGNOSIS — F41.9 ANXIETY: ICD-10-CM

## 2022-11-28 DIAGNOSIS — M15.9 PRIMARY OSTEOARTHRITIS INVOLVING MULTIPLE JOINTS: ICD-10-CM

## 2022-11-28 DIAGNOSIS — E78.00 HYPERCHOLESTEREMIA: ICD-10-CM

## 2022-11-28 DIAGNOSIS — L30.9 DERMATITIS: ICD-10-CM

## 2022-11-28 DIAGNOSIS — Z23 NEED FOR SHINGLES VACCINE: ICD-10-CM

## 2022-11-28 PROCEDURE — 90750 HZV VACC RECOMBINANT IM: CPT | Performed by: NURSE PRACTITIONER

## 2022-11-28 PROCEDURE — 90471 IMMUNIZATION ADMIN: CPT | Performed by: NURSE PRACTITIONER

## 2022-11-28 PROCEDURE — 99214 OFFICE O/P EST MOD 30 MIN: CPT | Performed by: NURSE PRACTITIONER

## 2022-11-28 PROCEDURE — 3078F DIAST BP <80 MM HG: CPT | Performed by: NURSE PRACTITIONER

## 2022-11-28 PROCEDURE — 3074F SYST BP LT 130 MM HG: CPT | Performed by: NURSE PRACTITIONER

## 2022-11-28 RX ORDER — HYDROCODONE BITARTRATE AND ACETAMINOPHEN 10; 325 MG/1; MG/1
1 TABLET ORAL EVERY 8 HOURS PRN
Qty: 90 TABLET | Refills: 0 | Status: SHIPPED | OUTPATIENT
Start: 2022-12-21 | End: 2023-01-20

## 2022-11-28 RX ORDER — NYSTATIN 100000 U/G
CREAM TOPICAL 2 TIMES DAILY
COMMUNITY

## 2022-11-28 RX ORDER — ALPRAZOLAM 1 MG/1
1 TABLET ORAL 3 TIMES DAILY PRN
Qty: 270 TABLET | Refills: 1 | Status: SHIPPED | OUTPATIENT
Start: 2022-11-28 | End: 2022-11-28

## 2022-11-28 RX ORDER — EMOLLIENT COMBINATION NO.32
EMULSION, EXTENDED RELEASE TOPICAL
Qty: 225 G | Refills: 5 | Status: SHIPPED | OUTPATIENT
Start: 2022-11-28

## 2022-11-28 ASSESSMENT — ENCOUNTER SYMPTOMS
SHORTNESS OF BREATH: 0
NAUSEA: 0
BACK PAIN: 1
ABDOMINAL PAIN: 1
COUGH: 0

## 2022-11-28 NOTE — PROGRESS NOTES
Funmi Nuñez. (1965) 60 y.o. male here for evaluation of the following chief complaint(s):      HPI:  Chief Complaint   Patient presents with    Hernia     Pt thinks he has a possible hernia on the right side abdomen, when he bends over he feels some type of movement \"that area just doesn't feel right\". Pt did do some heavy lifting not too long ago and thinks that may have done it         There were no vitals filed for this visit. Patient Active Problem List   Diagnosis    HTN (hypertension)    Hypercholesteremia    Insomnia    Spine pain,  L-S- chronic pain    Varicose veins of both lower extremities    GERD (gastroesophageal reflux disease)    ED (erectile dysfunction)    Allergic rhinitis    Itchy skin    Medication monitoring encounter    Chronic cervical pain with radicular shoulder pain. Chronic abdominal pain, reflux/heartburn. Bilateral knee pain, rt>lt. Lumbar disc disease with radiculopathy, L5-S1 with sciatica    Chronic anxiety    Hx of lumbar discectomy, L5-S1, with one level fusion, 11/18/16. Primary osteoarthritis of right knee, bone on bone    Pain of right hip joint    Ventral hernia without obstruction or gangrene    Rectal bleeding    Acute GI bleeding    Hypovolemic shock (HCC)    Hemorrhagic shock (HCC)    Opioid dependence with opioid-induced sexual dysfunction (HCC)    Acquired buried penis    History of operative procedure on lumbosacral spinal structure    Intermittent claudication (HCC)    PVD (peripheral vascular disease) (HCC)       SUBJECTIVE/OBJECTIVE:  Review of Systems    Physical Exam      ASSESSMENT/PLAN:  {No diagnosis found. (Refresh or delete this SmartLink)}      MDM:      An electronic signature was used to authenticate this note.     --IVANNA Berger - CNP

## 2022-11-28 NOTE — PROGRESS NOTES
After obtaining consent, and per orders of Lazarus Belt CNP, injection of Shingrix 0.5ml given in Left deltoid by Juno Bullock CMA (AAMA). Patient instructed to report any adverse reaction to me immediately. Pt tolerated injection well.      Immunizations Administered       Name Date Dose Route    Zoster Recombinant (Shingrix) 11/28/2022 0.5 mL Intramuscular    Site: Deltoid- Left    Lot: [id number]    [address]: 08528-209-32

## 2022-11-29 ENCOUNTER — TELEPHONE (OUTPATIENT)
Dept: FAMILY MEDICINE CLINIC | Age: 57
End: 2022-11-29

## 2022-11-29 NOTE — PROGRESS NOTES
Subjective:      Patient ID: Gloris Dance. 1965 is a 60 y.o. male here for evaluation. Chief Complaint   Patient presents with    Hernia     Pt thinks he has a possible hernia on the right side abdomen, when he bends over he feels some type of movement \"that area just doesn't feel right\". Pt did do some heavy lifting not too long ago and thinks that may have done it     Was cutting and lifting wood 2-3 months ago. Since that time havign pain in right mid abdominal area with movement. Does not feel right in that area. Denies GI complaints. No hx of surgery in that area      Patient Active Problem List   Diagnosis    HTN (hypertension)    Hypercholesteremia    Insomnia    Spine pain,  L-S- chronic pain    Varicose veins of both lower extremities    GERD (gastroesophageal reflux disease)    ED (erectile dysfunction)    Allergic rhinitis    Itchy skin    Medication monitoring encounter    Chronic cervical pain with radicular shoulder pain. Chronic abdominal pain, reflux/heartburn. Bilateral knee pain, rt>lt. Lumbar disc disease with radiculopathy, L5-S1 with sciatica    Chronic anxiety    Hx of lumbar discectomy, L5-S1, with one level fusion, 11/18/16. Primary osteoarthritis of right knee, bone on bone    Pain of right hip joint    Ventral hernia without obstruction or gangrene    Rectal bleeding    Acute GI bleeding    Hypovolemic shock (HCC)    Hemorrhagic shock (HCC)    Opioid dependence with opioid-induced sexual dysfunction ([address]    Acquired buried penis    History of operative procedure on lumbosacral spinal structure    Intermittent claudication ([address]    PVD (peripheral vascular disease) ([address]       COLONOSCOPY - 2018    GASTRO - Dr. Liza Mojica    Back surgery in 2020 and 2016 with hardware. He is on Norco  QID. Chronic back pain. Chronic joint pains. Hx of right partial knee replacement. Keeps him active and moving. Left knee starting to click. Mild pain. Anxiety stable on use of Xanax 1 mg up to 3 times daily. Usually take no more than BID and tries to get away with 1 per day. #270 1R lasted him 9 months. Denies CP, SOB or chest tightness. Denies cramping in lower extremites with activity. On Allopurinol 300 mg BID. Improved in gout control. Seen POD for left great toe - has had urate discharge expressed multiple time. Minimal pain. Lab Results   Component Value Date    URICACID 4.2 (L) 11/05/2022       On Lisinopril 40 mg.  BP wnl. BP Readings from Last 3 Encounters:   11/28/22 (!) 152/78   10/06/22 128/60   06/28/22 136/68       Lipitor 40 mg.  Labs reviewed    Lab Results   Component Value Date    LABA1C 6.1 01/08/2022    LABA1C 6.1 04/03/2021    LABA1C 5.8 05/05/2018     Lab Results   Component Value Date     05/05/2018       No components found for: CHLPL  Lab Results   Component Value Date    TRIG 216 (H) 11/05/2022    TRIG 194 01/08/2022    TRIG 270 (H) 09/30/2020     Lab Results   Component Value Date    HDL 36 (L) 11/05/2022    HDL 40 01/08/2022    HDL 41 09/30/2020     Lab Results   Component Value Date    LDLCALC 55 11/05/2022    LDLCALC 59 01/08/2022    LDLCALC 62 09/30/2020     No results found for: LABVLDL      Chemistry        Component Value Date/Time     11/05/2022 0630    K 4.2 11/05/2022 0630    K 3.9 10/22/2018 0208     11/05/2022 0630    CO2 25 11/05/2022 0630    BUN 11 11/05/2022 0630    CREATININE 0.65 11/05/2022 0630        Component Value Date/Time    CALCIUM 9.50 11/05/2022 0630    ALKPHOS 34 (L) 11/05/2022 0630    AST 35 11/05/2022 0630    ALT 45 (H) 11/05/2022 0630    BILITOT 0.7 11/05/2022 0630            Lab Results   Component Value Date    TSH 1.910 01/08/2022       Lab Results   Component Value Date    WBC 4.6 11/05/2022    HGB 14.0 11/05/2022    HCT 40.9 11/05/2022    MCV 95.5 11/05/2022     11/05/2022         Health Maintenance   Topic Date Due    COVID-19 Vaccine (1) Never done HIV screen  Never done    DTaP/Tdap/Td vaccine (1 - Tdap) Never done    Flu vaccine (1) 08/01/2022    A1C test (Diabetic or Prediabetic)  01/08/2023    Shingles vaccine (2 of 2) 01/23/2023    Depression Screen  06/28/2023    Lipids  11/05/2023    Colorectal Cancer Screen  01/17/2032    Hepatitis C screen  Completed    Hepatitis A vaccine  Aged Out    Hib vaccine  Aged Out    Meningococcal (ACWY) vaccine  Aged Out    Pneumococcal 0-64 years Vaccine  Aged [employer] History   Administered Date(s) Administered    Influenza Vaccine, unspecified formulation 10/22/2015, 10/01/2016    Influenza Virus Vaccine 10/20/2019    Influenza, FLUARIX, FLULAVAL, FLUZONE (age 10 mo+) AND AFLURIA, (age 1 y+), PF, 0.5mL 10/28/2017, 10/20/2019, 10/25/2020    Influenza, Quadv, 6 mo and older, IM, PF (Flulaval, Fluarix) 10/23/2018    Zoster Recombinant (Shingrix) 11/28/2022       Review of Systems   Constitutional:  Negative for chills and fever. HENT: Negative. Respiratory:  Negative for cough and shortness of breath. Cardiovascular:  Negative for chest pain. Gastrointestinal:  Positive for abdominal pain. Negative for nausea. Musculoskeletal:  Positive for arthralgias and back pain. Skin:  Negative for rash. Neurological:  Negative for dizziness, light-headedness and headaches. Psychiatric/Behavioral:  The patient is nervous/anxious. Objective:   Physical Exam  Constitutional:       General: He is not in acute distress. Eyes:      Pupils: Pupils are equal, round, and reactive to light. Cardiovascular:      Rate and Rhythm: Normal rate and regular rhythm. Heart sounds: No murmur heard. Pulmonary:      Effort: Pulmonary effort is normal.      Breath sounds: Normal breath sounds. No wheezing. Abdominal:      General: Bowel sounds are normal. There is no distension. Palpations: Abdomen is soft. Tenderness: There is no abdominal tenderness.        Musculoskeletal:      Cervical back: Normal range of motion and neck supple. Lumbar back: Decreased range of motion. Right knee: Tenderness present. Left knee: Tenderness (crepitus) present. Feet:    Skin:     General: Skin is warm and dry. Findings: No rash. Psychiatric:         Mood and Affect: Mood is anxious. Judgment: Judgment is impulsive. Assessment:       Diagnosis Orders   1. RLQ abdominal pain  US ABDOMEN LIMITED      2. Lumbar disc disease with radiculopathy, L5-S1 with sciatica  HYDROcodone-acetaminophen (NORCO)  MG per tablet      3. Hx of lumbar discectomy, L5-S1, with one level fusion, 11/18/16. HYDROcodone-acetaminophen (NORCO)  MG per tablet      4. Primary osteoarthritis involving multiple joints  HYDROcodone-acetaminophen (NORCO)  MG per tablet      5. Anxiety  DISCONTINUED: ALPRAZolam (XANAX) 1 MG tablet      6. Dermatitis  nystatin-triamcinolone (MYCOLOG II) 826961-7.1 UNIT/GM-% cream    Dermatological Products, Misc. (EPICERAM) EMUL      7. Need for shingles vaccine  Zoster, SHINGRIX, (18 yrs +), IM      8. Hypercholesteremia        9. Elevated uric acid in blood        10. Primary hypertension        11. Mixed hyperlipidemia        12. PVD (peripheral vascular disease) ([facility]                Plan:      UA Abdomen to rule out spigelians hernia  Chronic conditions stable   - continue to monitor BP   - previous was well controlled  Labs reviewed  Refills as above  Again discussion on lack of benefit of chronic opioid pain control     - high risk of tolerance to medication   - additional options discussed but denied  Patient is minimizing Xanax: 1-2 doses per day  Follow up with Specialists  Immunizations discussed   Shingles #1  RTO in 6 months          Current Outpatient Medications   Medication Sig Dispense Refill    nystatin (MYCOSTATIN) 282340 UNIT/GM cream Apply topically 2 times daily Apply topically 2 times daily.       nystatin-triamcinolone (Simmie Forts II) 894263-7.4 UNIT/GM-% cream Apply topically 2 times daily. 60 g 2    Dermatological Products, Misc. (EPICERAM) EMUL Use BID PRN for itching 225 g 5    [START ON 12/21/2022] HYDROcodone-acetaminophen (NORCO)  MG per tablet Take 1 tablet by mouth every 8 hours as needed for Pain for up to 30 days. Fill on or after 12/21/22 90 tablet 0    atorvastatin (LIPITOR) 40 MG tablet TAKE 1 TABLET NIGHTLY 90 tablet 3    allopurinol (ZYLOPRIM) 300 MG tablet Take 1 tablet by mouth in the morning and 1 tablet before bedtime. 180 tablet 3    lisinopril (PRINIVIL;ZESTRIL) 40 MG tablet TAKE 1 TABLET DAILY 90 tablet 3    indomethacin (INDOCIN) 50 MG capsule Take 1 capsule by mouth 3 times daily as needed for Pain Take with food. 90 capsule 1    tadalafil (CIALIS) 20 MG tablet Take 1 tablet by mouth as needed for Erectile Dysfunction 30 tablet 3    hydrOXYzine (ATARAX) 25 MG tablet Take 1 tablet by mouth 3 times daily as needed for Itching 270 tablet 1    Misc Natural Products (TART CHERRY ADVANCED PO) Take 3,000 mg by mouth      omeprazole (PRILOSEC) 20 MG delayed release capsule       Dermatological Products, Misc. (EPICERAM) EMUL APPLY TO THE AFFECTED AREA(S) DAILY      CALCIUM CARBONATE-VITAMIN D PO Take 2 tablets by mouth daily Calcium carbonate 600 mg tablet (total from taking 2 tablets is 1200 mg), unsure of vitamin D dose      B Complex Vitamins (VITAMIN B COMPLEX PO) Take 1 tablet by mouth daily One daily       Multiple Vitamins-Minerals (MULTIVITAMIN & MINERAL PO) Take 1 tablet by mouth daily One daily--one bottle refill for one year       naproxen (NAPROSYN) 500 MG tablet Take 1 tablet by mouth 2 times daily (with meals) for 7 days 14 tablet 0     No current facility-administered medications for this visit.

## 2022-11-29 NOTE — TELEPHONE ENCOUNTER
PA request received from pharmacy for EpiCeram Emulsion #225 grams for 30 days. PA submitted online at [email or web address] and pending review.

## 2022-12-05 NOTE — TELEPHONE ENCOUNTER
Fax received from [employer] the PA for EpiCeram Emulsion was denied due to it not being a covered medication/Plan Exclusion. Please advise.

## 2022-12-05 NOTE — TELEPHONE ENCOUNTER
Notify patient. Patient aware this would likely be denied. Need to check with original prescriber on getting this.

## 2022-12-06 ENCOUNTER — HOSPITAL ENCOUNTER (OUTPATIENT)
Dept: ULTRASOUND IMAGING | Age: 57
Discharge: HOME | End: 2022-12-06
Payer: COMMERCIAL

## 2022-12-06 DIAGNOSIS — R10.31 RLQ ABDOMINAL PAIN: ICD-10-CM

## 2022-12-06 PROCEDURE — 76705 ECHO EXAM OF ABDOMEN: CPT

## 2022-12-13 ENCOUNTER — PATIENT MESSAGE (OUTPATIENT)
Dept: FAMILY MEDICINE CLINIC | Age: 57
End: 2022-12-13

## 2022-12-13 DIAGNOSIS — R05.9 COUGH: Primary | ICD-10-CM

## 2022-12-15 RX ORDER — DEXTROMETHORPHAN HYDROBROMIDE AND PROMETHAZINE HYDROCHLORIDE 15; 6.25 MG/5ML; MG/5ML
5 SYRUP ORAL 4 TIMES DAILY PRN
Qty: 120 ML | Refills: 0 | Status: SHIPPED | OUTPATIENT
Start: 2022-12-15 | End: 2022-12-22

## 2022-12-21 ENCOUNTER — OFFICE VISIT (OUTPATIENT)
Dept: FAMILY MEDICINE CLINIC | Age: 57
End: 2022-12-21
Payer: COMMERCIAL

## 2022-12-21 VITALS
SYSTOLIC BLOOD PRESSURE: 124 MMHG | WEIGHT: 230.8 LBS | HEART RATE: 116 BPM | RESPIRATION RATE: 20 BRPM | BODY MASS INDEX: 28.09 KG/M2 | DIASTOLIC BLOOD PRESSURE: 80 MMHG

## 2022-12-21 DIAGNOSIS — H10.33 ACUTE BACTERIAL CONJUNCTIVITIS OF BOTH EYES: Primary | ICD-10-CM

## 2022-12-21 DIAGNOSIS — J06.9 VIRAL URI: ICD-10-CM

## 2022-12-21 PROCEDURE — 3078F DIAST BP <80 MM HG: CPT | Performed by: NURSE PRACTITIONER

## 2022-12-21 PROCEDURE — 99213 OFFICE O/P EST LOW 20 MIN: CPT | Performed by: NURSE PRACTITIONER

## 2022-12-21 PROCEDURE — 3074F SYST BP LT 130 MM HG: CPT | Performed by: NURSE PRACTITIONER

## 2022-12-21 RX ORDER — POLYMYXIN B SULFATE AND TRIMETHOPRIM 1; 10000 MG/ML; [USP'U]/ML
1 SOLUTION OPHTHALMIC EVERY 6 HOURS
Qty: 10 ML | Refills: 0 | Status: SHIPPED | OUTPATIENT
Start: 2022-12-21 | End: 2022-12-26

## 2022-12-21 ASSESSMENT — ENCOUNTER SYMPTOMS
ABDOMINAL PAIN: 0
EYE REDNESS: 1
RHINORRHEA: 1
COUGH: 0
SHORTNESS OF BREATH: 0
EYE DISCHARGE: 1
NAUSEA: 0

## 2022-12-21 NOTE — PROGRESS NOTES
Nahun Fuentes. (1965) 60 y.o. male here for evaluation of the following chief complaint(s):      HPI:  Chief Complaint   Patient presents with    Eye Pain     Left eye is red and matted. \"Now I think it's starting to go in my right eye, it's all watery now\"       Onset of Monday with red eye and matting. Woke up. Also has URI symptoms. Starting to water in left eye now. No matting. Denies vision changes. No eye pain. No light sensitivity    Vitals:    12/21/22 1328   BP: 124/80   Pulse: (!) 116   Resp: 20       Patient Active Problem List   Diagnosis    HTN (hypertension)    Hypercholesteremia    Insomnia    Spine pain,  L-S- chronic pain    Varicose veins of both lower extremities    GERD (gastroesophageal reflux disease)    ED (erectile dysfunction)    Allergic rhinitis    Itchy skin    Medication monitoring encounter    Chronic cervical pain with radicular shoulder pain. Chronic abdominal pain, reflux/heartburn. Bilateral knee pain, rt>lt. Lumbar disc disease with radiculopathy, L5-S1 with sciatica    Chronic anxiety    Hx of lumbar discectomy, L5-S1, with one level fusion, 11/18/16. Primary osteoarthritis of right knee, bone on bone    Pain of right hip joint    Ventral hernia without obstruction or gangrene    Rectal bleeding    Acute GI bleeding    Hypovolemic shock (HCC)    Hemorrhagic shock (HCC)    Opioid dependence with opioid-induced sexual dysfunction (HCC)    Acquired buried penis    History of operative procedure on lumbosacral spinal structure    Intermittent claudication (HCC)    PVD (peripheral vascular disease) ([facility]       SUBJECTIVE/OBJECTIVE:  Review of Systems   Constitutional: Negative. Negative for chills and fever. HENT:  Positive for congestion, postnasal drip and rhinorrhea. Eyes:  Positive for discharge and redness. Respiratory:  Negative for cough and shortness of breath. Cardiovascular:  Negative for chest pain.    Gastrointestinal:  Negative for abdominal pain and nausea. Skin:  Negative for rash. Neurological:  Negative for dizziness, light-headedness and headaches. Psychiatric/Behavioral: Negative. Physical Exam  Constitutional:       General: He is not in acute distress. Appearance: He is not ill-appearing. Eyes:      General:         Left eye: Discharge present. Conjunctiva/sclera:      Right eye: Right conjunctiva is injected. Left eye: Left conjunctiva is injected. Pupils: Pupils are equal, round, and reactive to light. Cardiovascular:      Rate and Rhythm: Normal rate and regular rhythm. Heart sounds: No murmur heard. Pulmonary:      Effort: Pulmonary effort is normal.      Breath sounds: Normal breath sounds. No wheezing. Abdominal:      General: Bowel sounds are normal. There is no distension. Palpations: Abdomen is soft. Tenderness: There is no abdominal tenderness. Musculoskeletal:         General: No tenderness. Normal range of motion. Cervical back: Normal range of motion and neck supple. Skin:     General: Skin is warm and dry. Findings: No rash. Neurological:      Mental Status: He is alert. ASSESSMENT/PLAN:   Diagnosis Orders   1. Acute bacterial conjunctivitis of both eyes  trimethoprim-polymyxin b (POLYTRIM) 96835-3.1 UNIT/ML-% ophthalmic solution      2. Viral URI              MDM:  Orders as above  Warm compresses  OTC for congestion  RTO if symptoms worsen or stay the same      An electronic signature was used to authenticate this note.     --IVANNA Haque - CNP

## 2023-01-19 DIAGNOSIS — Z98.890 HX OF LUMBAR DISCECTOMY: ICD-10-CM

## 2023-01-19 DIAGNOSIS — M51.16 LUMBAR DISC DISEASE WITH RADICULOPATHY: ICD-10-CM

## 2023-01-19 DIAGNOSIS — M15.9 PRIMARY OSTEOARTHRITIS INVOLVING MULTIPLE JOINTS: ICD-10-CM

## 2023-01-19 RX ORDER — HYDROCODONE BITARTRATE AND ACETAMINOPHEN 10; 325 MG/1; MG/1
1 TABLET ORAL EVERY 8 HOURS PRN
Qty: 90 TABLET | Refills: 0 | Status: SHIPPED | OUTPATIENT
Start: 2023-01-20 | End: 2023-02-19

## 2023-01-19 NOTE — TELEPHONE ENCOUNTER
Lisette Borrego called requesting a refill of the below medication which has been pended for you:     Requested Prescriptions     Pending Prescriptions Disp Refills    HYDROcodone-acetaminophen (NORCO)  MG per tablet 90 tablet 0     Sig: Take 1 tablet by mouth every 8 hours as needed for Pain for up to 30 days. Fill on or after 12/21/22       Last Appointment Date: 12/21/2022  Next Appointment Date: 5/30/2023    No Known Allergies    Patient is asking for 3 scripts of Norco be sent to [facility] so he doesn't have to call the office every month. If no call back patient will check with the pharmacy at 5 pm today.

## 2023-02-17 NOTE — TELEPHONE ENCOUNTER
Attempted to notify pt, left vm. You have chosen to receive care through a telehealth visit.  Do you consent to use a video/audio connection for your medical care today? Yes

## 2023-02-21 DIAGNOSIS — Z98.890 HX OF LUMBAR DISCECTOMY: ICD-10-CM

## 2023-02-21 DIAGNOSIS — M15.9 PRIMARY OSTEOARTHRITIS INVOLVING MULTIPLE JOINTS: ICD-10-CM

## 2023-02-21 DIAGNOSIS — M51.16 LUMBAR DISC DISEASE WITH RADICULOPATHY: ICD-10-CM

## 2023-02-21 RX ORDER — HYDROCODONE BITARTRATE AND ACETAMINOPHEN 10; 325 MG/1; MG/1
1 TABLET ORAL EVERY 8 HOURS PRN
Qty: 90 TABLET | Refills: 0 | Status: SHIPPED | OUTPATIENT
Start: 2023-02-21 | End: 2023-03-23

## 2023-02-21 NOTE — TELEPHONE ENCOUNTER
Lisa Mueller called requesting a refill of the below medication which has been pended for you:     Requested Prescriptions     Pending Prescriptions Disp Refills    HYDROcodone-acetaminophen (NORCO)  MG per tablet 90 tablet 0     Sig: Take 1 tablet by mouth every 8 hours as needed for Pain for up to 30 days. Fill on or after 1/20/23       Last Appointment Date: 12/21/2022  Next Appointment Date: 5/30/2023    No Known Allergies      Pt is requesting 3 scripts go to [address]. Pt states the provider has been doing this ? Last time only 1 script was sent? If no call back pt will check with the pharmacy at 3 pm today.

## 2023-03-13 RX ORDER — LISINOPRIL 40 MG/1
TABLET ORAL
Qty: 90 TABLET | Refills: 3 | Status: SHIPPED | OUTPATIENT
Start: 2023-03-13

## 2023-03-15 DIAGNOSIS — J30.2 ACUTE SEASONAL ALLERGIC RHINITIS: ICD-10-CM

## 2023-03-15 RX ORDER — FEBUXOSTAT 40 MG/1
40 TABLET, FILM COATED ORAL DAILY
Qty: 90 TABLET | Refills: 1 | Status: SHIPPED | OUTPATIENT
Start: 2023-03-15

## 2023-03-15 RX ORDER — HYDROXYZINE HYDROCHLORIDE 25 MG/1
25 TABLET, FILM COATED ORAL 3 TIMES DAILY PRN
Qty: 270 TABLET | Refills: 1 | Status: SHIPPED | OUTPATIENT
Start: 2023-03-15

## 2023-03-15 NOTE — TELEPHONE ENCOUNTER
Patient requesting refill of Hydroxyzine 25mg to [facility]. Please refill if appropriate. Patient also c/o itching for \"awhile\" and believes it is related to his allopurinol. He has not taken it for the past 3 days and itching has decreased. Questioning alternative medication or if he can stop it and see what happens.   Please advise

## 2023-03-20 DIAGNOSIS — M15.9 PRIMARY OSTEOARTHRITIS INVOLVING MULTIPLE JOINTS: ICD-10-CM

## 2023-03-20 DIAGNOSIS — M51.16 LUMBAR DISC DISEASE WITH RADICULOPATHY: ICD-10-CM

## 2023-03-20 DIAGNOSIS — Z98.890 HX OF LUMBAR DISCECTOMY: ICD-10-CM

## 2023-03-20 RX ORDER — HYDROCODONE BITARTRATE AND ACETAMINOPHEN 10; 325 MG/1; MG/1
1 TABLET ORAL EVERY 8 HOURS PRN
Qty: 90 TABLET | Refills: 0 | Status: SHIPPED | OUTPATIENT
Start: 2023-05-22 | End: 2023-06-21

## 2023-03-20 RX ORDER — HYDROCODONE BITARTRATE AND ACETAMINOPHEN 10; 325 MG/1; MG/1
1 TABLET ORAL EVERY 8 HOURS PRN
Qty: 90 TABLET | Refills: 0 | Status: SHIPPED | OUTPATIENT
Start: 2023-03-23 | End: 2023-04-22

## 2023-03-20 RX ORDER — HYDROCODONE BITARTRATE AND ACETAMINOPHEN 10; 325 MG/1; MG/1
1 TABLET ORAL EVERY 8 HOURS PRN
Qty: 90 TABLET | Refills: 0 | Status: SHIPPED | OUTPATIENT
Start: 2023-04-22 | End: 2023-05-22

## 2023-03-20 NOTE — TELEPHONE ENCOUNTER
----- Message from [address] sent at 3/20/2023 12:41 PM EDT -----  Subject: Refill Request    QUESTIONS  Name of Medication? HYDROcodone-acetaminophen (NORCO)  MG per tablet  Patient-reported dosage and instructions? 3x a day  How many days do you have left? 1  Preferred Pharmacy? [address]  Pharmacy phone number (if available)? [phone]  Additional Information for Provider? Needs a 3 month supply  ---------------------------------------------------------------------------  --------------  CALL BACK INFO  What is the best way for the office to contact you? OK to leave message on   voicemail  Preferred Call Back Phone Number? [phone]  ---------------------------------------------------------------------------  --------------  SCRIPT ANSWERS  Relationship to Patient?  Self

## 2023-04-05 RX ORDER — TADALAFIL 20 MG/1
20 TABLET ORAL PRN
Qty: 30 TABLET | Refills: 3 | Status: SHIPPED | OUTPATIENT
Start: 2023-04-05 | End: 2024-04-04

## 2023-04-05 NOTE — TELEPHONE ENCOUNTER
Pt called office requesting a refill of Cialis to [facility]. If no call back he will check with them after 1pm today. Refill if appropriate.

## 2023-05-01 ENCOUNTER — TELEPHONE (OUTPATIENT)
Dept: FAMILY MEDICINE CLINIC | Age: 58
End: 2023-05-01

## 2023-05-01 NOTE — TELEPHONE ENCOUNTER
----- Message from Herve Cole sent at 5/1/2023  2:17 PM EDT -----  Subject: Message to Provider    QUESTIONS  Information for Provider? Pt needs to schedule for his 2nd shingles shot. Please contact pt to schedule.   ---------------------------------------------------------------------------  --------------  [facility] INFO  [phone]; OK to leave message on voicemail  ---------------------------------------------------------------------------  --------------  SCRIPT ANSWERS  Relationship to Patient? Self  (Is the patient requesting to see the provider for a procedure?)?  Yes

## 2023-05-01 NOTE — TELEPHONE ENCOUNTER
Discussion with wife in appointemnt today regarding him possibly being overdue for his 2nd shingles. Review of his record shows if we keep his appointment at end of month he will be fine to get that shot then and still be compliant with shingles vaccine and no need to repeat dosing.

## 2023-05-01 NOTE — TELEPHONE ENCOUNTER
Pt called office back regarding. He does not want to wait until 5/30/23 for the Shingles shot.  Appt scheduled tomorrow per pt request.

## 2023-05-02 ENCOUNTER — NURSE ONLY (OUTPATIENT)
Dept: FAMILY MEDICINE CLINIC | Age: 58
End: 2023-05-02
Payer: COMMERCIAL

## 2023-05-02 DIAGNOSIS — Z23 NEED FOR SHINGLES VACCINE: Primary | ICD-10-CM

## 2023-05-02 PROCEDURE — 90471 IMMUNIZATION ADMIN: CPT | Performed by: NURSE PRACTITIONER

## 2023-05-02 PROCEDURE — 99999 PR OFFICE/OUTPT VISIT,PROCEDURE ONLY: CPT | Performed by: NURSE PRACTITIONER

## 2023-05-02 PROCEDURE — 90750 HZV VACC RECOMBINANT IM: CPT | Performed by: NURSE PRACTITIONER

## 2023-05-02 NOTE — PROGRESS NOTES
Immunizations Administered       Name Date Dose Route    Zoster Recombinant (Shingrix) 5/2/2023 0.5 mL Intramuscular    Site: Deltoid- Left    Lot: [id number]    NDC: 38660-810-26          Patient was given Shingrix #2 IM   in left deltoid per v.o. Anaid Arenas CNP. NDC# 57374-454-04. Prior to administration vaccine was mixed with adjuvant suspension component LOT# [id number] Exp: 2/8/2024 NDC# 59345-935-04 Patient tolerated well and without incident. VIS given and reviewed. ABN signed.
PAIN/ABDOMINAL PAIN

## 2023-05-30 ENCOUNTER — OFFICE VISIT (OUTPATIENT)
Dept: FAMILY MEDICINE CLINIC | Age: 58
End: 2023-05-30
Payer: COMMERCIAL

## 2023-05-30 VITALS
DIASTOLIC BLOOD PRESSURE: 70 MMHG | WEIGHT: 222.3 LBS | RESPIRATION RATE: 16 BRPM | HEART RATE: 80 BPM | SYSTOLIC BLOOD PRESSURE: 124 MMHG | HEIGHT: 76 IN | BODY MASS INDEX: 27.07 KG/M2

## 2023-05-30 DIAGNOSIS — I73.9 PVD (PERIPHERAL VASCULAR DISEASE) (HCC): ICD-10-CM

## 2023-05-30 DIAGNOSIS — Z00.00 WELL ADULT EXAM: Primary | ICD-10-CM

## 2023-05-30 DIAGNOSIS — E79.0 ELEVATED URIC ACID IN BLOOD: ICD-10-CM

## 2023-05-30 DIAGNOSIS — M15.9 PRIMARY OSTEOARTHRITIS INVOLVING MULTIPLE JOINTS: ICD-10-CM

## 2023-05-30 DIAGNOSIS — Z98.890 HX OF LUMBAR DISCECTOMY: ICD-10-CM

## 2023-05-30 DIAGNOSIS — F11.281: ICD-10-CM

## 2023-05-30 DIAGNOSIS — E78.2 MIXED HYPERLIPIDEMIA: ICD-10-CM

## 2023-05-30 DIAGNOSIS — F41.9 ANXIETY: ICD-10-CM

## 2023-05-30 DIAGNOSIS — M51.16 LUMBAR DISC DISEASE WITH RADICULOPATHY: ICD-10-CM

## 2023-05-30 PROCEDURE — 3074F SYST BP LT 130 MM HG: CPT | Performed by: NURSE PRACTITIONER

## 2023-05-30 PROCEDURE — 3078F DIAST BP <80 MM HG: CPT | Performed by: NURSE PRACTITIONER

## 2023-05-30 PROCEDURE — 99396 PREV VISIT EST AGE 40-64: CPT | Performed by: NURSE PRACTITIONER

## 2023-05-30 RX ORDER — HYDROCODONE BITARTRATE AND ACETAMINOPHEN 10; 325 MG/1; MG/1
1 TABLET ORAL EVERY 8 HOURS PRN
Qty: 90 TABLET | Refills: 0 | Status: SHIPPED | OUTPATIENT
Start: 2023-06-21 | End: 2023-07-21

## 2023-05-30 RX ORDER — HYDROCODONE BITARTRATE AND ACETAMINOPHEN 10; 325 MG/1; MG/1
1 TABLET ORAL EVERY 8 HOURS PRN
Qty: 90 TABLET | Refills: 0 | Status: SHIPPED | OUTPATIENT
Start: 2023-07-21 | End: 2023-08-20

## 2023-05-30 SDOH — ECONOMIC STABILITY: HOUSING INSECURITY
IN THE LAST 12 MONTHS, WAS THERE A TIME WHEN YOU DID NOT HAVE A STEADY PLACE TO SLEEP OR SLEPT IN A SHELTER (INCLUDING NOW)?: NO

## 2023-05-30 SDOH — ECONOMIC STABILITY: FOOD INSECURITY: WITHIN THE PAST 12 MONTHS, THE FOOD YOU BOUGHT JUST DIDN'T LAST AND YOU DIDN'T HAVE MONEY TO GET MORE.: NEVER TRUE

## 2023-05-30 SDOH — ECONOMIC STABILITY: FOOD INSECURITY: WITHIN THE PAST 12 MONTHS, YOU WORRIED THAT YOUR FOOD WOULD RUN OUT BEFORE YOU GOT MONEY TO BUY MORE.: NEVER TRUE

## 2023-05-30 SDOH — ECONOMIC STABILITY: INCOME INSECURITY: HOW HARD IS IT FOR YOU TO PAY FOR THE VERY BASICS LIKE FOOD, HOUSING, MEDICAL CARE, AND HEATING?: NOT HARD AT ALL

## 2023-05-30 ASSESSMENT — PATIENT HEALTH QUESTIONNAIRE - PHQ9
2. FEELING DOWN, DEPRESSED OR HOPELESS: 0
SUM OF ALL RESPONSES TO PHQ QUESTIONS 1-9: 0
SUM OF ALL RESPONSES TO PHQ QUESTIONS 1-9: 0
SUM OF ALL RESPONSES TO PHQ9 QUESTIONS 1 & 2: 0
SUM OF ALL RESPONSES TO PHQ QUESTIONS 1-9: 0
SUM OF ALL RESPONSES TO PHQ QUESTIONS 1-9: 0
1. LITTLE INTEREST OR PLEASURE IN DOING THINGS: 0

## 2023-05-30 ASSESSMENT — ENCOUNTER SYMPTOMS
NAUSEA: 0
SHORTNESS OF BREATH: 0
BACK PAIN: 1
ABDOMINAL PAIN: 1
COUGH: 0

## 2023-05-30 NOTE — PROGRESS NOTES
Subjective:      Patient ID: Ronald Walker. 1965 is a 59 y.o. male here for evaluation. Chief Complaint   Patient presents with    Pain     6 month follow up     Gout     Discuss gout medication        Patient Active Problem List   Diagnosis    HTN (hypertension)    Hypercholesteremia    Insomnia    Spine pain,  L-S- chronic pain    Varicose veins of both lower extremities    GERD (gastroesophageal reflux disease)    ED (erectile dysfunction)    Allergic rhinitis    Itchy skin    Medication monitoring encounter    Chronic cervical pain with radicular shoulder pain. Chronic abdominal pain, reflux/heartburn. Bilateral knee pain, rt>lt. Lumbar disc disease with radiculopathy, L5-S1 with sciatica    Chronic anxiety    Hx of lumbar discectomy, L5-S1, with one level fusion, 11/18/16. Primary osteoarthritis of right knee, bone on bone    Pain of right hip joint    Ventral hernia without obstruction or gangrene    Rectal bleeding    Acute GI bleeding    Hypovolemic shock (HCC)    Hemorrhagic shock (HCC)    Opioid dependence with opioid-induced sexual dysfunction ([address]    Acquired buried penis    History of operative procedure on lumbosacral spinal structure    Intermittent claudication ([address]    PVD (peripheral vascular disease) ([address]       COLONOSCOPY - 2018    GASTRO - Dr. Bishop Jerez    Back surgery in 2020 and 2016 with hardware. He is on Norco  QID. Chronic back pain. Chronic joint pains. Hx of right partial knee replacement. Keeps him active and moving. Left knee starting to click. Mild pain. Anxiety stable on use of Xanax 1 mg up to 3 times daily. Usually take no more than BID and tries to get away with 1 per day. #270 1R lasted him 9 months. Denies CP, SOB or chest tightness. Denies cramping in lower extremites with activity. Switch off Allopurinol 300 mg BID due to itching. Uloric he is having bone pain when he takes. Gout stable. No recent flares.

## 2023-07-25 ENCOUNTER — HOSPITAL ENCOUNTER (OUTPATIENT)
Dept: INTERVENTIONAL RADIOLOGY/VASCULAR | Age: 58
Discharge: HOME | End: 2023-07-25

## 2023-07-25 DIAGNOSIS — Z13.6 ENCOUNTER FOR SCREENING FOR VASCULAR DISEASE: ICD-10-CM

## 2023-07-25 PROCEDURE — 9900000021 US VASCULAR SCREENING

## 2023-07-26 DIAGNOSIS — I73.9 PAD (PERIPHERAL ARTERY DISEASE) (HCC): Primary | ICD-10-CM

## 2023-07-26 DIAGNOSIS — I73.9 CLAUDICATION (HCC): ICD-10-CM

## 2023-07-26 DIAGNOSIS — R68.89 ABNORMAL ANKLE BRACHIAL INDEX (ABI): ICD-10-CM

## 2023-08-02 ENCOUNTER — TELEPHONE (OUTPATIENT)
Dept: FAMILY MEDICINE CLINIC | Age: 58
End: 2023-08-02

## 2023-08-02 NOTE — TELEPHONE ENCOUNTER
Pt called office stating we made a referral for [facility] to [facility]. He received a vm 7/27/23 to call and set up his appt. He has called that office twice since then, left two messages with no return call. Pt is feeling a little put off and feeling like they just don't want to see him as a pt. I tried to call their office twice at ext [phone] to transfer pt call and got their vm myself. Advised pt I would look into this and call him back once I speak to that office. Pt asks that we call him after 1pm since he is at work right now. I called [facility] Scheduling ext [phone], spoke with Desmond Pennington who says they do not schedule for Cardiothoracic, that office does all their own scheduling. I phoned [facility] ext [phone] again and left a detailed vm requesting a return call. Also routing this message to Clemente Freitas MA who called pt on 7/27/23 and left pt a msg to call back to schedule.

## 2023-08-02 NOTE — TELEPHONE ENCOUNTER
Phoned [facility] ext [phone], spoke with Alexander Betancourt who says she will reach out to the pt today to get this appt scheduled. Pt was notified.

## 2023-08-07 ENCOUNTER — OFFICE VISIT (OUTPATIENT)
Dept: FAMILY MEDICINE CLINIC | Age: 58
End: 2023-08-07
Payer: COMMERCIAL

## 2023-08-07 VITALS
SYSTOLIC BLOOD PRESSURE: 152 MMHG | BODY MASS INDEX: 27.86 KG/M2 | RESPIRATION RATE: 20 BRPM | WEIGHT: 228.9 LBS | HEART RATE: 100 BPM | DIASTOLIC BLOOD PRESSURE: 82 MMHG

## 2023-08-07 DIAGNOSIS — I73.9 CLAUDICATION (HCC): ICD-10-CM

## 2023-08-07 DIAGNOSIS — M51.16 LUMBAR DISC DISEASE WITH RADICULOPATHY: ICD-10-CM

## 2023-08-07 DIAGNOSIS — I73.9 PVD (PERIPHERAL VASCULAR DISEASE) (HCC): Primary | ICD-10-CM

## 2023-08-07 DIAGNOSIS — R68.89 ABNORMAL ANKLE BRACHIAL INDEX (ABI): ICD-10-CM

## 2023-08-07 DIAGNOSIS — Z98.890 HX OF LUMBAR DISCECTOMY: ICD-10-CM

## 2023-08-07 PROCEDURE — 3074F SYST BP LT 130 MM HG: CPT | Performed by: NURSE PRACTITIONER

## 2023-08-07 PROCEDURE — 99214 OFFICE O/P EST MOD 30 MIN: CPT | Performed by: NURSE PRACTITIONER

## 2023-08-07 PROCEDURE — 3078F DIAST BP <80 MM HG: CPT | Performed by: NURSE PRACTITIONER

## 2023-08-07 RX ORDER — ALLOPURINOL 300 MG/1
300 TABLET ORAL 2 TIMES DAILY
COMMUNITY
Start: 2023-07-09

## 2023-08-07 ASSESSMENT — ENCOUNTER SYMPTOMS
NAUSEA: 0
COUGH: 0
BACK PAIN: 1
ABDOMINAL PAIN: 1
SHORTNESS OF BREATH: 0

## 2023-08-07 NOTE — PROGRESS NOTES
Subjective:      Patient ID: Nohemy Maloney. 1965 is a 59 y.o. male here for evaluation. Chief Complaint   Patient presents with    Lower Back Pain    Results     Discuss vascular screen     Hx of PAD.  2019. Angiogram in 2019 showed complete occlusion in right arteries with extensive collateral.  Seen Vascular in [facility] and placed on Pletal and walking program.  Was eventually able to come off pletal.     Cramping at night. 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 Rest will improved. US Vascular Screen 2023:  IMPRESSION:     1. Moderately decreased bilateral ankle-brachial indices. 2. Abnormal monophasic Doppler waveforms are present in the lower extremities. 3. Mild right-sided and minimal left-sided atherosclerotic plaque resulting in less than 50% stenosis of the internal carotid arteries. Right MICHELLE:  0.55. (Normal range 0.9-1.3)  Left MICHELLE:  0.60. (Normal range 0.9-1.3)     Patient Active Problem List   Diagnosis    HTN (hypertension)    Hypercholesteremia    Insomnia    Spine pain,  L-S- chronic pain    Varicose veins of both lower extremities    GERD (gastroesophageal reflux disease)    ED (erectile dysfunction)    Allergic rhinitis    Itchy skin    Medication monitoring encounter    Chronic cervical pain with radicular shoulder pain. Chronic abdominal pain, reflux/heartburn. Bilateral knee pain, rt>lt. Lumbar disc disease with radiculopathy, L5-S1 with sciatica    Chronic anxiety    Hx of lumbar discectomy, L5-S1, with one level fusion, 11/18/16.     Primary osteoarthritis of right knee, bone on bone    Pain of right hip joint    Ventral hernia without obstruction or gangrene    Rectal bleeding    Acute GI bleeding    Hypovolemic shock (HCC)    Hemorrhagic shock (HCC)    Opioid dependence with opioid-induced sexual dysfunction (HCC)    Acquired buried penis    History of operative procedure on lumbosacral spinal structure    Intermittent claudication

## 2023-08-21 ENCOUNTER — PATIENT MESSAGE (OUTPATIENT)
Dept: FAMILY MEDICINE CLINIC | Age: 58
End: 2023-08-21

## 2023-08-21 DIAGNOSIS — Z98.890 HX OF LUMBAR DISCECTOMY: ICD-10-CM

## 2023-08-21 DIAGNOSIS — M15.9 PRIMARY OSTEOARTHRITIS INVOLVING MULTIPLE JOINTS: ICD-10-CM

## 2023-08-21 DIAGNOSIS — M51.16 LUMBAR DISC DISEASE WITH RADICULOPATHY: ICD-10-CM

## 2023-08-21 RX ORDER — HYDROCODONE BITARTRATE AND ACETAMINOPHEN 10; 325 MG/1; MG/1
1 TABLET ORAL EVERY 8 HOURS PRN
Qty: 90 TABLET | Refills: 0 | Status: SHIPPED | OUTPATIENT
Start: 2023-10-20 | End: 2023-11-19

## 2023-08-21 RX ORDER — HYDROCODONE BITARTRATE AND ACETAMINOPHEN 10; 325 MG/1; MG/1
1 TABLET ORAL EVERY 8 HOURS PRN
Qty: 90 TABLET | Refills: 0 | Status: SHIPPED | OUTPATIENT
Start: 2023-09-20 | End: 2023-10-20

## 2023-08-21 RX ORDER — HYDROCODONE BITARTRATE AND ACETAMINOPHEN 10; 325 MG/1; MG/1
1 TABLET ORAL EVERY 8 HOURS PRN
Qty: 90 TABLET | Refills: 0 | Status: SHIPPED | OUTPATIENT
Start: 2023-08-21 | End: 2023-09-20

## 2023-08-21 NOTE — TELEPHONE ENCOUNTER
From: Esme Holt. To: Antonio Davila  Sent: 8/21/2023 11:51 AM EDT  Subject: Medication    Please send a refill for [place] to [address] [place] [address].      Thank you   Jonah Carrrea

## 2023-09-12 ENCOUNTER — OFFICE VISIT (OUTPATIENT)
Dept: CARDIOTHORACIC SURGERY | Age: 58
End: 2023-09-12
Payer: COMMERCIAL

## 2023-09-12 VITALS
HEART RATE: 109 BPM | WEIGHT: 221 LBS | BODY MASS INDEX: 26.91 KG/M2 | SYSTOLIC BLOOD PRESSURE: 142 MMHG | HEIGHT: 76 IN | DIASTOLIC BLOOD PRESSURE: 81 MMHG

## 2023-09-12 DIAGNOSIS — I73.9 PAD (PERIPHERAL ARTERY DISEASE) (HCC): Primary | ICD-10-CM

## 2023-09-12 PROCEDURE — 3077F SYST BP >= 140 MM HG: CPT | Performed by: THORACIC SURGERY (CARDIOTHORACIC VASCULAR SURGERY)

## 2023-09-12 PROCEDURE — 99204 OFFICE O/P NEW MOD 45 MIN: CPT | Performed by: THORACIC SURGERY (CARDIOTHORACIC VASCULAR SURGERY)

## 2023-09-12 PROCEDURE — 3079F DIAST BP 80-89 MM HG: CPT | Performed by: THORACIC SURGERY (CARDIOTHORACIC VASCULAR SURGERY)

## 2023-09-12 RX ORDER — CILOSTAZOL 50 MG/1
50 TABLET ORAL 2 TIMES DAILY
Qty: 8 TABLET | Refills: 0 | Status: SHIPPED | OUTPATIENT
Start: 2023-09-12 | End: 2023-09-16

## 2023-09-12 RX ORDER — CILOSTAZOL 50 MG/1
50 TABLET ORAL DAILY
Qty: 3 TABLET | Refills: 0 | Status: SHIPPED | OUTPATIENT
Start: 2023-09-12 | End: 2023-09-15

## 2023-09-12 RX ORDER — CILOSTAZOL 100 MG/1
100 TABLET ORAL 2 TIMES DAILY
Qty: 60 TABLET | Refills: 0 | Status: SHIPPED | OUTPATIENT
Start: 2023-09-12 | End: 2023-10-12

## 2023-09-12 NOTE — PATIENT INSTRUCTIONS
If you receive a survey asking about your care experience, please respond. Your answers will help ensure you receive high-quality care at this office. Thank you! Your Medical Assistant today: Michelle Robert. Thank you for coming to our office! It was a pleasure to serve you.

## 2023-09-12 NOTE — PROGRESS NOTES
CT/CV Surgery New Patient Office Visit      Patient's Name/Date of Birth: Linda Jarvis. / 1965 (12 y.o.)      PCP: IVANNA Blake CNP    Date: September 12, 2023     CC:   Chief Complaint   Patient presents with    New Patient     PAD, referral from Rehana Romero CNP        HPI:   We had the pleasure of seeing Linda Helm in the office today, as you know this is a very pleasant 59y.o. year old male with a history of hypertension, borderline DM 2(A1C 6.5), hyperlipidemia, gout, arthritis, status post back surgery for L3-4 lumbar disc, right knee replacement, laser ablation of bilateral varicose vein and peripheral artery disease. He came to the cardiovascular surgery clinic for surgical evaluation. He reports1 and half block claudication affecting both lower extremity. He also reports occasional leg cramps at night during the sleep. He denies any history of nonhealing wound or ulcer in the lower extremity. He had femoral angiogram in 2019, which revealed totally occluded distal right SFA with extensive collateral circulations. He had an arterial Doppler study recently, which showed decreased MICHELLE of Rt (0.55) and Lt(0.6). He quitted smoking 17 years ago. PastMedical History:  Nicolette Royal  has a past medical history of Anxiety, Arthritis, Hyperlipemia, Hypertension, and Ruptured varicose vein. Past Surgical History:  The patient  has a past surgical history that includes back surgery (11/18/2016); Knee arthroscopy (Bilateral); back surgery (2002); Colonoscopy (10/13/2017); Hemorrhoid surgery (09/28/2018); Colonoscopy (2014); Colonoscopy (2013); Upper gastrointestinal endoscopy (2013); Colonoscopy (Left, 10/21/2018); Upper gastrointestinal endoscopy (10/21/2018); pr office/outpt visit,procedure only (N/A, 10/24/2018); and joint replacement (Right, 07/18/2019). Allergies: The patient has No Known Allergies.     Medications:    Current Outpatient Medications:

## 2023-09-12 NOTE — PROGRESS NOTES
US vascular screening 7/25/23  Claudication after walking long distances  Denies swelling  Slight discoloration of the right side

## 2023-09-17 DIAGNOSIS — E78.00 HYPERCHOLESTEREMIA: ICD-10-CM

## 2023-09-18 RX ORDER — ATORVASTATIN CALCIUM 40 MG/1
TABLET, FILM COATED ORAL
Qty: 90 TABLET | Refills: 3 | Status: SHIPPED | OUTPATIENT
Start: 2023-09-18

## 2023-10-02 ENCOUNTER — TELEPHONE (OUTPATIENT)
Dept: CARDIOTHORACIC SURGERY | Age: 58
End: 2023-10-02

## 2023-10-02 NOTE — TELEPHONE ENCOUNTER
Pt called in, needs prescription of cilostazol 100 mg sent to [facility]. 90 day supply if possible. Pt following up with Dr. Sherine Blanchard in December. Please advise.

## 2023-10-05 RX ORDER — CILOSTAZOL 100 MG/1
100 TABLET ORAL 2 TIMES DAILY
Qty: 60 TABLET | Refills: 0 | Status: SHIPPED | OUTPATIENT
Start: 2023-10-05 | End: 2023-11-04

## 2023-11-14 DIAGNOSIS — J30.2 ACUTE SEASONAL ALLERGIC RHINITIS: ICD-10-CM

## 2023-11-14 RX ORDER — CILOSTAZOL 100 MG/1
100 TABLET ORAL 2 TIMES DAILY
Qty: 60 TABLET | Refills: 0 | Status: SHIPPED | OUTPATIENT
Start: 2023-11-14

## 2023-11-14 RX ORDER — ALLOPURINOL 300 MG/1
300 TABLET ORAL 2 TIMES DAILY
Qty: 90 TABLET | Refills: 3 | Status: SHIPPED | OUTPATIENT
Start: 2023-11-14

## 2023-11-14 RX ORDER — ALLOPURINOL 300 MG/1
300 TABLET ORAL 2 TIMES DAILY
Qty: 180 TABLET | Refills: 0 | OUTPATIENT
Start: 2023-11-14

## 2023-11-14 NOTE — TELEPHONE ENCOUNTER
The patient called in and is asking if his hydroxyzine 25mg TID can be increased or changed to something different. He states that the itching gets so bad that it feels like \"100 mosquito bites\". He states that he is using lotion and moisturizing soaps. The patient is also requesting a refill on his allopurinol 300mg to be sent to [facility]. Order pended for your signature.

## 2023-11-20 ENCOUNTER — PATIENT MESSAGE (OUTPATIENT)
Dept: FAMILY MEDICINE CLINIC | Age: 58
End: 2023-11-20

## 2023-11-20 DIAGNOSIS — Z98.890 HX OF LUMBAR DISCECTOMY: ICD-10-CM

## 2023-11-20 DIAGNOSIS — M51.16 LUMBAR DISC DISEASE WITH RADICULOPATHY: ICD-10-CM

## 2023-11-20 DIAGNOSIS — M15.9 PRIMARY OSTEOARTHRITIS INVOLVING MULTIPLE JOINTS: ICD-10-CM

## 2023-11-20 RX ORDER — HYDROCODONE BITARTRATE AND ACETAMINOPHEN 10; 325 MG/1; MG/1
1 TABLET ORAL EVERY 8 HOURS PRN
Qty: 90 TABLET | Refills: 0 | Status: SHIPPED | OUTPATIENT
Start: 2023-11-20 | End: 2023-12-20

## 2023-11-20 NOTE — TELEPHONE ENCOUNTER
From: Carlton Sanchez. To: Brian Yanes  Sent: 11/20/2023 9:31 AM EST  Subject: Refill      Please send a refill for [place] to [address] [place] [address].      Thank you   Yoanna Vega

## 2023-11-30 ENCOUNTER — OFFICE VISIT (OUTPATIENT)
Dept: FAMILY MEDICINE CLINIC | Age: 58
End: 2023-11-30
Payer: COMMERCIAL

## 2023-11-30 VITALS
HEART RATE: 84 BPM | BODY MASS INDEX: 26.96 KG/M2 | WEIGHT: 221.5 LBS | RESPIRATION RATE: 16 BRPM | SYSTOLIC BLOOD PRESSURE: 122 MMHG | DIASTOLIC BLOOD PRESSURE: 70 MMHG

## 2023-11-30 DIAGNOSIS — I73.9 CLAUDICATION (HCC): ICD-10-CM

## 2023-11-30 DIAGNOSIS — J30.2 ACUTE SEASONAL ALLERGIC RHINITIS: ICD-10-CM

## 2023-11-30 DIAGNOSIS — M51.16 LUMBAR DISC DISEASE WITH RADICULOPATHY: ICD-10-CM

## 2023-11-30 DIAGNOSIS — R68.89 ABNORMAL ANKLE BRACHIAL INDEX (ABI): ICD-10-CM

## 2023-11-30 DIAGNOSIS — M15.9 PRIMARY OSTEOARTHRITIS INVOLVING MULTIPLE JOINTS: ICD-10-CM

## 2023-11-30 DIAGNOSIS — L85.3 XEROSIS OF SKIN: Primary | ICD-10-CM

## 2023-11-30 DIAGNOSIS — E79.0 ELEVATED URIC ACID IN BLOOD: ICD-10-CM

## 2023-11-30 DIAGNOSIS — E78.00 HYPERCHOLESTEREMIA: ICD-10-CM

## 2023-11-30 DIAGNOSIS — I73.9 PVD (PERIPHERAL VASCULAR DISEASE) (HCC): ICD-10-CM

## 2023-11-30 DIAGNOSIS — R73.01 IFG (IMPAIRED FASTING GLUCOSE): ICD-10-CM

## 2023-11-30 DIAGNOSIS — F41.9 ANXIETY: ICD-10-CM

## 2023-11-30 DIAGNOSIS — Z98.890 HX OF LUMBAR DISCECTOMY: ICD-10-CM

## 2023-11-30 DIAGNOSIS — K64.4 EXTERNAL HEMORRHOID: ICD-10-CM

## 2023-11-30 PROCEDURE — 3074F SYST BP LT 130 MM HG: CPT | Performed by: NURSE PRACTITIONER

## 2023-11-30 PROCEDURE — 99214 OFFICE O/P EST MOD 30 MIN: CPT | Performed by: NURSE PRACTITIONER

## 2023-11-30 PROCEDURE — 3078F DIAST BP <80 MM HG: CPT | Performed by: NURSE PRACTITIONER

## 2023-11-30 RX ORDER — HYDROXYZINE 50 MG/1
50 TABLET, FILM COATED ORAL 3 TIMES DAILY PRN
Qty: 270 TABLET | Refills: 1 | Status: SHIPPED | OUTPATIENT
Start: 2023-11-30

## 2023-11-30 RX ORDER — HYDROCODONE BITARTRATE AND ACETAMINOPHEN 10; 325 MG/1; MG/1
1 TABLET ORAL EVERY 8 HOURS PRN
Qty: 90 TABLET | Refills: 0 | Status: SHIPPED | OUTPATIENT
Start: 2024-02-18 | End: 2024-03-19

## 2023-11-30 RX ORDER — LISINOPRIL 40 MG/1
TABLET ORAL
Qty: 90 TABLET | Refills: 3 | Status: SHIPPED | OUTPATIENT
Start: 2023-11-30

## 2023-11-30 RX ORDER — HYDROCODONE BITARTRATE AND ACETAMINOPHEN 10; 325 MG/1; MG/1
1 TABLET ORAL EVERY 8 HOURS PRN
Qty: 90 TABLET | Refills: 0 | Status: SHIPPED | OUTPATIENT
Start: 2023-12-20 | End: 2024-01-19

## 2023-11-30 RX ORDER — HYDROCODONE BITARTRATE AND ACETAMINOPHEN 10; 325 MG/1; MG/1
1 TABLET ORAL EVERY 8 HOURS PRN
Qty: 90 TABLET | Refills: 0 | Status: SHIPPED | OUTPATIENT
Start: 2024-01-19 | End: 2024-02-18

## 2023-11-30 ASSESSMENT — PATIENT HEALTH QUESTIONNAIRE - PHQ9
SUM OF ALL RESPONSES TO PHQ QUESTIONS 1-9: 0
1. LITTLE INTEREST OR PLEASURE IN DOING THINGS: 0
SUM OF ALL RESPONSES TO PHQ9 QUESTIONS 1 & 2: 0
2. FEELING DOWN, DEPRESSED OR HOPELESS: 0
SUM OF ALL RESPONSES TO PHQ QUESTIONS 1-9: 0

## 2023-11-30 ASSESSMENT — ENCOUNTER SYMPTOMS
SHORTNESS OF BREATH: 0
COUGH: 0
BACK PAIN: 1
NAUSEA: 0
ABDOMINAL PAIN: 1

## 2023-11-30 NOTE — PROGRESS NOTES
tablet 0    allopurinol (ZYLOPRIM) 300 MG tablet Take 1 tablet by mouth 2 times daily 90 tablet 3    atorvastatin (LIPITOR) 40 MG tablet TAKE 1 TABLET NIGHTLY 90 tablet 3    indomethacin (INDOCIN) 50 MG capsule Take 1 capsule by mouth 3 times daily as needed for Pain Take with food. 90 capsule 1    tadalafil (CIALIS) 20 MG tablet Take 1 tablet by mouth as needed for Erectile Dysfunction 30 tablet 3    lisinopril (PRINIVIL;ZESTRIL) 40 MG tablet TAKE 1 TABLET DAILY 90 tablet 3    nystatin (MYCOSTATIN) 980993 UNIT/GM cream Apply topically 2 times daily Apply topically 2 times daily. nystatin-triamcinolone (MYCOLOG II) 377088-6.1 UNIT/GM-% cream Apply topically 2 times daily. 60 g 2    Misc Natural Products (TART CHERRY ADVANCED PO) Take 3,000 mg by mouth      omeprazole (PRILOSEC) 20 MG delayed release capsule       Dermatological Products, Misc. (EPICERAM) EMUL       CALCIUM CARBONATE-VITAMIN D PO Take 2 tablets by mouth daily Calcium carbonate 600 mg tablet (total from taking 2 tablets is 1200 mg), unsure of vitamin D dose      B Complex Vitamins (VITAMIN B COMPLEX PO) Take 1 tablet by mouth daily One daily       Multiple Vitamins-Minerals (MULTIVITAMIN & MINERAL PO) Take 1 tablet by mouth daily One daily--one bottle refill for one year        No current facility-administered medications for this visit.

## 2023-12-06 ENCOUNTER — PATIENT MESSAGE (OUTPATIENT)
Dept: FAMILY MEDICINE CLINIC | Age: 58
End: 2023-12-06

## 2023-12-06 DIAGNOSIS — R05.9 COUGH: ICD-10-CM

## 2023-12-06 RX ORDER — DEXTROMETHORPHAN HYDROBROMIDE AND PROMETHAZINE HYDROCHLORIDE 15; 6.25 MG/5ML; MG/5ML
5 SYRUP ORAL 4 TIMES DAILY PRN
Qty: 120 ML | Refills: 0 | Status: SHIPPED | OUTPATIENT
Start: 2023-12-06 | End: 2023-12-13

## 2023-12-06 NOTE — TELEPHONE ENCOUNTER
From: Pushpa Cabello. To: Dino Ford  Sent: 12/6/2023 6:40 AM EST  Subject: Refill    I would like to have a refill of cough medication Promethazine-DM sent to [facility].     Thank you,  Octavio Ugarte

## 2023-12-12 ENCOUNTER — OFFICE VISIT (OUTPATIENT)
Dept: FAMILY MEDICINE CLINIC | Age: 58
End: 2023-12-12
Payer: COMMERCIAL

## 2023-12-12 ENCOUNTER — OFFICE VISIT (OUTPATIENT)
Age: 58
End: 2023-12-12
Payer: COMMERCIAL

## 2023-12-12 VITALS
DIASTOLIC BLOOD PRESSURE: 68 MMHG | HEIGHT: 76 IN | BODY MASS INDEX: 27.05 KG/M2 | WEIGHT: 222.1 LBS | RESPIRATION RATE: 16 BRPM | SYSTOLIC BLOOD PRESSURE: 132 MMHG | HEART RATE: 88 BPM

## 2023-12-12 VITALS
BODY MASS INDEX: 27.76 KG/M2 | HEIGHT: 76 IN | DIASTOLIC BLOOD PRESSURE: 67 MMHG | SYSTOLIC BLOOD PRESSURE: 130 MMHG | WEIGHT: 228 LBS | HEART RATE: 108 BPM

## 2023-12-12 DIAGNOSIS — I73.9 PAD (PERIPHERAL ARTERY DISEASE) (HCC): Primary | ICD-10-CM

## 2023-12-12 DIAGNOSIS — H60.392 INFECTION OF SKIN OF LEFT EAR LOBE: Primary | ICD-10-CM

## 2023-12-12 DIAGNOSIS — Z23 NEED FOR TDAP VACCINATION: ICD-10-CM

## 2023-12-12 PROCEDURE — 99213 OFFICE O/P EST LOW 20 MIN: CPT | Performed by: NURSE PRACTITIONER

## 2023-12-12 PROCEDURE — 90471 IMMUNIZATION ADMIN: CPT | Performed by: NURSE PRACTITIONER

## 2023-12-12 PROCEDURE — 99215 OFFICE O/P EST HI 40 MIN: CPT | Performed by: THORACIC SURGERY (CARDIOTHORACIC VASCULAR SURGERY)

## 2023-12-12 PROCEDURE — 3075F SYST BP GE 130 - 139MM HG: CPT | Performed by: THORACIC SURGERY (CARDIOTHORACIC VASCULAR SURGERY)

## 2023-12-12 PROCEDURE — 90715 TDAP VACCINE 7 YRS/> IM: CPT | Performed by: NURSE PRACTITIONER

## 2023-12-12 PROCEDURE — 3078F DIAST BP <80 MM HG: CPT | Performed by: THORACIC SURGERY (CARDIOTHORACIC VASCULAR SURGERY)

## 2023-12-12 PROCEDURE — 3075F SYST BP GE 130 - 139MM HG: CPT | Performed by: NURSE PRACTITIONER

## 2023-12-12 PROCEDURE — 3078F DIAST BP <80 MM HG: CPT | Performed by: NURSE PRACTITIONER

## 2023-12-12 PROCEDURE — 10060 I&D ABSCESS SIMPLE/SINGLE: CPT | Performed by: NURSE PRACTITIONER

## 2023-12-12 RX ORDER — SULFAMETHOXAZOLE AND TRIMETHOPRIM 800; 160 MG/1; MG/1
1 TABLET ORAL 2 TIMES DAILY
Qty: 10 TABLET | Refills: 0 | Status: SHIPPED | OUTPATIENT
Start: 2023-12-12 | End: 2023-12-17

## 2023-12-12 ASSESSMENT — ENCOUNTER SYMPTOMS: COLOR CHANGE: 1

## 2023-12-12 NOTE — PATIENT INSTRUCTIONS
If you receive a survey asking about your care experience, please respond. Your answers will help ensure you receive high-quality care at this office. Thank you! Your Medical Assistant today: Jillian Calvillo. Thank you for coming to our office! It was a pleasure to serve you.

## 2023-12-12 NOTE — PROGRESS NOTES
mL, Rfl: 0    hydrOXYzine HCl (ATARAX) 50 MG tablet, Take 1 tablet by mouth 3 times daily as needed for Itching, Disp: 270 tablet, Rfl: 1    [START ON 2/18/2024] HYDROcodone-acetaminophen (NORCO)  MG per tablet, Take 1 tablet by mouth every 8 hours as needed for Pain for up to 30 days. Fill on or after 2/18/24, Disp: 90 tablet, Rfl: 0    [START ON 1/19/2024] HYDROcodone-acetaminophen (NORCO)  MG per tablet, Take 1 tablet by mouth every 8 hours as needed for Pain for up to 30 days. Fill on or after 1/19/24, Disp: 90 tablet, Rfl: 0    [START ON 12/20/2023] HYDROcodone-acetaminophen (NORCO)  MG per tablet, Take 1 tablet by mouth every 8 hours as needed for Pain for up to 30 days. Fill on or after 12/20/23, Disp: 90 tablet, Rfl: 0    hydrocortisone 2.5 % cream, Apply topically 3 times daily, Disp: 30 g, Rfl: 0    lisinopril (PRINIVIL;ZESTRIL) 40 MG tablet, TAKE 1 TABLET DAILY, Disp: 90 tablet, Rfl: 3    HYDROcodone-acetaminophen (NORCO)  MG per tablet, Take 1 tablet by mouth every 8 hours as needed for Pain for up to 30 days. Fill on or after 11/20/23, Disp: 90 tablet, Rfl: 0    cilostazol (PLETAL) 100 MG tablet, TAKE 1 TABLET BY MOUTH 2 TIMES A DAY, Disp: 60 tablet, Rfl: 0    allopurinol (ZYLOPRIM) 300 MG tablet, Take 1 tablet by mouth 2 times daily, Disp: 90 tablet, Rfl: 3    atorvastatin (LIPITOR) 40 MG tablet, TAKE 1 TABLET NIGHTLY, Disp: 90 tablet, Rfl: 3    indomethacin (INDOCIN) 50 MG capsule, Take 1 capsule by mouth 3 times daily as needed for Pain Take with food. , Disp: 90 capsule, Rfl: 1    tadalafil (CIALIS) 20 MG tablet, Take 1 tablet by mouth as needed for Erectile Dysfunction, Disp: 30 tablet, Rfl: 3    nystatin (MYCOSTATIN) 934036 UNIT/GM cream, Apply topically 2 times daily Apply topically 2 times daily. , Disp: , Rfl:     nystatin-triamcinolone (MYCOLOG II) 993625-3.1 UNIT/GM-% cream, Apply topically 2 times daily. , Disp: 60 g, Rfl: 2    Misc Natural Products (TART CHERRY ADVANCED

## 2023-12-13 NOTE — PROGRESS NOTES
Immunizations Administered       Name Date Dose Route    TDaP, ADACEL (age 6y-58y), [place] Filler (age 10y+), IM, 0.5mL 12/12/2023 0.5 mL Intramuscular    Site: Deltoid- Right    Lot: Fritz Bonner    NDC: 03364-293-67           After obtaining consent, and per orders of Fantasma Ricardo CNP, injection of Boostrix given in Right deltoid by Susanna Harmon CMA ([facility]). Patient instructed to report any adverse reaction to me immediately.
Infection of skin of left ear lobe  sulfamethoxazole-trimethoprim (BACTRIM DS) 800-160 MG per tablet    75749 - MI DRAIN SKIN ABSCESS SIMPLE      2. Need for Tdap vaccination  Tdap, BOOSTRIX, (age 8 yrs+), IM            MDM:  I&D  Bactrim BID x 5 days  TDAP in office  RTO PRN    Risks/benefits of procedure discussed, consent signed. Area of cyst prepped in sterile fashion. 11-blade used to make small incision in the cyst.  Purulent drainage expressed and cystic material removed. No packing placed. Abx ointment and bandage applied. Home instructions given      An electronic signature was used to authenticate this note.     --Erasto Campos, APRN - CNP

## 2024-01-30 RX ORDER — CILOSTAZOL 100 MG/1
100 TABLET ORAL 2 TIMES DAILY
Qty: 180 TABLET | Refills: 0 | Status: SHIPPED | OUTPATIENT
Start: 2024-01-30

## 2024-02-19 ENCOUNTER — TELEPHONE (OUTPATIENT)
Dept: FAMILY MEDICINE CLINIC | Age: 59
End: 2024-02-19

## 2024-02-19 DIAGNOSIS — K64.4 EXTERNAL HEMORRHOID: Primary | ICD-10-CM

## 2024-02-19 NOTE — TELEPHONE ENCOUNTER
Pt called office requesting a refill of the Hydrocortisone Cream 2.5% to [facility]. He is also asking for a BIGGER tube at a time if possible. If no call back he will check with them after 2pm. Refill if appropriate.

## 2024-03-19 DIAGNOSIS — M15.9 PRIMARY OSTEOARTHRITIS INVOLVING MULTIPLE JOINTS: ICD-10-CM

## 2024-03-19 DIAGNOSIS — M51.16 LUMBAR DISC DISEASE WITH RADICULOPATHY: ICD-10-CM

## 2024-03-19 DIAGNOSIS — Z98.890 HX OF LUMBAR DISCECTOMY: ICD-10-CM

## 2024-03-19 RX ORDER — HYDROCODONE BITARTRATE AND ACETAMINOPHEN 10; 325 MG/1; MG/1
1 TABLET ORAL EVERY 8 HOURS PRN
Qty: 90 TABLET | Refills: 0 | Status: SHIPPED | OUTPATIENT
Start: 2024-04-18 | End: 2024-05-18

## 2024-03-19 RX ORDER — HYDROCODONE BITARTRATE AND ACETAMINOPHEN 10; 325 MG/1; MG/1
1 TABLET ORAL EVERY 8 HOURS PRN
Qty: 90 TABLET | Refills: 0 | Status: SHIPPED | OUTPATIENT
Start: 2024-03-19 | End: 2024-04-18

## 2024-03-19 RX ORDER — HYDROCODONE BITARTRATE AND ACETAMINOPHEN 10; 325 MG/1; MG/1
1 TABLET ORAL EVERY 8 HOURS PRN
Qty: 90 TABLET | Refills: 0 | Status: SHIPPED | OUTPATIENT
Start: 2024-05-18 | End: 2024-06-17

## 2024-03-19 NOTE — TELEPHONE ENCOUNTER
Shivani called and needs a 3 month supply of norco sent to his pharmacy.     He will check with the pharmacy after 4:00 today.

## 2024-04-10 ENCOUNTER — TELEPHONE (OUTPATIENT)
Dept: FAMILY MEDICINE CLINIC | Age: 59
End: 2024-04-10

## 2024-04-10 ASSESSMENT — PATIENT HEALTH QUESTIONNAIRE - PHQ9
SUM OF ALL RESPONSES TO PHQ QUESTIONS 1-9: 0
SUM OF ALL RESPONSES TO PHQ QUESTIONS 1-9: 0
2. FEELING DOWN, DEPRESSED OR HOPELESS: NOT AT ALL
2. FEELING DOWN, DEPRESSED OR HOPELESS: NOT AT ALL
1. LITTLE INTEREST OR PLEASURE IN DOING THINGS: NOT AT ALL
SUM OF ALL RESPONSES TO PHQ9 QUESTIONS 1 & 2: 0
SUM OF ALL RESPONSES TO PHQ QUESTIONS 1-9: 0
SUM OF ALL RESPONSES TO PHQ QUESTIONS 1-9: 0
1. LITTLE INTEREST OR PLEASURE IN DOING THINGS: NOT AT ALL
SUM OF ALL RESPONSES TO PHQ9 QUESTIONS 1 & 2: 0

## 2024-04-10 NOTE — TELEPHONE ENCOUNTER
Received a call from Eva at [facility] stating that she had a critical result on the patients CT Cardiac Scoring.  She stated that is score was 1864 that places him at grater than 95% for CAD and the recommendation is for him to see cardiology.

## 2024-04-11 ENCOUNTER — OFFICE VISIT (OUTPATIENT)
Dept: FAMILY MEDICINE CLINIC | Age: 59
End: 2024-04-11
Payer: COMMERCIAL

## 2024-04-11 VITALS
WEIGHT: 219.2 LBS | HEART RATE: 72 BPM | RESPIRATION RATE: 16 BRPM | BODY MASS INDEX: 26.68 KG/M2 | SYSTOLIC BLOOD PRESSURE: 122 MMHG | DIASTOLIC BLOOD PRESSURE: 70 MMHG

## 2024-04-11 DIAGNOSIS — Z82.49 FAMILY HISTORY OF EARLY CAD: ICD-10-CM

## 2024-04-11 DIAGNOSIS — E78.00 HYPERCHOLESTEREMIA: ICD-10-CM

## 2024-04-11 DIAGNOSIS — F11.281: ICD-10-CM

## 2024-04-11 DIAGNOSIS — Z12.5 SCREENING PSA (PROSTATE SPECIFIC ANTIGEN): ICD-10-CM

## 2024-04-11 DIAGNOSIS — R73.01 IFG (IMPAIRED FASTING GLUCOSE): ICD-10-CM

## 2024-04-11 DIAGNOSIS — R93.1 HIGH CORONARY ARTERY CALCIUM SCORE: Primary | ICD-10-CM

## 2024-04-11 DIAGNOSIS — I73.9 PAD (PERIPHERAL ARTERY DISEASE) (HCC): ICD-10-CM

## 2024-04-11 PROCEDURE — 99214 OFFICE O/P EST MOD 30 MIN: CPT | Performed by: NURSE PRACTITIONER

## 2024-04-11 PROCEDURE — G2211 COMPLEX E/M VISIT ADD ON: HCPCS | Performed by: NURSE PRACTITIONER

## 2024-04-11 PROCEDURE — 3074F SYST BP LT 130 MM HG: CPT | Performed by: NURSE PRACTITIONER

## 2024-04-11 PROCEDURE — 3078F DIAST BP <80 MM HG: CPT | Performed by: NURSE PRACTITIONER

## 2024-04-11 ASSESSMENT — ENCOUNTER SYMPTOMS
SHORTNESS OF BREATH: 0
COUGH: 0
ABDOMINAL PAIN: 1
BACK PAIN: 1
NAUSEA: 0

## 2024-04-11 NOTE — PROGRESS NOTES
Subjective:      Patient ID: Quoc Dominguez Jr. 1965 is a 58 y.o. male here for evaluation.     Chief Complaint   Patient presents with    Follow-up    Results       Patient Active Problem List   Diagnosis    HTN (hypertension)    Hypercholesteremia    Insomnia    Spine pain,  L-S- chronic pain    Varicose veins of both lower extremities    GERD (gastroesophageal reflux disease)    ED (erectile dysfunction)    Allergic rhinitis    Itchy skin    Medication monitoring encounter    Chronic cervical pain with radicular shoulder pain.    Chronic abdominal pain, reflux/heartburn.    Bilateral knee pain, rt>lt.    Lumbar disc disease with radiculopathy, L5-S1 with sciatica    Chronic anxiety    Hx of lumbar discectomy, L5-S1, with one level fusion, 11/18/16.    Primary osteoarthritis of right knee, bone on bone    Pain of right hip joint    Ventral hernia without obstruction or gangrene    Rectal bleeding    Acute GI bleeding    Hypovolemic shock (HCC)    Hemorrhagic shock (HCC)    Opioid dependence with opioid-induced sexual dysfunction (HCC)    Acquired buried penis    History of operative procedure on lumbosacral spinal structure    Intermittent claudication (HCC)    PVD (peripheral vascular disease) ([facility])       COLONOSCOPY - 2018    [facility] - Dr. Murillo  [facility] - Dr. Gilman    Here for results of CT Cardiac Score.  Dad had early CAD.   Has appointment with CARDIO at [facility] tomorrow 4/11/24    Denies CP, SOB or chest tightness with activity    CT CARDIAC 2024  IMPRESSION:    A Calcium Score of 1864 places the patient in the approximate greater than 95th   percentile, based on the [place] data calculator. Extensive coronary artery atherosclerosis.    High likelihood of at least one significant stenosis.  Recommend consulation with   cardiologist.           Hx of PAD.  2019.  Angiogram in 2019 showed complete occlusion in right arteries with extensive collateral.  Seen Vascular in [place] and placed on Pletal and walking

## 2024-04-12 ENCOUNTER — HOSPITAL ENCOUNTER (OUTPATIENT)
Dept: INTERVENTIONAL RADIOLOGY/VASCULAR | Age: 59
Discharge: HOME | End: 2024-04-12
Attending: THORACIC SURGERY (CARDIOTHORACIC VASCULAR SURGERY)
Payer: COMMERCIAL

## 2024-04-12 DIAGNOSIS — I73.9 PAD (PERIPHERAL ARTERY DISEASE) (HCC): ICD-10-CM

## 2024-04-12 LAB
A/G RATIO: 1.7 (ref 1.5–2.5)
ABSOLUTE BASO #: 100 /CMM (ref 0–200)
ABSOLUTE EOS #: 100 /CMM (ref 0–500)
ABSOLUTE LYMPH #: 1900 /CMM (ref 1000–4800)
ABSOLUTE MONO #: 300 /CMM (ref 0–800)
ABSOLUTE NEUT #: 2000 /CMM (ref 1800–7700)
ALBUMIN SERPL-MCNC: 4.2 G/DL (ref 3.5–5)
ALP BLD-CCNC: 42 IU/L (ref 41–137)
ALT SERPL-CCNC: 44 IU/L (ref 10–40)
ANION GAP SERPL CALCULATED.3IONS-SCNC: 6 MMOL/L (ref 4–12)
AST SERPL-CCNC: 29 IU/L (ref 15–41)
BASOPHILS RELATIVE PERCENT: 1.4 % (ref 0–2)
BILIRUB SERPL-MCNC: 0.5 MG/DL (ref 0.2–1)
BUN BLDV-MCNC: 10 MG/DL (ref 7–20)
CALCIUM SERPL-MCNC: 8.9 MG/DL (ref 8.8–10.5)
CHLORIDE BLD-SCNC: 103 MEQ/L (ref 101–111)
CHOLESTEROL/HDL RELATIVE RISK: 2.8 (ref 4–5)
CHOLESTEROL: 101 MG/DL
CO2: 29 MEQ/L (ref 21–32)
CREAT SERPL-MCNC: 0.58 MG/DL (ref 0.6–1.3)
CREATININE CLEARANCE: >60
DIRECT-LDL / HDL RISK: 1.4
EOSINOPHILS RELATIVE PERCENT: 2.9 % (ref 0–6)
GLUCOSE: 116 MG/DL (ref 70–110)
HCT VFR BLD CALC: 38.8 % (ref 40–49)
HDLC SERPL-MCNC: 35 MG/DL
HEMOGLOBIN: 13.6 GM/DL (ref 13.5–16.5)
LDL CHOLESTEROL DIRECT: 52 MG/DL
LYMPHOCYTES RELATIVE PERCENT: 43.7 % (ref 15–45)
MCH RBC QN AUTO: 33 PG (ref 27.5–33)
MCHC RBC AUTO-ENTMCNC: 35.1 GM/DL (ref 33–36)
MCV RBC AUTO: 94.1 CU MIC (ref 80–97)
MONOCYTES RELATIVE PERCENT: 7 % (ref 2–10)
NEUTROPHILS RELATIVE PERCENT: 45 % (ref 40–70)
NUCLEATED RBCS: 0.1 /100 WBC
PDW BLD-RTO: 13.3 % (ref 12–16)
PLATELET # BLD: 193 TH/CMM (ref 150–400)
POTASSIUM SERPL-SCNC: 4.8 MEQ/L (ref 3.6–5)
PROSTATE SPECIFIC ANTIGEN: 0.57 NG/ML
RBC # BLD: 4.13 MIL/CMM (ref 4.5–6)
SODIUM BLD-SCNC: 138 MEQ/L (ref 135–145)
TOTAL PROTEIN: 6.7 G/DL (ref 6.2–8)
TRIGL SERPL-MCNC: 180 MG/DL
TSH REFLEX: 1.31 MCIU/ML (ref 0.49–4.67)
VLDLC SERPL CALC-MCNC: 36 MG/DL
WBC # BLD: 4.4 TH/CMM (ref 4.4–10.5)

## 2024-04-12 PROCEDURE — 93925 LOWER EXTREMITY STUDY: CPT

## 2024-04-13 LAB
ESTIMATED AVERAGE GLUCOSE: 134 MG/DL
HBA1C MFR BLD: 6.3 % (ref 4.4–6.4)

## 2024-04-16 ENCOUNTER — OFFICE VISIT (OUTPATIENT)
Age: 59
End: 2024-04-16
Payer: COMMERCIAL

## 2024-04-16 VITALS
WEIGHT: 219 LBS | DIASTOLIC BLOOD PRESSURE: 72 MMHG | SYSTOLIC BLOOD PRESSURE: 127 MMHG | HEART RATE: 105 BPM | BODY MASS INDEX: 26.67 KG/M2 | HEIGHT: 76 IN

## 2024-04-16 DIAGNOSIS — I73.9 PAD (PERIPHERAL ARTERY DISEASE) (HCC): Primary | ICD-10-CM

## 2024-04-16 PROCEDURE — 3074F SYST BP LT 130 MM HG: CPT | Performed by: THORACIC SURGERY (CARDIOTHORACIC VASCULAR SURGERY)

## 2024-04-16 PROCEDURE — 3078F DIAST BP <80 MM HG: CPT | Performed by: THORACIC SURGERY (CARDIOTHORACIC VASCULAR SURGERY)

## 2024-04-16 PROCEDURE — 99214 OFFICE O/P EST MOD 30 MIN: CPT | Performed by: THORACIC SURGERY (CARDIOTHORACIC VASCULAR SURGERY)

## 2024-04-16 RX ORDER — CILOSTAZOL 100 MG/1
100 TABLET ORAL 2 TIMES DAILY
Qty: 60 TABLET | Refills: 3 | Status: SHIPPED | OUTPATIENT
Start: 2024-04-16 | End: 2024-04-17

## 2024-04-16 RX ORDER — ASPIRIN 81 MG/1
81 TABLET, COATED ORAL DAILY
COMMUNITY
Start: 2024-04-12

## 2024-04-16 NOTE — PATIENT INSTRUCTIONS
If you receive a survey asking about your care experience, please respond. Your answers will help ensure you receive high-quality care at this office. Thank you!    Your Medical Assistant today: Terri  Thank you for coming to our office! It was a pleasure to serve you.

## 2024-04-16 NOTE — PROGRESS NOTES
CT/CV Surgery Follow Up Office Visit      Patient's Name/Date of Birth: Quoc Dominguez Jr. / 1965 (58 y.o.)    PCP: Raimundo Robledo APRN - MATT    Date: April 16, 2024    We had the pleasure of seeing Quoc Dominguez Jr. in the office today, as you know this is a very pleasant 58 y.o. year old male with a history of hypertension, DM2, hyperlipidemia, gout, arthritis, surgery for lumbar disc, right knee replacement, laser ablation bilateral varicosity, and severe peripheral arterial disease.  He returned to the cardiovascular surgery clinic for follow-up.    He reports much improved walking distance with cilostazol.  He states that he does not feel any claudication on daily usual activity.    He had an arterial Doppler study on 4/12/2024, which demonstrated decreased MICHELLE ( Rt. 0.54, Lt. 0.52-0.54), 50 to 70% stenosis of distal right SFA and moderate stenosis in the mid left popliteal artery.      PastMedical History:  Quoc  has a past medical history of Anxiety, Arthritis, Hyperlipemia, Hypertension, and Ruptured varicose vein.    Past Surgical History:  The patient  has a past surgical history that includes back surgery (11/18/2016); Knee arthroscopy (Bilateral); back surgery (2002); Colonoscopy (10/13/2017); Hemorrhoid surgery (09/28/2018); Colonoscopy (2014); Colonoscopy (2013); Upper gastrointestinal endoscopy (2013); Colonoscopy (Left, 10/21/2018); Upper gastrointestinal endoscopy (10/21/2018); pr office/outpt visit,procedure only (N/A, 10/24/2018); and joint replacement (Right, 07/18/2019).    Allergies:  The patient has No Known Allergies.    Medications:    Current Outpatient Medications:     ASPIRIN LOW DOSE 81 MG EC tablet, Take 1 tablet by mouth daily, Disp: , Rfl:     [START ON 5/18/2024] HYDROcodone-acetaminophen (NORCO)  MG per tablet, Take 1 tablet by mouth every 8 hours as needed for Pain for up to 30 days. Fill on or after 5/18/24, Disp: 90 tablet, Rfl: 0    [START ON 4/18/2024]

## 2024-04-17 RX ORDER — CILOSTAZOL 100 MG/1
100 TABLET ORAL 2 TIMES DAILY
Qty: 180 TABLET | Refills: 1 | Status: SHIPPED | OUTPATIENT
Start: 2024-04-17

## 2024-04-22 RX ORDER — ALLOPURINOL 300 MG/1
300 TABLET ORAL 2 TIMES DAILY
Qty: 90 TABLET | Refills: 3 | Status: SHIPPED | OUTPATIENT
Start: 2024-04-22

## 2024-04-22 NOTE — TELEPHONE ENCOUNTER
The patient called in requesting a refill on his allopurinol 300mg to be sent to [facility].  Order pended for your signature.      If no call back he will know that the script was sent.

## 2024-04-25 ENCOUNTER — PATIENT MESSAGE (OUTPATIENT)
Dept: FAMILY MEDICINE CLINIC | Age: 59
End: 2024-04-25

## 2024-04-25 DIAGNOSIS — L30.9 DERMATITIS: ICD-10-CM

## 2024-04-25 NOTE — TELEPHONE ENCOUNTER
From: Quoc Dominguez Jr.  To: Raimundo Robledo  Sent: 4/25/2024 12:35 PM EDT  Subject: Refill    Refill  Please send prescription for Nystat/Triam Cre Pada cream to the [facility] pharmacy.    Thank you,  Quoc Dominguez

## 2024-05-30 ENCOUNTER — OFFICE VISIT (OUTPATIENT)
Dept: FAMILY MEDICINE CLINIC | Age: 59
End: 2024-05-30
Payer: COMMERCIAL

## 2024-05-30 VITALS
RESPIRATION RATE: 16 BRPM | SYSTOLIC BLOOD PRESSURE: 114 MMHG | HEIGHT: 76 IN | BODY MASS INDEX: 26.18 KG/M2 | HEART RATE: 88 BPM | DIASTOLIC BLOOD PRESSURE: 66 MMHG | WEIGHT: 215 LBS

## 2024-05-30 DIAGNOSIS — Z98.890 HX OF LUMBAR DISCECTOMY: ICD-10-CM

## 2024-05-30 DIAGNOSIS — M15.9 PRIMARY OSTEOARTHRITIS INVOLVING MULTIPLE JOINTS: ICD-10-CM

## 2024-05-30 DIAGNOSIS — F11.281: ICD-10-CM

## 2024-05-30 DIAGNOSIS — R73.01 IFG (IMPAIRED FASTING GLUCOSE): ICD-10-CM

## 2024-05-30 DIAGNOSIS — E78.00 HYPERCHOLESTEREMIA: ICD-10-CM

## 2024-05-30 DIAGNOSIS — B35.9 TINEA: ICD-10-CM

## 2024-05-30 DIAGNOSIS — I25.10 NONOBSTRUCTIVE ATHEROSCLEROSIS OF CORONARY ARTERY: ICD-10-CM

## 2024-05-30 DIAGNOSIS — M51.16 LUMBAR DISC DISEASE WITH RADICULOPATHY: Primary | ICD-10-CM

## 2024-05-30 DIAGNOSIS — I10 PRIMARY HYPERTENSION: ICD-10-CM

## 2024-05-30 PROCEDURE — 3074F SYST BP LT 130 MM HG: CPT | Performed by: NURSE PRACTITIONER

## 2024-05-30 PROCEDURE — G2211 COMPLEX E/M VISIT ADD ON: HCPCS | Performed by: NURSE PRACTITIONER

## 2024-05-30 PROCEDURE — 3078F DIAST BP <80 MM HG: CPT | Performed by: NURSE PRACTITIONER

## 2024-05-30 PROCEDURE — 99214 OFFICE O/P EST MOD 30 MIN: CPT | Performed by: NURSE PRACTITIONER

## 2024-05-30 RX ORDER — METOPROLOL SUCCINATE 25 MG/1
TABLET, EXTENDED RELEASE ORAL
COMMUNITY
Start: 2024-05-01

## 2024-05-30 RX ORDER — HYDROCODONE BITARTRATE AND ACETAMINOPHEN 10; 325 MG/1; MG/1
1 TABLET ORAL EVERY 8 HOURS PRN
Qty: 90 TABLET | Refills: 0 | Status: SHIPPED | OUTPATIENT
Start: 2024-06-17 | End: 2024-07-17

## 2024-05-30 RX ORDER — HYDROCODONE BITARTRATE AND ACETAMINOPHEN 10; 325 MG/1; MG/1
1 TABLET ORAL EVERY 8 HOURS PRN
Qty: 90 TABLET | Refills: 0 | Status: SHIPPED | OUTPATIENT
Start: 2024-07-17 | End: 2024-08-16

## 2024-05-30 RX ORDER — TERBINAFINE HYDROCHLORIDE 250 MG/1
250 TABLET ORAL DAILY
Qty: 21 TABLET | Refills: 0 | Status: SHIPPED | OUTPATIENT
Start: 2024-05-30 | End: 2024-06-20

## 2024-05-30 RX ORDER — HYDROCODONE BITARTRATE AND ACETAMINOPHEN 10; 325 MG/1; MG/1
1 TABLET ORAL EVERY 8 HOURS PRN
Qty: 90 TABLET | Refills: 0 | Status: SHIPPED | OUTPATIENT
Start: 2024-08-16 | End: 2024-09-15

## 2024-05-30 SDOH — ECONOMIC STABILITY: INCOME INSECURITY: HOW HARD IS IT FOR YOU TO PAY FOR THE VERY BASICS LIKE FOOD, HOUSING, MEDICAL CARE, AND HEATING?: NOT HARD AT ALL

## 2024-05-30 SDOH — ECONOMIC STABILITY: FOOD INSECURITY: WITHIN THE PAST 12 MONTHS, YOU WORRIED THAT YOUR FOOD WOULD RUN OUT BEFORE YOU GOT MONEY TO BUY MORE.: NEVER TRUE

## 2024-05-30 SDOH — ECONOMIC STABILITY: FOOD INSECURITY: WITHIN THE PAST 12 MONTHS, THE FOOD YOU BOUGHT JUST DIDN'T LAST AND YOU DIDN'T HAVE MONEY TO GET MORE.: NEVER TRUE

## 2024-05-30 ASSESSMENT — ENCOUNTER SYMPTOMS
SHORTNESS OF BREATH: 0
ABDOMINAL PAIN: 1
NAUSEA: 0
BACK PAIN: 1
COUGH: 0

## 2024-05-30 NOTE — PROGRESS NOTES
(age 10y+), IM, 0.5mL 12/12/2023    Zoster Recombinant (Shingrix) 11/28/2022, 05/02/2023       Review of Systems   Constitutional:  Negative for chills and fever.   HENT: Negative.     Respiratory:  Negative for cough and shortness of breath.    Cardiovascular:  Negative for chest pain.   Gastrointestinal:  Positive for abdominal pain. Negative for nausea.   Musculoskeletal:  Positive for arthralgias and back pain.   Skin:  Negative for rash.   Neurological:  Negative for dizziness, light-headedness and headaches.   Psychiatric/Behavioral:  The patient is nervous/anxious.        Objective:   Physical Exam  Constitutional:       General: He is not in acute distress.  Eyes:      Pupils: Pupils are equal, round, and reactive to light.   Cardiovascular:      Rate and Rhythm: Normal rate and regular rhythm.      Heart sounds: No murmur heard.  Pulmonary:      Effort: Pulmonary effort is normal.      Breath sounds: Normal breath sounds. No wheezing.   Abdominal:      General: Bowel sounds are normal. There is no distension.      Palpations: Abdomen is soft.      Tenderness: There is no abdominal tenderness.   Genitourinary:     Rectum: External hemorrhoid present.   Musculoskeletal:      Cervical back: Normal range of motion and neck supple.      Thoracic back: Decreased range of motion.      Lumbar back: Tenderness and bony tenderness present. Decreased range of motion.      Right knee: Tenderness present.      Left knee: Tenderness (crepitus) present.   Skin:     General: Skin is warm and dry.      Findings: No rash.      Comments: Erythematous, circular rash noted in groin region.  Itching.    Psychiatric:         Mood and Affect: Mood is anxious.         Judgment: Judgment is impulsive.          Assessment:       Diagnosis Orders   1. Lumbar disc disease with radiculopathy, L5-S1 with sciatica  HYDROcodone-acetaminophen (NORCO)  MG per tablet    HYDROcodone-acetaminophen (NORCO)  MG per tablet

## 2024-05-31 PROBLEM — I25.10 NONOBSTRUCTIVE ATHEROSCLEROSIS OF CORONARY ARTERY: Status: ACTIVE | Noted: 2024-05-31

## 2024-06-03 ENCOUNTER — TELEPHONE (OUTPATIENT)
Dept: FAMILY MEDICINE CLINIC | Age: 59
End: 2024-06-03

## 2024-06-03 NOTE — TELEPHONE ENCOUNTER
Received notification patient   terbinafine (LAMISIL) 250 MG  required PA. Pa completed on Cover my meds.       Outcome  Approved today  Your PA request has been approved. Additional information will be provided in the approval communication. (Message 1145)    Called pharmacy and left a message with approval.     Called patient and informed approved.

## 2024-08-03 ENCOUNTER — PATIENT MESSAGE (OUTPATIENT)
Dept: FAMILY MEDICINE CLINIC | Age: 59
End: 2024-08-03

## 2024-08-05 RX ORDER — INDOMETHACIN 50 MG/1
50 CAPSULE ORAL 3 TIMES DAILY PRN
Qty: 90 CAPSULE | Refills: 1 | Status: SHIPPED | OUTPATIENT
Start: 2024-08-05

## 2024-08-05 NOTE — TELEPHONE ENCOUNTER
From: Quoc Dominguez Jr.  To: Raimundo Robledo  Sent: 8/3/2024 2:06 PM EDT  Subject: Refill    Raimundo would you send a refill to [facility] for Indomethacin 50 mg. 3xdaily?    Thank you,   Quoc Dominguez

## 2024-08-14 ENCOUNTER — PATIENT MESSAGE (OUTPATIENT)
Dept: FAMILY MEDICINE CLINIC | Age: 59
End: 2024-08-14

## 2024-08-15 RX ORDER — LISINOPRIL 40 MG/1
TABLET ORAL
Qty: 90 TABLET | Refills: 3 | Status: SHIPPED | OUTPATIENT
Start: 2024-08-15

## 2024-08-15 RX ORDER — ALLOPURINOL 300 MG/1
300 TABLET ORAL 2 TIMES DAILY
Qty: 90 TABLET | Refills: 3 | Status: SHIPPED | OUTPATIENT
Start: 2024-08-15 | End: 2024-08-15 | Stop reason: SDUPTHER

## 2024-08-15 RX ORDER — ALLOPURINOL 300 MG/1
300 TABLET ORAL 2 TIMES DAILY
Qty: 180 TABLET | Refills: 3 | Status: SHIPPED | OUTPATIENT
Start: 2024-08-15

## 2024-08-19 RX ORDER — TADALAFIL 20 MG/1
20 TABLET ORAL PRN
Qty: 30 TABLET | Refills: 0 | Status: SHIPPED | OUTPATIENT
Start: 2024-08-19 | End: 2025-08-19

## 2024-08-25 ENCOUNTER — PATIENT MESSAGE (OUTPATIENT)
Dept: FAMILY MEDICINE CLINIC | Age: 59
End: 2024-08-25

## 2024-08-25 DIAGNOSIS — E78.00 HYPERCHOLESTEREMIA: ICD-10-CM

## 2024-08-26 RX ORDER — ATORVASTATIN CALCIUM 40 MG/1
TABLET, FILM COATED ORAL
Qty: 90 TABLET | Refills: 3 | Status: SHIPPED | OUTPATIENT
Start: 2024-08-26

## 2024-08-29 ENCOUNTER — TELEPHONE (OUTPATIENT)
Dept: FAMILY MEDICINE CLINIC | Age: 59
End: 2024-08-29

## 2024-08-29 DIAGNOSIS — L90.5 SCAR TISSUE: Primary | ICD-10-CM

## 2024-08-29 RX ORDER — BETAMETHASONE VALERATE 1.2 MG/G
CREAM TOPICAL
Qty: 45 G | Refills: 0 | Status: SHIPPED | OUTPATIENT
Start: 2024-08-29 | End: 2024-09-05

## 2024-08-29 NOTE — TELEPHONE ENCOUNTER
Patient called and stated that he is having itching on the right knee. He stated that it started yesterday and he wouldn't thought anything of it but he has had a partial knee replacement. He stated that he has itching right on the incision scar. He doesn't know if it could just be scar tissue that itches or what.     He stated that he has tried hydrocortisone cream and it will work for a little bit but then the itching comes back. He stated that its not red or swollen or anything. He stated that there no bump either.     He didn't know if this is concerning or not. If there is something you suggested. He stated that if you think he needs a different cream he uses [facility] for his pharmacy.

## 2024-08-29 NOTE — TELEPHONE ENCOUNTER
Called patient and informed. He verbalized understanding.     He stated that he forgot to ask about drinking cranberry juice. He stated that he no longer drink beer and started doing mixed drink of cranberry and gin. He know he can't drink grapefruit with the medications he is on but wanted to double check about cranberry.     He stated that we can send him a my chart message with the response

## 2024-09-20 ENCOUNTER — PATIENT MESSAGE (OUTPATIENT)
Dept: FAMILY MEDICINE CLINIC | Age: 59
End: 2024-09-20

## 2024-09-20 DIAGNOSIS — M51.16 LUMBAR DISC DISEASE WITH RADICULOPATHY: ICD-10-CM

## 2024-09-20 DIAGNOSIS — Z98.890 HX OF LUMBAR DISCECTOMY: ICD-10-CM

## 2024-09-20 DIAGNOSIS — M15.9 PRIMARY OSTEOARTHRITIS INVOLVING MULTIPLE JOINTS: ICD-10-CM

## 2024-09-20 RX ORDER — HYDROCODONE BITARTRATE AND ACETAMINOPHEN 10; 325 MG/1; MG/1
1 TABLET ORAL EVERY 8 HOURS PRN
Qty: 90 TABLET | Refills: 0 | Status: SHIPPED | OUTPATIENT
Start: 2024-11-19 | End: 2024-12-19

## 2024-09-20 RX ORDER — HYDROCODONE BITARTRATE AND ACETAMINOPHEN 10; 325 MG/1; MG/1
1 TABLET ORAL EVERY 8 HOURS PRN
Qty: 90 TABLET | Refills: 0 | Status: SHIPPED | OUTPATIENT
Start: 2024-10-20 | End: 2024-11-19

## 2024-09-20 RX ORDER — HYDROCODONE BITARTRATE AND ACETAMINOPHEN 10; 325 MG/1; MG/1
1 TABLET ORAL EVERY 8 HOURS PRN
Qty: 90 TABLET | Refills: 0 | Status: SHIPPED | OUTPATIENT
Start: 2024-09-20 | End: 2024-10-20

## 2024-09-25 ENCOUNTER — OFFICE VISIT (OUTPATIENT)
Dept: FAMILY MEDICINE CLINIC | Age: 59
End: 2024-09-25
Payer: COMMERCIAL

## 2024-09-25 ENCOUNTER — TELEPHONE (OUTPATIENT)
Dept: FAMILY MEDICINE CLINIC | Age: 59
End: 2024-09-25

## 2024-09-25 VITALS
RESPIRATION RATE: 16 BRPM | DIASTOLIC BLOOD PRESSURE: 56 MMHG | WEIGHT: 225.3 LBS | HEIGHT: 76 IN | HEART RATE: 76 BPM | BODY MASS INDEX: 27.43 KG/M2 | SYSTOLIC BLOOD PRESSURE: 110 MMHG

## 2024-09-25 DIAGNOSIS — B35.9 TINEA: ICD-10-CM

## 2024-09-25 DIAGNOSIS — F40.240 CLAUSTROPHOBIA: ICD-10-CM

## 2024-09-25 DIAGNOSIS — M77.41 METATARSALGIA OF RIGHT FOOT: Primary | ICD-10-CM

## 2024-09-25 PROBLEM — Z82.49 FAMILY HISTORY OF CARDIAC DISORDER: Status: ACTIVE | Noted: 2024-09-25

## 2024-09-25 PROBLEM — I25.84 CALCIFICATION OF CORONARY ARTERY: Status: ACTIVE | Noted: 2024-09-25

## 2024-09-25 PROBLEM — I25.10 CALCIFICATION OF CORONARY ARTERY: Status: ACTIVE | Noted: 2024-09-25

## 2024-09-25 PROCEDURE — 3078F DIAST BP <80 MM HG: CPT | Performed by: NURSE PRACTITIONER

## 2024-09-25 PROCEDURE — 99213 OFFICE O/P EST LOW 20 MIN: CPT | Performed by: NURSE PRACTITIONER

## 2024-09-25 PROCEDURE — 3074F SYST BP LT 130 MM HG: CPT | Performed by: NURSE PRACTITIONER

## 2024-09-25 PROCEDURE — G2211 COMPLEX E/M VISIT ADD ON: HCPCS | Performed by: NURSE PRACTITIONER

## 2024-09-25 RX ORDER — LORAZEPAM 0.5 MG/1
0.5 TABLET ORAL
Qty: 1 TABLET | Refills: 0 | Status: SHIPPED | OUTPATIENT
Start: 2024-09-25 | End: 2024-09-25

## 2024-09-25 RX ORDER — PREDNISONE 50 MG/1
50 TABLET ORAL DAILY
Qty: 4 TABLET | Refills: 0 | Status: SHIPPED | OUTPATIENT
Start: 2024-09-25 | End: 2024-09-29

## 2024-09-25 RX ORDER — TERBINAFINE HYDROCHLORIDE 250 MG/1
250 TABLET ORAL DAILY
Qty: 21 TABLET | Refills: 0 | Status: SHIPPED | OUTPATIENT
Start: 2024-09-25 | End: 2024-10-16

## 2024-09-25 ASSESSMENT — ENCOUNTER SYMPTOMS
ABDOMINAL PAIN: 0
SHORTNESS OF BREATH: 0
COUGH: 0
NAUSEA: 0

## 2024-09-30 RX ORDER — CILOSTAZOL 100 MG/1
100 TABLET ORAL 2 TIMES DAILY
Qty: 180 TABLET | Refills: 1 | Status: SHIPPED | OUTPATIENT
Start: 2024-09-30

## 2024-10-01 ENCOUNTER — TELEPHONE (OUTPATIENT)
Dept: FAMILY MEDICINE CLINIC | Age: 59
End: 2024-10-01

## 2024-10-01 DIAGNOSIS — F40.240 CLAUSTROPHOBIA: ICD-10-CM

## 2024-10-01 RX ORDER — LORAZEPAM 0.5 MG/1
0.5 TABLET ORAL
Qty: 1 TABLET | Refills: 0 | Status: SHIPPED | OUTPATIENT
Start: 2024-10-01 | End: 2024-10-01

## 2024-10-01 NOTE — TELEPHONE ENCOUNTER
Patient called and stated that he went to have his MRI completed at [facility] and he took the dose of lorazepam before the appointment. However its not scheduled until tomorrow. He stated they were able to do it today.     He would like another prescription for lorazepam sent into the pharmacy so he can take it before the MRI which is scheduled tomorrow.     Patient will check with kaylin after 4:00 pm today

## 2024-10-15 ENCOUNTER — OFFICE VISIT (OUTPATIENT)
Age: 59
End: 2024-10-15
Payer: COMMERCIAL

## 2024-10-15 VITALS
DIASTOLIC BLOOD PRESSURE: 82 MMHG | BODY MASS INDEX: 27.47 KG/M2 | HEART RATE: 83 BPM | WEIGHT: 225.6 LBS | HEIGHT: 76 IN | SYSTOLIC BLOOD PRESSURE: 162 MMHG

## 2024-10-15 DIAGNOSIS — I73.9 PAD (PERIPHERAL ARTERY DISEASE) (HCC): Primary | ICD-10-CM

## 2024-10-15 PROCEDURE — 3079F DIAST BP 80-89 MM HG: CPT | Performed by: THORACIC SURGERY (CARDIOTHORACIC VASCULAR SURGERY)

## 2024-10-15 PROCEDURE — 3077F SYST BP >= 140 MM HG: CPT | Performed by: THORACIC SURGERY (CARDIOTHORACIC VASCULAR SURGERY)

## 2024-10-15 PROCEDURE — 99214 OFFICE O/P EST MOD 30 MIN: CPT | Performed by: THORACIC SURGERY (CARDIOTHORACIC VASCULAR SURGERY)

## 2024-10-15 NOTE — PATIENT INSTRUCTIONS
If you receive a survey asking about your care experience, please respond. Your answers will help ensure you receive high-quality care at this office. Thank you!    Your Medical Assistant today: Coco DOYLE  Thank you for coming to our office! It was a pleasure to serve you.

## 2024-10-15 NOTE — PROGRESS NOTES
CT/CV Surgery Follow Up Office Visit      Patient's Name/Date of Birth: Quoc Dominguez Jr. / 1965 (58 y.o.)    PCP: Raimundo Robledo APRN - MATT    Date: October 15, 2024    We had the pleasure of seeing Quoc Dominguez Jr. in the office today, as you know this is a very pleasant 58 y.o. year old male with a history of hypertension, DM2, hyperlipidemia, gout, arthritis, surgery for lumbar disc, lumbar disc herniation, right knee replacement, laser ablation of the bilateral varicosity, and peripheral arterial disease.  He returned to the cardiovascular surgery clinic for follow-up.    He reports much improved walking distance with current conservative treatment and cilostazol.  He states that he he walks a mile every day without any difficulty.  He also reports that he was found to have sciatica due to lumbar disc herniation.  He has been tolerating well with cilostazol 100 mg twice a day.    PastMedical History:  Quoc  has a past medical history of Anxiety, Arthritis, Hyperlipemia, Hypertension, and Ruptured varicose vein.    Past Surgical History:  The patient  has a past surgical history that includes back surgery (11/18/2016); Knee arthroscopy (Bilateral); back surgery (2002); Colonoscopy (10/13/2017); Hemorrhoid surgery (09/28/2018); Colonoscopy (2014); Colonoscopy (2013); Upper gastrointestinal endoscopy (2013); Colonoscopy (Left, 10/21/2018); Upper gastrointestinal endoscopy (10/21/2018); pr office/outpt visit,procedure only (N/A, 10/24/2018); and joint replacement (Right, 07/18/2019).    Allergies:  The patient has No Known Allergies.    Medications:    Current Outpatient Medications:     cilostazol (PLETAL) 100 MG tablet, Take 1 tablet by mouth 2 times daily, Disp: 180 tablet, Rfl: 1    terbinafine (LAMISIL) 250 MG tablet, Take 1 tablet by mouth daily for 21 days, Disp: 21 tablet, Rfl: 0    HYDROcodone-acetaminophen (NORCO)  MG per tablet, Take 1 tablet by mouth every 8 hours as needed for

## 2024-10-22 ENCOUNTER — PATIENT MESSAGE (OUTPATIENT)
Dept: FAMILY MEDICINE CLINIC | Age: 59
End: 2024-10-22

## 2024-10-22 DIAGNOSIS — M51.16 LUMBAR DISC DISEASE WITH RADICULOPATHY: ICD-10-CM

## 2024-10-22 DIAGNOSIS — Z98.890 HX OF LUMBAR DISCECTOMY: ICD-10-CM

## 2024-10-22 DIAGNOSIS — M15.0 PRIMARY OSTEOARTHRITIS INVOLVING MULTIPLE JOINTS: ICD-10-CM

## 2024-10-22 RX ORDER — HYDROCODONE BITARTRATE AND ACETAMINOPHEN 10; 325 MG/1; MG/1
1 TABLET ORAL EVERY 8 HOURS PRN
Qty: 90 TABLET | Refills: 0 | Status: SHIPPED | OUTPATIENT
Start: 2024-12-21 | End: 2025-01-20

## 2024-10-22 RX ORDER — HYDROCODONE BITARTRATE AND ACETAMINOPHEN 10; 325 MG/1; MG/1
1 TABLET ORAL EVERY 8 HOURS PRN
Qty: 90 TABLET | Refills: 0 | Status: SHIPPED | OUTPATIENT
Start: 2024-10-22 | End: 2024-11-21

## 2024-10-22 RX ORDER — HYDROCODONE BITARTRATE AND ACETAMINOPHEN 10; 325 MG/1; MG/1
1 TABLET ORAL EVERY 8 HOURS PRN
Qty: 90 TABLET | Refills: 0 | Status: SHIPPED | OUTPATIENT
Start: 2024-11-21 | End: 2024-12-21

## 2024-10-31 ENCOUNTER — OFFICE VISIT (OUTPATIENT)
Dept: FAMILY MEDICINE CLINIC | Age: 59
End: 2024-10-31

## 2024-10-31 VITALS
RESPIRATION RATE: 16 BRPM | HEART RATE: 80 BPM | HEIGHT: 76 IN | DIASTOLIC BLOOD PRESSURE: 66 MMHG | WEIGHT: 220.3 LBS | SYSTOLIC BLOOD PRESSURE: 122 MMHG | BODY MASS INDEX: 26.83 KG/M2

## 2024-10-31 DIAGNOSIS — I25.10 NONOBSTRUCTIVE ATHEROSCLEROSIS OF CORONARY ARTERY: ICD-10-CM

## 2024-10-31 DIAGNOSIS — M15.0 PRIMARY OSTEOARTHRITIS INVOLVING MULTIPLE JOINTS: ICD-10-CM

## 2024-10-31 DIAGNOSIS — I73.9 PAD (PERIPHERAL ARTERY DISEASE) (HCC): ICD-10-CM

## 2024-10-31 DIAGNOSIS — I10 PRIMARY HYPERTENSION: ICD-10-CM

## 2024-10-31 DIAGNOSIS — B35.9 TINEA: ICD-10-CM

## 2024-10-31 DIAGNOSIS — M51.16 LUMBAR DISC DISEASE WITH RADICULOPATHY: ICD-10-CM

## 2024-10-31 DIAGNOSIS — Z01.818 PREOP EXAMINATION: Primary | ICD-10-CM

## 2024-10-31 DIAGNOSIS — R73.01 IFG (IMPAIRED FASTING GLUCOSE): ICD-10-CM

## 2024-10-31 DIAGNOSIS — Z98.890 HX OF LUMBAR DISCECTOMY: ICD-10-CM

## 2024-10-31 DIAGNOSIS — E78.00 HYPERCHOLESTEREMIA: ICD-10-CM

## 2024-10-31 RX ORDER — KETOCONAZOLE 20 MG/ML
SHAMPOO TOPICAL
Qty: 120 ML | Refills: 0 | Status: SHIPPED | OUTPATIENT
Start: 2024-10-31

## 2024-10-31 ASSESSMENT — ENCOUNTER SYMPTOMS
SHORTNESS OF BREATH: 0
ABDOMINAL PAIN: 0
BACK PAIN: 1
COUGH: 0
NAUSEA: 0

## 2024-10-31 NOTE — PATIENT INSTRUCTIONS
You may receive a survey about your visit with us today. The feedback from our patients helps us identify what is working well and where the service to all patients can be enhanced. Thank you!     Hold Indomethacin and Aspirin 1 week before surgery    Take lisinopril and metoprolol only day of surgery

## 2024-10-31 NOTE — PROGRESS NOTES
Subjective:      Patient ID: Quoc Dominguez Jr. 1965 is a 58 y.o. male here for PRE-OP    Chief Complaint   Patient presents with    Pre-op Exam     Back surgery w/ Dr. Usman Hurd in [place] 11/12/24       Patient Active Problem List   Diagnosis    HTN (hypertension)    Hypercholesteremia    Insomnia    Spine pain,  L-S- chronic pain    Varicose veins of both lower extremities    GERD (gastroesophageal reflux disease)    ED (erectile dysfunction)    Allergic rhinitis    Itchy skin    Medication monitoring encounter    Chronic cervical pain with radicular shoulder pain.    Chronic abdominal pain, reflux/heartburn.    Bilateral knee pain, rt>lt.    Lumbar disc disease with radiculopathy, L5-S1 with sciatica    Chronic anxiety    Hx of lumbar discectomy, L5-S1, with one level fusion, 11/18/16.    Primary osteoarthritis of right knee, bone on bone    Pain of right hip joint    Ventral hernia without obstruction or gangrene    Rectal bleeding    Acute GI bleeding    Hypovolemic shock (HCC)    Hemorrhagic shock (HCC)    Opioid dependence with opioid-induced sexual dysfunction (HCC)    Acquired buried penis    History of operative procedure on lumbosacral spinal structure    Intermittent claudication (HCC)    PVD (peripheral vascular disease) (HCC)    Nonobstructive atherosclerosis of coronary artery    Family history of cardiac disorder    Calcification of coronary artery       Past Surgical History:   Procedure Laterality Date    BACK SURGERY  11/18/2016    L-5 and bulging disc repair 2 rods and 4 screws with fusion at [place] with Dr. Usman Hurd    BACK SURGERY  2002    COLONOSCOPY  10/13/2017    Dr Hassan    COLONOSCOPY  2014    Dr Hassan    COLONOSCOPY  2013    COLONOSCOPY Left 10/21/2018    COLONOSCOPY CONTROL HEMORRHAGE performed by Quoc Heard MD at [facility]    HEMORRHOID SURGERY  09/28/2018    banding-Dr Hasasn    JOINT REPLACEMENT Right 07/18/2019    knee partial replacement    KNEE

## 2024-11-02 ENCOUNTER — HOSPITAL ENCOUNTER (OUTPATIENT)
Age: 59
Discharge: HOME | End: 2024-11-02
Payer: COMMERCIAL

## 2024-11-02 ENCOUNTER — HOSPITAL ENCOUNTER (OUTPATIENT)
Dept: GENERAL RADIOLOGY | Age: 59
Discharge: HOME | End: 2024-11-02
Payer: COMMERCIAL

## 2024-11-02 DIAGNOSIS — Z01.818 PREOP EXAMINATION: ICD-10-CM

## 2024-11-02 LAB
ANION GAP SERPL CALC-SCNC: 13 MEQ/L (ref 8–16)
APTT PPP: 26.7 SECONDS (ref 22–38)
BASOPHILS ABSOLUTE: 0 THOU/MM3 (ref 0–0.1)
BASOPHILS NFR BLD AUTO: 0.6 %
BUN SERPL-MCNC: 9 MG/DL (ref 7–22)
CALCIUM SERPL-MCNC: 9 MG/DL (ref 8.5–10.5)
CHLORIDE SERPL-SCNC: 101 MEQ/L (ref 98–111)
CO2 SERPL-SCNC: 25 MEQ/L (ref 23–33)
CREAT SERPL-MCNC: 0.6 MG/DL (ref 0.4–1.2)
DEPRECATED MEAN GLUCOSE BLD GHB EST-ACNC: 132 MG/DL (ref 70–126)
DEPRECATED RDW RBC AUTO: 45.5 FL (ref 35–45)
EKG ATRIAL RATE: 74 BPM
EKG P AXIS: 53 DEGREES
EKG P-R INTERVAL: 156 MS
EKG Q-T INTERVAL: 408 MS
EKG QRS DURATION: 104 MS
EKG QTC CALCULATION (BAZETT): 452 MS
EKG R AXIS: 22 DEGREES
EKG T AXIS: 38 DEGREES
EKG VENTRICULAR RATE: 74 BPM
EOSINOPHIL NFR BLD AUTO: 3.1 %
EOSINOPHILS ABSOLUTE: 0.2 THOU/MM3 (ref 0–0.4)
ERYTHROCYTE [DISTWIDTH] IN BLOOD BY AUTOMATED COUNT: 12.7 % (ref 11.5–14.5)
GFR SERPL CREATININE-BSD FRML MDRD: > 90 ML/MIN/1.73M2
GLUCOSE SERPL-MCNC: 124 MG/DL (ref 70–108)
HBA1C MFR BLD HPLC: 6.4 % (ref 4.4–6.4)
HCT VFR BLD AUTO: 38.8 % (ref 42–52)
HGB BLD-MCNC: 13.3 GM/DL (ref 14–18)
IMM GRANULOCYTES # BLD AUTO: 0.01 THOU/MM3 (ref 0–0.07)
IMM GRANULOCYTES NFR BLD AUTO: 0.2 %
INR PPP: 0.92 (ref 0.85–1.13)
LYMPHOCYTES ABSOLUTE: 2.1 THOU/MM3 (ref 1–4.8)
LYMPHOCYTES NFR BLD AUTO: 42.6 %
MCH RBC QN AUTO: 33.3 PG (ref 26–33)
MCHC RBC AUTO-ENTMCNC: 34.3 GM/DL (ref 32.2–35.5)
MCV RBC AUTO: 97 FL (ref 80–94)
MONOCYTES ABSOLUTE: 0.4 THOU/MM3 (ref 0.4–1.3)
MONOCYTES NFR BLD AUTO: 7.8 %
NEUTROPHILS ABSOLUTE: 2.2 THOU/MM3 (ref 1.8–7.7)
NEUTROPHILS NFR BLD AUTO: 45.7 %
NRBC BLD AUTO-RTO: 0 /100 WBC
PLATELET # BLD AUTO: 174 THOU/MM3 (ref 130–400)
PMV BLD AUTO: 10 FL (ref 9.4–12.4)
POTASSIUM SERPL-SCNC: 4.3 MEQ/L (ref 3.5–5.2)
RBC # BLD AUTO: 4 MILL/MM3 (ref 4.7–6.1)
SODIUM SERPL-SCNC: 139 MEQ/L (ref 135–145)
WBC # BLD AUTO: 4.9 THOU/MM3 (ref 4.8–10.8)

## 2024-11-02 PROCEDURE — 85610 PROTHROMBIN TIME: CPT

## 2024-11-02 PROCEDURE — 85025 COMPLETE CBC W/AUTO DIFF WBC: CPT

## 2024-11-02 PROCEDURE — 71046 X-RAY EXAM CHEST 2 VIEWS: CPT

## 2024-11-02 PROCEDURE — 83036 HEMOGLOBIN GLYCOSYLATED A1C: CPT

## 2024-11-02 PROCEDURE — 93005 ELECTROCARDIOGRAM TRACING: CPT

## 2024-11-02 PROCEDURE — 80048 BASIC METABOLIC PNL TOTAL CA: CPT

## 2024-11-02 PROCEDURE — 36415 COLL VENOUS BLD VENIPUNCTURE: CPT

## 2024-11-02 PROCEDURE — 85730 THROMBOPLASTIN TIME PARTIAL: CPT

## 2024-11-14 ENCOUNTER — CARE COORDINATION (OUTPATIENT)
Dept: CASE MANAGEMENT | Age: 59
End: 2024-11-14

## 2024-11-14 DIAGNOSIS — Z98.1 S/P LUMBAR FUSION: Primary | ICD-10-CM

## 2024-11-14 NOTE — CARE COORDINATION
Care Transitions Note    Initial Call - Call within 2 business days of discharge: Yes    Patient Current Location:  Home: [address] [place] [id number]    Care Transition Nurse contacted the patient by telephone to perform post hospital discharge assessment, verified name and  as identifiers. Provided introduction to self, and explanation of the Care Transition Nurse role.     Patient: Quoc Dominguez Jr.    Patient : 1965   MRN: [id number]    Reason for Admission: s/p L4-L5 lumbar fusion  Discharge Date: 19  RURS: No data recorded      Was this an external facility discharge? Yes. Discharge Date: -. Facility Name: [facility]    Additional needs identified to be addressed with provider   No needs identified             Method of communication with provider: none.    Patients top risk factors for readmission: lack of knowledge about disease, medical condition-s/p lumbar fusion, and polypharmacy    Interventions to address risk factors:   Review of patient management of conditions/medications:    Communication with providers: post op scheduled    Care Summary Note:  Spoke with Quoc, said he is feeling pretty good.  Denies fever, chills, chest pain, dizziness, dyspnea.  Has some back pain but manageable with pain med.  Back incision looks good, no s/sx of infection.  Following instructions given from [facility].  Wearing back brace when up.  Reviewed medications/changes with [facility] discharge med list, taking as directed.  Appetite and fluid intake is good.  No issues with B&B.  Declines to see PCP at this time, was a planned surgery.  Has scheduled appt next month.  Will see surgeon in Dec also.  No other issues to report.  Denies any other needs.  No other questions or concerns at this time.  Will continue to follow, agreeable to calls.    Care Transition Nurse reviewed discharge instructions, medical action plan, and red flags with patient. The patient was given an opportunity to

## 2024-11-20 ENCOUNTER — CARE COORDINATION (OUTPATIENT)
Dept: CASE MANAGEMENT | Age: 59
End: 2024-11-20

## 2024-11-20 NOTE — CARE COORDINATION
Care Transition Nurse provided contact information.  Plan for follow-up call in 2-5 days based on severity of symptoms and risk factors.  Plan for next call: symptom management-new or worsening symptoms, back surgery  self management-leg/foot pain?      Lona Galvan RN

## 2024-11-22 ENCOUNTER — CARE COORDINATION (OUTPATIENT)
Dept: CASE MANAGEMENT | Age: 59
End: 2024-11-22

## 2024-11-22 NOTE — CARE COORDINATION
Care Transitions Note    Follow Up Call -1st attempt    Attempted to reach patient for transitions of care follow up.  Unable to reach patient.      Outreach Attempts:   HIPAA compliant voicemail left for patient.       Follow Up Appointment:   Future Appointments         Provider Specialty Dept Phone    12/11/2024 9:45 AM Usman Hurd MD      12/30/2024 8:45 AM Raimundo Robledo, APRN - CNP Family Medicine [phone]    1/7/2025 3:45 PM Ivy Estevez MD Vascular Surgery [phone]            Plan for follow-up call in 2-5 days based on severity of symptoms and risk factors. Plan for next call: symptom management-new or worsening symptoms, back surgery  self management-leg/foot pain?    Lona Galvan RN

## 2024-11-25 ENCOUNTER — CARE COORDINATION (OUTPATIENT)
Dept: CASE MANAGEMENT | Age: 59
End: 2024-11-25

## 2024-11-25 NOTE — CARE COORDINATION
Care Transitions Note    Follow Up Call     Patient Current Location:  Home: [address] [place] [address]    Care Transition Nurse contacted the patient by telephone. Verified name and  as identifiers.    Additional needs identified to be addressed with provider   No needs identified                 Method of communication with provider: none.    Care Summary Note:  Spoke with Silvino, said he is doing well.  Has not had any other episodes of pain in leg or foot.  He had sent a message to PCP since didn't hear back from [facility].  PCP said he wouldn't be concerned if there was no color change of foot.  Continues to wear compression hose.  Eating, drinking, sleeping good.  No other issues to report.  Denies any other needs.  No other questions or concerns at this time.  Will continue to follow, agreeable to calls.  Very appreciative of calls.    Plan of care updates since last contact:  No changes       Advance Care Planning:   Does patient have an Advance Directive: reviewed during previous call, see note. .    Medication Review:  No changes since last call.     Remote Patient Monitoring:  Offered patient enrollment in the Remote Patient Monitoring (RPM) program for in-home monitoring: Patient is not eligible for RPM program because: patient does not have qualifying diagnosis.    Assessments:  No changes since last call    Follow Up Appointment:   Reviewed upcoming appointment(s).  Future Appointments         Provider Specialty Dept Phone    2024 9:45 AM Usman Hurd MD      2024 8:45 AM Raimundo Robledo, APRN - CNP Family Medicine [phone]    2025 3:45 PM Ivy Estevez MD Vascular Surgery [phone]            Care Transition Nurse provided contact information.  Plan for follow-up call in 6-10 days based on severity of symptoms and risk factors.  Plan for next call: symptom management-new or worsening symptoms, back surgery      Lona Galvan RN

## 2024-12-02 ENCOUNTER — CARE COORDINATION (OUTPATIENT)
Dept: CASE MANAGEMENT | Age: 59
End: 2024-12-02

## 2024-12-02 NOTE — CARE COORDINATION
Care Transitions Note    Follow Up Call     Patient Current Location:  Home: [address] [place] [address]    Care Transition Nurse contacted the patient by telephone. Verified name and  as identifiers.    Additional needs identified to be addressed with provider   No needs identified                 Method of communication with provider: none.    Care Summary Note:  Spoke with Silvino, said he is doing pretty good.  Denies any issues with his back.  Eating, drinking, sleeping good.  No issues with B&B.  He has a possible summons for jury duty but can't sit for long periods of time and needs provider to fill out paperwork indicating so.  He has LVM with [facility] but hasn't heard back yet.  He will ask PCP about it if no response from [facility].  Has appt there next week.  No other issues to report.  Denies any other needs.  No other questions or concerns at this time.  Will continue to follow, agreeable to calls.    Plan of care updates since last contact:  No changes       Advance Care Planning:   Does patient have an Advance Directive: reviewed during previous call, see note. .    Medication Review:  No changes since last call.     Remote Patient Monitoring:  Offered patient enrollment in the Remote Patient Monitoring (RPM) program for in-home monitoring: Patient is not eligible for RPM program because: patient does not have qualifying diagnosis.    Assessments:  No changes since last call    Follow Up Appointment:   Reviewed upcoming appointment(s).  Future Appointments         Provider Specialty Dept Phone    2024 9:45 AM Usman Hurd MD      2024 8:45 AM Raimundo Robledo, APRN - CNP Family Medicine [phone]    2025 3:45 PM Ivy Estevez MD Vascular Surgery [phone]            Care Transition Nurse provided contact information.  Plan for follow-up call in 6-10 days based on severity of symptoms and risk factors.  Plan for next call: symptom management-new or worsening

## 2024-12-12 ENCOUNTER — CARE COORDINATION (OUTPATIENT)
Dept: CASE MANAGEMENT | Age: 59
End: 2024-12-12

## 2024-12-12 NOTE — CARE COORDINATION
Care Transitions Note    Final Call     Patient Current Location:  Home: [address] [place] [address]    Care Transition Nurse contacted the patient by telephone. Verified name and  as identifiers.    Patient graduated from the Care Transitions program on 24.  Patient/family verbalizes confidence in the ability to self-manage at this time. has the ability to self manage at this time..      Advance Care Planning:   Does patient have an Advance Directive: reviewed during previous call, see note. .    Handoff:   Patient was not referred to the ACM team due to no additional needs identified.       Care Summary Note:  Spoke with Silvino, said he is feeling good.  Saw surgeon in [place] yesterday, no changes.  To continue no twisting, bending, lifting greater than 5 lbs.  Will see again 25.  He is allowed to start driving.  Pain is minimal, only taking Tylenol, rarely takes Percocet.  He believe he will get out of jury duty, papers sent.  No other issues to report.  Denies any other needs.  No other questions or concerns at this time.  Final call, very appreciative of calls.    Assessments:  No changes since last call    Upcoming Appointments:    Future Appointments         Provider Specialty Dept Phone    2024 8:45 AM Raimundo Robledo, APRN - CNP Family Medicine [phone]    2025 3:45 PM Ivy Estevez MD Vascular Surgery [phone]            Patient has agreed to contact primary care provider and/or specialist for any further questions, concerns, or needs.    Lona Galvan RN

## 2024-12-16 ENCOUNTER — PATIENT MESSAGE (OUTPATIENT)
Dept: FAMILY MEDICINE CLINIC | Age: 59
End: 2024-12-16

## 2024-12-16 DIAGNOSIS — J30.2 ACUTE SEASONAL ALLERGIC RHINITIS: ICD-10-CM

## 2024-12-16 RX ORDER — HYDROXYZINE HYDROCHLORIDE 50 MG/1
50 TABLET, FILM COATED ORAL 3 TIMES DAILY PRN
Qty: 270 TABLET | Refills: 1 | Status: SHIPPED | OUTPATIENT
Start: 2024-12-16

## 2024-12-23 ENCOUNTER — PATIENT MESSAGE (OUTPATIENT)
Dept: FAMILY MEDICINE CLINIC | Age: 59
End: 2024-12-23

## 2024-12-24 RX ORDER — ALLOPURINOL 300 MG/1
300 TABLET ORAL 2 TIMES DAILY
Qty: 180 TABLET | Refills: 3 | Status: SHIPPED | OUTPATIENT
Start: 2024-12-24

## 2025-01-07 ENCOUNTER — OFFICE VISIT (OUTPATIENT)
Age: 60
End: 2025-01-07
Payer: COMMERCIAL

## 2025-01-07 VITALS
WEIGHT: 234 LBS | HEIGHT: 76 IN | SYSTOLIC BLOOD PRESSURE: 121 MMHG | HEART RATE: 114 BPM | DIASTOLIC BLOOD PRESSURE: 70 MMHG | BODY MASS INDEX: 28.49 KG/M2

## 2025-01-07 DIAGNOSIS — I73.9 PAD (PERIPHERAL ARTERY DISEASE) (HCC): Primary | ICD-10-CM

## 2025-01-07 DIAGNOSIS — I87.2 CHRONIC VENOUS INSUFFICIENCY OF LOWER EXTREMITY: ICD-10-CM

## 2025-01-07 PROCEDURE — 3078F DIAST BP <80 MM HG: CPT | Performed by: SURGERY

## 2025-01-07 PROCEDURE — 3074F SYST BP LT 130 MM HG: CPT | Performed by: SURGERY

## 2025-01-07 PROCEDURE — 99203 OFFICE O/P NEW LOW 30 MIN: CPT | Performed by: SURGERY

## 2025-01-07 NOTE — PROGRESS NOTES
Division of Vascular Surgery        New Consult      Physician Requesting Consult:  Dr. Paco Gilman    Reason for Consult:   Peripheral arterial disease    Chief Complaint:      Back pain, foot drop    History of Present Illness:      Quoc Dominguez Jr. is a 59 y.o. gentleman who presents with known peripheral arterial disease with right femoral artery occlusion.  HE does not have significant claudication symptoms.  In 2019 he underwent attempt at angioplasty by Interventional Radiologist Dr. Comer, which was unsuccessful.  He did not have significant claudication symptoms that were limiting him and he did not have any wounds at the time.  HE developed foot drop last year and workup lead to finding of pinched nerve and he recently underwent spine surgery.  His foot drop is improving, he does not wear a brace and we discussed that this may help support his foot as he recovers.  He continues to denies claudication in his legs or symptoms of ischemic rest pain.  He does not have any open wounds or sores on his feet.  He has chronic venous stasis skin changes and varicose veins on his legs, denies prior thrombotic events.  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  He wears compression daily.      Medical History:     Past Medical History:   Diagnosis Date    Anxiety     Arthritis     Hyperlipemia     Hypertension     Ruptured varicose vein        Surgical History:     Past Surgical History:   Procedure Laterality Date    BACK SURGERY  11/18/2016    L-5 and bulging disc repair 2 rods and 4 screws with fusion at [facility] with Dr. Usman Hurd    BACK SURGERY  2002    COLONOSCOPY  10/13/2017    Dr Hassan    COLONOSCOPY  2014    Dr Hassan    COLONOSCOPY  2013    COLONOSCOPY Left 10/21/2018    COLONOSCOPY CONTROL HEMORRHAGE performed by Quoc Heard MD at [facility]    HEMORRHOID SURGERY  09/28/2018    banding-Dr Hassan    JOINT REPLACEMENT Right 07/18/2019    knee

## 2025-01-08 PROBLEM — I87.2 CHRONIC VENOUS INSUFFICIENCY OF LOWER EXTREMITY: Status: ACTIVE | Noted: 2025-01-08

## 2025-01-08 PROBLEM — I73.9 PAD (PERIPHERAL ARTERY DISEASE) (HCC): Status: ACTIVE | Noted: 2025-01-08

## 2025-01-08 ASSESSMENT — ENCOUNTER SYMPTOMS
BACK PAIN: 1
CHEST TIGHTNESS: 0
ABDOMINAL PAIN: 0
ALLERGIC/IMMUNOLOGIC NEGATIVE: 1
COLOR CHANGE: 0
SHORTNESS OF BREATH: 0

## 2025-01-14 SDOH — ECONOMIC STABILITY: INCOME INSECURITY: IN THE LAST 12 MONTHS, WAS THERE A TIME WHEN YOU WERE NOT ABLE TO PAY THE MORTGAGE OR RENT ON TIME?: NO

## 2025-01-14 SDOH — ECONOMIC STABILITY: FOOD INSECURITY: WITHIN THE PAST 12 MONTHS, YOU WORRIED THAT YOUR FOOD WOULD RUN OUT BEFORE YOU GOT MONEY TO BUY MORE.: NEVER TRUE

## 2025-01-14 SDOH — ECONOMIC STABILITY: FOOD INSECURITY: WITHIN THE PAST 12 MONTHS, THE FOOD YOU BOUGHT JUST DIDN'T LAST AND YOU DIDN'T HAVE MONEY TO GET MORE.: NEVER TRUE

## 2025-01-15 ENCOUNTER — OFFICE VISIT (OUTPATIENT)
Dept: FAMILY MEDICINE CLINIC | Age: 60
End: 2025-01-15

## 2025-01-15 VITALS
BODY MASS INDEX: 28.7 KG/M2 | SYSTOLIC BLOOD PRESSURE: 110 MMHG | DIASTOLIC BLOOD PRESSURE: 66 MMHG | HEART RATE: 120 BPM | RESPIRATION RATE: 16 BRPM | WEIGHT: 235.8 LBS

## 2025-01-15 DIAGNOSIS — M15.0 PRIMARY OSTEOARTHRITIS INVOLVING MULTIPLE JOINTS: Primary | ICD-10-CM

## 2025-01-15 DIAGNOSIS — M51.360 DEGENERATION OF INTERVERTEBRAL DISC OF LUMBAR REGION WITH DISCOGENIC BACK PAIN: ICD-10-CM

## 2025-01-15 DIAGNOSIS — Z12.5 SCREENING PSA (PROSTATE SPECIFIC ANTIGEN): ICD-10-CM

## 2025-01-15 DIAGNOSIS — I73.9 PAD (PERIPHERAL ARTERY DISEASE) (HCC): ICD-10-CM

## 2025-01-15 DIAGNOSIS — Z98.1 S/P LUMBAR FUSION: ICD-10-CM

## 2025-01-15 DIAGNOSIS — E78.00 HYPERCHOLESTEREMIA: ICD-10-CM

## 2025-01-15 DIAGNOSIS — R73.01 IFG (IMPAIRED FASTING GLUCOSE): ICD-10-CM

## 2025-01-15 DIAGNOSIS — I10 PRIMARY HYPERTENSION: ICD-10-CM

## 2025-01-15 DIAGNOSIS — I25.10 NONOBSTRUCTIVE ATHEROSCLEROSIS OF CORONARY ARTERY: ICD-10-CM

## 2025-01-15 DIAGNOSIS — E79.0 ELEVATED URIC ACID IN BLOOD: ICD-10-CM

## 2025-01-15 RX ORDER — MELOXICAM 15 MG/1
15 TABLET ORAL DAILY
Qty: 90 TABLET | Refills: 3 | Status: SHIPPED | OUTPATIENT
Start: 2025-01-15

## 2025-01-15 RX ORDER — ALLOPURINOL 300 MG/1
300 TABLET ORAL 2 TIMES DAILY
Qty: 180 TABLET | Refills: 3 | Status: SHIPPED | OUTPATIENT
Start: 2025-01-15

## 2025-01-15 RX ORDER — HYDROCODONE BITARTRATE AND ACETAMINOPHEN 10; 325 MG/1; MG/1
1 TABLET ORAL EVERY 12 HOURS PRN
Qty: 60 TABLET | Refills: 0 | Status: SHIPPED | OUTPATIENT
Start: 2025-01-15 | End: 2025-02-14

## 2025-01-15 ASSESSMENT — PATIENT HEALTH QUESTIONNAIRE - PHQ9
SUM OF ALL RESPONSES TO PHQ QUESTIONS 1-9: 0
SUM OF ALL RESPONSES TO PHQ9 QUESTIONS 1 & 2: 0
SUM OF ALL RESPONSES TO PHQ QUESTIONS 1-9: 0
SUM OF ALL RESPONSES TO PHQ QUESTIONS 1-9: 0
1. LITTLE INTEREST OR PLEASURE IN DOING THINGS: NOT AT ALL
2. FEELING DOWN, DEPRESSED OR HOPELESS: NOT AT ALL
SUM OF ALL RESPONSES TO PHQ QUESTIONS 1-9: 0

## 2025-01-15 ASSESSMENT — ENCOUNTER SYMPTOMS
NAUSEA: 0
COUGH: 0
SHORTNESS OF BREATH: 0
BACK PAIN: 1
ABDOMINAL PAIN: 0

## 2025-01-15 NOTE — PROGRESS NOTES
Subjective:      Patient ID: Quoc Dominguez Jr. 1965 is a 59 y.o. male here for PRE-OP    Chief Complaint   Patient presents with    6 Month Follow-Up    Medication Refill       S/p 2 month back surgery with Dr. Hurd at [facility].  Excellent benefit seen from surgery.  Hx of back surgery in 2020 and 2016 with hardware.   He was on Norco  QID.  Per pateint pain medication more for arthritis over back.  Chronic joint pains.   Hx of right partial knee replacement.   Keeps him active and moving.    Left knee starting to click.  Mild pain.   Patient Active Problem List   Diagnosis    HTN (hypertension)    Hypercholesteremia    Insomnia    Spine pain,  L-S- chronic pain    Varicose veins of both lower extremities    GERD (gastroesophageal reflux disease)    ED (erectile dysfunction)    Allergic rhinitis    Itchy skin    Medication monitoring encounter    Chronic cervical pain with radicular shoulder pain.    Chronic abdominal pain, reflux/heartburn.    Bilateral knee pain, rt>lt.    Lumbar disc disease with radiculopathy, L5-S1 with sciatica    Chronic anxiety    Hx of lumbar discectomy, L5-S1, with one level fusion, 11/18/16.    Primary osteoarthritis of right knee, bone on bone    Pain of right hip joint    Ventral hernia without obstruction or gangrene    Rectal bleeding    Acute GI bleeding    Hypovolemic shock (HCC)    Hemorrhagic shock (HCC)    Opioid dependence with opioid-induced sexual dysfunction (HCC)    Acquired buried penis    History of operative procedure on lumbosacral spinal structure    Intermittent claudication (HCC)    PVD (peripheral vascular disease) (HCC)    Nonobstructive atherosclerosis of coronary artery    Family history of cardiac disorder    Calcification of coronary artery    PAD (peripheral artery disease) (HCC)    Chronic venous insufficiency of lower extremity       No Known Allergies     COLONOSCOPY - 2018     [facility]STRO - Dr. Murillo  [facility] - Dr. Gilman  CARDIO - Dr. Armendariz     CT

## 2025-02-11 LAB
A/G RATIO: 1.8 (ref 1.5–2.5)
ALBUMIN: 4.7 G/DL (ref 3.5–5)
ALP BLD-CCNC: 34 IU/L (ref 41–137)
ALT SERPL-CCNC: 58 IU/L (ref 10–40)
ANION GAP SERPL CALCULATED.3IONS-SCNC: 11 MMOL/L (ref 4–12)
AST SERPL-CCNC: 46 IU/L (ref 15–41)
BASOPHILS ABSOLUTE: 100 /CMM (ref 0–200)
BASOPHILS RELATIVE PERCENT: 0.8 % (ref 0–2)
BILIRUB SERPL-MCNC: 0.8 MG/DL (ref 0.2–1)
BUN BLDV-MCNC: 19 MG/DL (ref 7–20)
CALCIUM SERPL-MCNC: 9.5 MG/DL (ref 8.8–10.5)
CHLORIDE BLD-SCNC: 96 MEQ/L (ref 101–111)
CHOLESTEROL, TOTAL: 125 MG/DL
CHOLESTEROL/HDL RELATIVE RISK: 5.6 (ref 4–5)
CO2: 27 MEQ/L (ref 21–32)
CREAT SERPL-MCNC: 0.78 MG/DL (ref 0.6–1.3)
CREATININE CLEARANCE: >60
DIRECT-LDL / HDL RISK: 1.6
EOSINOPHILS ABSOLUTE: 200 /CMM (ref 0–500)
EOSINOPHILS RELATIVE PERCENT: 3.8 % (ref 0–6)
ESTIMATED AVERAGE GLUCOSE: 160 MG/DL
GLUCOSE: 133 MG/DL (ref 70–110)
HBA1C MFR BLD: 7.2 % (ref 4.4–6.4)
HCT VFR BLD CALC: 39 % (ref 40–49)
HDLC SERPL-MCNC: 22 MG/DL
HEMOGLOBIN: 13.6 GM/DL (ref 13.5–16.5)
LDL CHOLESTEROL DIRECT: 36 MG/DL
LYMPHOCYTES ABSOLUTE: 2400 /CMM (ref 1000–4800)
LYMPHOCYTES RELATIVE PERCENT: 39.8 % (ref 15–45)
MCH RBC QN AUTO: 31.8 PG (ref 27.5–33)
MCHC RBC AUTO-ENTMCNC: 34.8 GM/DL (ref 33–36)
MCV RBC AUTO: 91.2 CU MIC (ref 80–97)
MONOCYTES ABSOLUTE: 500 /CMM (ref 0–800)
MONOCYTES RELATIVE PERCENT: 7.9 % (ref 2–10)
NEUTROPHILS ABSOLUTE: 2900 /CMM (ref 1800–7700)
NEUTROPHILS RELATIVE PERCENT: 47.7 % (ref 40–70)
NUCLEATED RBCS: 0.2 /100 WBC
PDW BLD-RTO: 13.7 % (ref 12–16)
PLATELET # BLD: 189 TH/CMM (ref 150–400)
POTASSIUM SERPL-SCNC: 4.3 MEQ/L (ref 3.6–5)
RBC # BLD: 4.28 MIL/CMM (ref 4.5–6)
SODIUM BLD-SCNC: 134 MEQ/L (ref 135–145)
TOTAL PROTEIN: 7.3 G/DL (ref 6.2–8)
TRIGL SERPL-MCNC: 794 MG/DL
TSH REFLEX: 2.27 MCIU/ML (ref 0.49–4.67)
URIC ACID: 7.4 MG/DL (ref 4.8–8.7)
VLDLC SERPL CALC-MCNC: ABNORMAL MG/DL
WBC # BLD: 6 TH/CMM (ref 4.4–10.5)

## 2025-02-26 RX ORDER — CILOSTAZOL 100 MG/1
100 TABLET ORAL 2 TIMES DAILY
Qty: 180 TABLET | Refills: 1 | Status: SHIPPED | OUTPATIENT
Start: 2025-02-26

## 2025-03-07 ENCOUNTER — PATIENT MESSAGE (OUTPATIENT)
Dept: FAMILY MEDICINE CLINIC | Age: 60
End: 2025-03-07

## 2025-03-07 DIAGNOSIS — M15.0 PRIMARY OSTEOARTHRITIS INVOLVING MULTIPLE JOINTS: ICD-10-CM

## 2025-03-10 RX ORDER — HYDROCODONE BITARTRATE AND ACETAMINOPHEN 10; 325 MG/1; MG/1
1 TABLET ORAL EVERY 12 HOURS PRN
Qty: 30 TABLET | Refills: 0 | Status: SHIPPED | OUTPATIENT
Start: 2025-03-10 | End: 2025-04-09

## 2025-03-10 NOTE — TELEPHONE ENCOUNTER
Yes this prescription should last for 30 days taking it PRN every 12 hours.   Working on weaning patient down off.

## 2025-03-10 NOTE — TELEPHONE ENCOUNTER
Received a call from Andres daigle. And she wanted to double check patient norco.   Is patient to receive 30 pills for 30 days?  Directions states for up to 30 days.

## 2025-03-12 ENCOUNTER — OFFICE VISIT (OUTPATIENT)
Dept: FAMILY MEDICINE CLINIC | Age: 60
End: 2025-03-12
Payer: COMMERCIAL

## 2025-03-12 VITALS
WEIGHT: 229 LBS | HEART RATE: 72 BPM | BODY MASS INDEX: 27.87 KG/M2 | RESPIRATION RATE: 16 BRPM | DIASTOLIC BLOOD PRESSURE: 62 MMHG | SYSTOLIC BLOOD PRESSURE: 126 MMHG

## 2025-03-12 DIAGNOSIS — I10 PRIMARY HYPERTENSION: ICD-10-CM

## 2025-03-12 DIAGNOSIS — L03.031 PARONYCHIA OF GREAT TOE OF RIGHT FOOT: Primary | ICD-10-CM

## 2025-03-12 PROCEDURE — 3074F SYST BP LT 130 MM HG: CPT | Performed by: NURSE PRACTITIONER

## 2025-03-12 PROCEDURE — G2211 COMPLEX E/M VISIT ADD ON: HCPCS | Performed by: NURSE PRACTITIONER

## 2025-03-12 PROCEDURE — 3078F DIAST BP <80 MM HG: CPT | Performed by: NURSE PRACTITIONER

## 2025-03-12 PROCEDURE — 99213 OFFICE O/P EST LOW 20 MIN: CPT | Performed by: NURSE PRACTITIONER

## 2025-03-12 RX ORDER — CHLORTHALIDONE 25 MG/1
25 TABLET ORAL DAILY
Qty: 90 TABLET | Refills: 3 | Status: SHIPPED | OUTPATIENT
Start: 2025-03-12

## 2025-03-12 RX ORDER — SULFAMETHOXAZOLE AND TRIMETHOPRIM 800; 160 MG/1; MG/1
1 TABLET ORAL 2 TIMES DAILY
Qty: 14 TABLET | Refills: 0 | Status: SHIPPED | OUTPATIENT
Start: 2025-03-12 | End: 2025-03-19

## 2025-03-12 RX ORDER — CHLORTHALIDONE 25 MG/1
25 TABLET ORAL DAILY
COMMUNITY
End: 2025-03-12

## 2025-03-12 ASSESSMENT — ENCOUNTER SYMPTOMS
SHORTNESS OF BREATH: 0
ABDOMINAL PAIN: 0
COUGH: 0
NAUSEA: 0

## 2025-03-12 NOTE — PROGRESS NOTES
Quoc FERNANDO Dominguez Jr. (1965) 59 y.o. male here for evaluation of the following chief complaint(s):      HPI:  Chief Complaint   Patient presents with    Toe Pain     Right great toe x 1 week.  Soaking in Epson salt       Onset of 1 week with right great toe.  Soaking it Epsom Salt Daily.  Discharge.  Redness.  Swelling    Vitals:    03/12/25 1131   BP: 126/62   Pulse: 72   Resp: 16       Patient Active Problem List   Diagnosis    HTN (hypertension)    Hypercholesteremia    Insomnia    Spine pain,  L-S- chronic pain    Varicose veins of both lower extremities    GERD (gastroesophageal reflux disease)    ED (erectile dysfunction)    Allergic rhinitis    Itchy skin    Medication monitoring encounter    Chronic cervical pain with radicular shoulder pain.    Chronic abdominal pain, reflux/heartburn.    Bilateral knee pain, rt>lt.    Lumbar disc disease with radiculopathy, L5-S1 with sciatica    Chronic anxiety    Hx of lumbar discectomy, L5-S1, with one level fusion, 11/18/16.    Primary osteoarthritis of right knee, bone on bone    Pain of right hip joint    Ventral hernia without obstruction or gangrene    Rectal bleeding    Acute GI bleeding    Hypovolemic shock (HCC)    Hemorrhagic shock (HCC)    Opioid dependence with opioid-induced sexual dysfunction (HCC)    Acquired buried penis    History of operative procedure on lumbosacral spinal structure    Intermittent claudication    PVD (peripheral vascular disease)    Nonobstructive atherosclerosis of coronary artery    Family history of cardiac disorder    Calcification of coronary artery    PAD (peripheral artery disease)    Chronic venous insufficiency of lower extremity       SUBJECTIVE/OBJECTIVE:  Review of Systems   Constitutional:  Negative for chills and fever.   HENT: Negative.     Respiratory:  Negative for cough and shortness of breath.    Cardiovascular:  Negative for chest pain.   Gastrointestinal:  Negative for abdominal pain and nausea.   Skin:

## 2025-03-24 ENCOUNTER — PATIENT MESSAGE (OUTPATIENT)
Dept: FAMILY MEDICINE CLINIC | Age: 60
End: 2025-03-24

## 2025-03-24 DIAGNOSIS — L03.031 PARONYCHIA OF GREAT TOE OF RIGHT FOOT: ICD-10-CM

## 2025-03-24 RX ORDER — SULFAMETHOXAZOLE AND TRIMETHOPRIM 800; 160 MG/1; MG/1
1 TABLET ORAL 2 TIMES DAILY
Qty: 14 TABLET | Refills: 0 | Status: SHIPPED | OUTPATIENT
Start: 2025-03-24 | End: 2025-03-31

## 2025-04-01 ENCOUNTER — PATIENT MESSAGE (OUTPATIENT)
Dept: FAMILY MEDICINE CLINIC | Age: 60
End: 2025-04-01

## 2025-04-01 DIAGNOSIS — K64.4 EXTERNAL HEMORRHOID: ICD-10-CM

## 2025-04-01 RX ORDER — HYDROCORTISONE 25 MG/G
CREAM TOPICAL 3 TIMES DAILY PRN
Qty: 450 G | Refills: 0 | Status: SHIPPED | OUTPATIENT
Start: 2025-04-01

## 2025-05-05 ENCOUNTER — PATIENT MESSAGE (OUTPATIENT)
Dept: FAMILY MEDICINE CLINIC | Age: 60
End: 2025-05-05

## 2025-05-05 DIAGNOSIS — M15.0 PRIMARY OSTEOARTHRITIS INVOLVING MULTIPLE JOINTS: ICD-10-CM

## 2025-05-06 RX ORDER — HYDROCODONE BITARTRATE AND ACETAMINOPHEN 10; 325 MG/1; MG/1
1 TABLET ORAL EVERY 12 HOURS PRN
Qty: 30 TABLET | Refills: 0 | Status: SHIPPED | OUTPATIENT
Start: 2025-05-06 | End: 2025-06-05

## 2025-06-16 ENCOUNTER — PATIENT MESSAGE (OUTPATIENT)
Dept: FAMILY MEDICINE CLINIC | Age: 60
End: 2025-06-16

## 2025-06-16 DIAGNOSIS — L03.031 PARONYCHIA OF GREAT TOE OF RIGHT FOOT: ICD-10-CM

## 2025-06-16 DIAGNOSIS — E78.00 HYPERCHOLESTEREMIA: ICD-10-CM

## 2025-06-16 DIAGNOSIS — R73.01 IFG (IMPAIRED FASTING GLUCOSE): ICD-10-CM

## 2025-06-16 RX ORDER — SULFAMETHOXAZOLE AND TRIMETHOPRIM 800; 160 MG/1; MG/1
1 TABLET ORAL 2 TIMES DAILY
Qty: 14 TABLET | Refills: 0 | Status: SHIPPED | OUTPATIENT
Start: 2025-06-16 | End: 2025-06-23

## 2025-06-16 RX ORDER — SULFAMETHOXAZOLE AND TRIMETHOPRIM 800; 160 MG/1; MG/1
1 TABLET ORAL 2 TIMES DAILY
Qty: 14 TABLET | Refills: 0 | Status: SHIPPED | OUTPATIENT
Start: 2025-06-16 | End: 2025-06-16 | Stop reason: SDUPTHER

## 2025-06-16 RX ORDER — FENOFIBRATE 160 MG/1
160 TABLET ORAL DAILY
Qty: 90 TABLET | Refills: 3 | Status: SHIPPED | OUTPATIENT
Start: 2025-06-16

## 2025-07-08 ENCOUNTER — OFFICE VISIT (OUTPATIENT)
Dept: FAMILY MEDICINE CLINIC | Age: 60
End: 2025-07-08

## 2025-07-08 VITALS
DIASTOLIC BLOOD PRESSURE: 68 MMHG | RESPIRATION RATE: 16 BRPM | WEIGHT: 222.4 LBS | BODY MASS INDEX: 27.07 KG/M2 | SYSTOLIC BLOOD PRESSURE: 120 MMHG | HEART RATE: 76 BPM

## 2025-07-08 DIAGNOSIS — E78.00 HYPERCHOLESTEREMIA: ICD-10-CM

## 2025-07-08 DIAGNOSIS — M15.0 PRIMARY OSTEOARTHRITIS INVOLVING MULTIPLE JOINTS: ICD-10-CM

## 2025-07-08 DIAGNOSIS — Z98.1 S/P LUMBAR FUSION: ICD-10-CM

## 2025-07-08 DIAGNOSIS — R73.01 IFG (IMPAIRED FASTING GLUCOSE): ICD-10-CM

## 2025-07-08 DIAGNOSIS — R00.0 TACHYCARDIA: Primary | ICD-10-CM

## 2025-07-08 DIAGNOSIS — I10 PRIMARY HYPERTENSION: ICD-10-CM

## 2025-07-08 DIAGNOSIS — I73.9 PAD (PERIPHERAL ARTERY DISEASE): ICD-10-CM

## 2025-07-08 DIAGNOSIS — M51.360 DEGENERATION OF INTERVERTEBRAL DISC OF LUMBAR REGION WITH DISCOGENIC BACK PAIN: ICD-10-CM

## 2025-07-08 PROBLEM — F11.281: Status: RESOLVED | Noted: 2019-02-04 | Resolved: 2025-07-08

## 2025-07-08 RX ORDER — METOPROLOL SUCCINATE 200 MG/1
200 TABLET, EXTENDED RELEASE ORAL 2 TIMES DAILY
COMMUNITY
Start: 2025-07-08

## 2025-07-08 ASSESSMENT — ENCOUNTER SYMPTOMS
BACK PAIN: 1
SHORTNESS OF BREATH: 0
NAUSEA: 0
COUGH: 0
ABDOMINAL PAIN: 0

## 2025-07-08 NOTE — PROGRESS NOTES
MG tablet TAKE 1 TABLET TWICE A  tablet 1    allopurinol (ZYLOPRIM) 300 MG tablet Take 1 tablet by mouth 2 times daily 180 tablet 3    meloxicam (MOBIC) 15 MG tablet Take 1 tablet by mouth daily 90 tablet 3    hydrOXYzine HCl (ATARAX) 50 MG tablet Take 1 tablet by mouth 3 times daily as needed for Itching 270 tablet 1    atorvastatin (LIPITOR) 40 MG tablet TAKE 1 TABLET NIGHTLY 90 tablet 3    tadalafil (CIALIS) 20 MG tablet Take 1 tablet by mouth as needed for Erectile Dysfunction 30 tablet 0    lisinopril (PRINIVIL;ZESTRIL) 40 MG tablet TAKE 1 TABLET DAILY 90 tablet 3    indomethacin (INDOCIN) 50 MG capsule Take 1 capsule by mouth 3 times daily as needed for Pain Take with food. 90 capsule 1    nystatin-triamcinolone (MYCOLOG II) 390197-4.1 UNIT/GM-% cream Apply topically 2 times daily. 60 g 2    ASPIRIN LOW DOSE 81 MG EC tablet Take 1 tablet by mouth daily      nystatin (MYCOSTATIN) 995757 UNIT/GM cream Apply topically 2 times daily Apply topically 2 times daily.      Misc Natural Products (TART CHERRY ADVANCED PO) Take 3,000 mg by mouth      omeprazole (PRILOSEC) 20 MG delayed release capsule 1 capsule Daily      Dermatological Products, Misc. (EPICERAM) EMUL       CALCIUM CARBONATE-VITAMIN D PO Take 2 tablets by mouth daily Calcium carbonate 600 mg tablet (total from taking 2 tablets is 1200 mg), unsure of vitamin D dose      B Complex Vitamins (VITAMIN B COMPLEX PO) Take 1 tablet by mouth daily One daily       Multiple Vitamins-Minerals (MULTIVITAMIN & MINERAL PO) Take 1 tablet by mouth daily One daily--one bottle refill for one year        No current facility-administered medications for this visit.

## 2025-07-14 ENCOUNTER — TELEPHONE (OUTPATIENT)
Dept: FAMILY MEDICINE CLINIC | Age: 60
End: 2025-07-14

## 2025-07-14 DIAGNOSIS — L02.91 ABSCESS: Primary | ICD-10-CM

## 2025-07-14 RX ORDER — SULFAMETHOXAZOLE AND TRIMETHOPRIM 800; 160 MG/1; MG/1
1 TABLET ORAL 2 TIMES DAILY
Qty: 10 TABLET | Refills: 0 | Status: SHIPPED | OUTPATIENT
Start: 2025-07-14 | End: 2025-07-19

## 2025-07-14 NOTE — TELEPHONE ENCOUNTER
Called patient and left a message that medication was sent to [facility] pharmacy.

## 2025-07-14 NOTE — TELEPHONE ENCOUNTER
Patient called and stated that he was seen not to long ago and he had a lump on his left ear lob but this past week it has gotten bigger and bigger and more sore. He stated that last night it seemed to pop and drain a lot of pus drainage. Patient stated that he had his wife clear it up and it feels a lot better and it is pretty much flat again.     He just would like an antibiotic to hopefully help since there was so much pus drainage.     He is using [facility] pharmacy. He will check with pharmacy after 3:00 today

## 2025-07-22 ENCOUNTER — TELEPHONE (OUTPATIENT)
Dept: FAMILY MEDICINE CLINIC | Age: 60
End: 2025-07-22

## 2025-07-22 DIAGNOSIS — M15.0 PRIMARY OSTEOARTHRITIS INVOLVING MULTIPLE JOINTS: ICD-10-CM

## 2025-07-22 DIAGNOSIS — I73.9 PAD (PERIPHERAL ARTERY DISEASE): Primary | ICD-10-CM

## 2025-07-22 RX ORDER — HYDROCODONE BITARTRATE AND ACETAMINOPHEN 5; 325 MG/1; MG/1
1 TABLET ORAL EVERY 8 HOURS PRN
Qty: 30 TABLET | Refills: 0 | Status: SHIPPED | OUTPATIENT
Start: 2025-07-22 | End: 2025-08-01

## 2025-07-22 NOTE — TELEPHONE ENCOUNTER
Patient requesting a script for his [place] sent to [facility].   Patient is also requesting a referral to vascular surgeon Dr To at [facility]. Please advise.

## (undated) DEVICE — SOLUTION IV IRRIG WATER 1000ML POUR BRL 2F7114

## (undated) DEVICE — Device: Brand: DEFENDO VALVE AND CONNECTOR KIT

## (undated) DEVICE — BIOVAC DIRECT SUCTION DEVICE 00711512

## (undated) DEVICE — CONMED SCOPE SAVER BITE BLOCK, 20X27 MM: Brand: SCOPE SAVER

## (undated) DEVICE — LINER SUCT CANSTR 1500CC SEMI RIG W/ POR HYDROPHOBIC SHUT

## (undated) DEVICE — SOLUTION IV 1000ML 0.9% SOD CHL PH 5 INJ USP VIAFLX PLAS

## (undated) DEVICE — GAUZE,SPONGE,4"X4",12PLY,WOVEN,NS,LF: Brand: MEDLINE